# Patient Record
Sex: FEMALE | Race: WHITE | Employment: OTHER | URBAN - METROPOLITAN AREA
[De-identification: names, ages, dates, MRNs, and addresses within clinical notes are randomized per-mention and may not be internally consistent; named-entity substitution may affect disease eponyms.]

---

## 2018-12-13 ENCOUNTER — APPOINTMENT (OUTPATIENT)
Dept: CT IMAGING | Age: 83
End: 2018-12-13
Payer: MEDICARE

## 2018-12-13 ENCOUNTER — HOSPITAL ENCOUNTER (EMERGENCY)
Age: 83
Discharge: HOME | End: 2018-12-13
Attending: EMERGENCY MEDICINE
Payer: MEDICARE

## 2018-12-13 VITALS
DIASTOLIC BLOOD PRESSURE: 78 MMHG | BODY MASS INDEX: 22.16 KG/M2 | HEIGHT: 65 IN | HEART RATE: 77 BPM | SYSTOLIC BLOOD PRESSURE: 144 MMHG | WEIGHT: 133 LBS | TEMPERATURE: 98.1 F | OXYGEN SATURATION: 95 % | RESPIRATION RATE: 16 BRPM

## 2018-12-13 DIAGNOSIS — R10.30 LOWER ABDOMINAL PAIN: Primary | ICD-10-CM

## 2018-12-13 DIAGNOSIS — N20.0 KIDNEY STONE: ICD-10-CM

## 2018-12-13 LAB
ALBUMIN SERPL-MCNC: 4.5 G/DL (ref 3.5–5.2)
ALP BLD-CCNC: 78 U/L (ref 35–104)
ALT SERPL-CCNC: 7 U/L (ref 0–32)
ANION GAP SERPL CALCULATED.3IONS-SCNC: 13 MMOL/L (ref 7–16)
AST SERPL-CCNC: 14 U/L (ref 0–31)
BACTERIA: NORMAL /HPF
BASOPHILS ABSOLUTE: 0.03 E9/L (ref 0–0.2)
BASOPHILS RELATIVE PERCENT: 0.3 % (ref 0–2)
BILIRUB SERPL-MCNC: 1 MG/DL (ref 0–1.2)
BILIRUBIN URINE: NEGATIVE
BLOOD, URINE: ABNORMAL
BUN BLDV-MCNC: 14 MG/DL (ref 8–23)
CALCIUM SERPL-MCNC: 9.6 MG/DL (ref 8.6–10.2)
CHLORIDE BLD-SCNC: 99 MMOL/L (ref 98–107)
CLARITY: CLEAR
CO2: 27 MMOL/L (ref 22–29)
COLOR: YELLOW
CREAT SERPL-MCNC: 0.6 MG/DL (ref 0.5–1)
EOSINOPHILS ABSOLUTE: 0.04 E9/L (ref 0.05–0.5)
EOSINOPHILS RELATIVE PERCENT: 0.4 % (ref 0–6)
GFR AFRICAN AMERICAN: >60
GFR NON-AFRICAN AMERICAN: >60 ML/MIN/1.73
GLUCOSE BLD-MCNC: 153 MG/DL (ref 74–99)
GLUCOSE URINE: NEGATIVE MG/DL
HCT VFR BLD CALC: 35.4 % (ref 34–48)
HEMOGLOBIN: 12 G/DL (ref 11.5–15.5)
IMMATURE GRANULOCYTES #: 0.02 E9/L
IMMATURE GRANULOCYTES %: 0.2 % (ref 0–5)
KETONES, URINE: NEGATIVE MG/DL
LACTIC ACID: 1.4 MMOL/L (ref 0.5–2.2)
LEUKOCYTE ESTERASE, URINE: NEGATIVE
LIPASE: 21 U/L (ref 13–60)
LYMPHOCYTES ABSOLUTE: 1.05 E9/L (ref 1.5–4)
LYMPHOCYTES RELATIVE PERCENT: 10.3 % (ref 20–42)
MCH RBC QN AUTO: 31.7 PG (ref 26–35)
MCHC RBC AUTO-ENTMCNC: 33.9 % (ref 32–34.5)
MCV RBC AUTO: 93.7 FL (ref 80–99.9)
MONOCYTES ABSOLUTE: 0.49 E9/L (ref 0.1–0.95)
MONOCYTES RELATIVE PERCENT: 4.8 % (ref 2–12)
NEUTROPHILS ABSOLUTE: 8.55 E9/L (ref 1.8–7.3)
NEUTROPHILS RELATIVE PERCENT: 84 % (ref 43–80)
NITRITE, URINE: NEGATIVE
PDW BLD-RTO: 12.7 FL (ref 11.5–15)
PH UA: 6 (ref 5–9)
PLATELET # BLD: 219 E9/L (ref 130–450)
PMV BLD AUTO: 9 FL (ref 7–12)
POTASSIUM SERPL-SCNC: 4.2 MMOL/L (ref 3.5–5)
PROTEIN UA: NEGATIVE MG/DL
RBC # BLD: 3.78 E12/L (ref 3.5–5.5)
RBC UA: NORMAL /HPF (ref 0–2)
SODIUM BLD-SCNC: 139 MMOL/L (ref 132–146)
SPECIFIC GRAVITY UA: <=1.005 (ref 1–1.03)
TOTAL PROTEIN: 6.9 G/DL (ref 6.4–8.3)
TROPONIN: <0.01 NG/ML (ref 0–0.03)
UROBILINOGEN, URINE: 0.2 E.U./DL
WBC # BLD: 10.2 E9/L (ref 4.5–11.5)
WBC UA: NORMAL /HPF (ref 0–5)

## 2018-12-13 PROCEDURE — 99284 EMERGENCY DEPT VISIT MOD MDM: CPT

## 2018-12-13 PROCEDURE — 81001 URINALYSIS AUTO W/SCOPE: CPT

## 2018-12-13 PROCEDURE — 80053 COMPREHEN METABOLIC PANEL: CPT

## 2018-12-13 PROCEDURE — 2580000003 HC RX 258: Performed by: EMERGENCY MEDICINE

## 2018-12-13 PROCEDURE — 83605 ASSAY OF LACTIC ACID: CPT

## 2018-12-13 PROCEDURE — 85025 COMPLETE CBC W/AUTO DIFF WBC: CPT

## 2018-12-13 PROCEDURE — 83690 ASSAY OF LIPASE: CPT

## 2018-12-13 PROCEDURE — 74176 CT ABD & PELVIS W/O CONTRAST: CPT

## 2018-12-13 PROCEDURE — 84484 ASSAY OF TROPONIN QUANT: CPT

## 2018-12-13 RX ORDER — HYDROCODONE BITARTRATE AND ACETAMINOPHEN 5; 325 MG/1; MG/1
0.5 TABLET ORAL EVERY 6 HOURS PRN
Qty: 6 TABLET | Refills: 0 | Status: SHIPPED | OUTPATIENT
Start: 2018-12-13 | End: 2018-12-17

## 2018-12-13 RX ORDER — CEPHALEXIN 500 MG/1
500 CAPSULE ORAL 2 TIMES DAILY
Qty: 14 CAPSULE | Refills: 0 | Status: SHIPPED | OUTPATIENT
Start: 2018-12-13 | End: 2018-12-20

## 2018-12-13 RX ORDER — 0.9 % SODIUM CHLORIDE 0.9 %
500 INTRAVENOUS SOLUTION INTRAVENOUS ONCE
Status: COMPLETED | OUTPATIENT
Start: 2018-12-13 | End: 2018-12-13

## 2018-12-13 RX ORDER — ONDANSETRON 4 MG/1
4 TABLET, FILM COATED ORAL EVERY 8 HOURS PRN
Qty: 12 TABLET | Refills: 0 | Status: SHIPPED | OUTPATIENT
Start: 2018-12-13 | End: 2018-12-18

## 2018-12-13 RX ADMIN — SODIUM CHLORIDE 500 ML: 9 INJECTION, SOLUTION INTRAVENOUS at 18:15

## 2018-12-13 ASSESSMENT — PAIN DESCRIPTION - DESCRIPTORS: DESCRIPTORS: STABBING

## 2018-12-13 ASSESSMENT — PAIN DESCRIPTION - FREQUENCY: FREQUENCY: INTERMITTENT

## 2018-12-13 NOTE — ED NOTES
Pt son Dr Karl Thomas would like a phone call if his mother is admitted. Phone number [phone].      Yulissa Flores RN  12/13/18 6540

## 2022-07-16 ENCOUNTER — APPOINTMENT (OUTPATIENT)
Dept: CT IMAGING | Age: 87
DRG: 100 | End: 2022-07-16
Payer: MEDICARE

## 2022-07-16 ENCOUNTER — HOSPITAL ENCOUNTER (INPATIENT)
Age: 87
LOS: 7 days | Discharge: HOME HEALTH | DRG: 100 | End: 2022-07-23
Attending: EMERGENCY MEDICINE | Admitting: INTERNAL MEDICINE
Payer: MEDICARE

## 2022-07-16 DIAGNOSIS — R56.9 SEIZURE-LIKE ACTIVITY (HCC): Primary | ICD-10-CM

## 2022-07-16 LAB
ANION GAP SERPL CALCULATED.3IONS-SCNC: 12 MMOL/L (ref 7–16)
BACTERIA: ABNORMAL /HPF
BASOPHILS ABSOLUTE: 0.02 E9/L (ref 0–0.2)
BASOPHILS RELATIVE PERCENT: 0.3 % (ref 0–2)
BILIRUBIN URINE: NEGATIVE
BLOOD, URINE: ABNORMAL
BUN BLDV-MCNC: 11 MG/DL (ref 6–23)
CALCIUM SERPL-MCNC: 9.1 MG/DL (ref 8.6–10.2)
CHLORIDE BLD-SCNC: 96 MMOL/L (ref 98–107)
CLARITY: CLEAR
CO2: 27 MMOL/L (ref 22–29)
COLOR: YELLOW
CREAT SERPL-MCNC: 0.6 MG/DL (ref 0.5–1)
EOSINOPHILS ABSOLUTE: 0.1 E9/L (ref 0.05–0.5)
EOSINOPHILS RELATIVE PERCENT: 1.4 % (ref 0–6)
GFR AFRICAN AMERICAN: >60
GFR NON-AFRICAN AMERICAN: >60 ML/MIN/1.73
GLUCOSE BLD-MCNC: 369 MG/DL (ref 74–99)
GLUCOSE URINE: >=1000 MG/DL
HCT VFR BLD CALC: 43.2 % (ref 34–48)
HEMOGLOBIN: 14.5 G/DL (ref 11.5–15.5)
IMMATURE GRANULOCYTES #: 0.02 E9/L
IMMATURE GRANULOCYTES %: 0.3 % (ref 0–5)
KETONES, URINE: 15 MG/DL
LACTIC ACID: 1.9 MMOL/L (ref 0.5–2.2)
LEUKOCYTE ESTERASE, URINE: ABNORMAL
LYMPHOCYTES ABSOLUTE: 1.53 E9/L (ref 1.5–4)
LYMPHOCYTES RELATIVE PERCENT: 20.7 % (ref 20–42)
MAGNESIUM: 1.8 MG/DL (ref 1.6–2.6)
MCH RBC QN AUTO: 30.8 PG (ref 26–35)
MCHC RBC AUTO-ENTMCNC: 33.6 % (ref 32–34.5)
MCV RBC AUTO: 91.7 FL (ref 80–99.9)
METER GLUCOSE: 208 MG/DL (ref 74–99)
METER GLUCOSE: 240 MG/DL (ref 74–99)
MONOCYTES ABSOLUTE: 0.42 E9/L (ref 0.1–0.95)
MONOCYTES RELATIVE PERCENT: 5.7 % (ref 2–12)
NEUTROPHILS ABSOLUTE: 5.29 E9/L (ref 1.8–7.3)
NEUTROPHILS RELATIVE PERCENT: 71.6 % (ref 43–80)
NITRITE, URINE: NEGATIVE
PDW BLD-RTO: 12.8 FL (ref 11.5–15)
PH UA: 6 (ref 5–9)
PLATELET # BLD: 210 E9/L (ref 130–450)
PMV BLD AUTO: 9.5 FL (ref 7–12)
POTASSIUM REFLEX MAGNESIUM: 3.3 MMOL/L (ref 3.5–5)
PROTEIN UA: NEGATIVE MG/DL
RBC # BLD: 4.71 E12/L (ref 3.5–5.5)
RBC UA: ABNORMAL /HPF (ref 0–2)
REASON FOR REJECTION: NORMAL
REJECTED TEST: NORMAL
SARS-COV-2, NAAT: DETECTED
SODIUM BLD-SCNC: 135 MMOL/L (ref 132–146)
SPECIFIC GRAVITY UA: 1.01 (ref 1–1.03)
TOTAL CK: 22 U/L (ref 20–180)
TROPONIN, HIGH SENSITIVITY: 20 NG/L (ref 0–9)
UROBILINOGEN, URINE: 0.2 E.U./DL
WBC # BLD: 7.4 E9/L (ref 4.5–11.5)
WBC UA: ABNORMAL /HPF (ref 0–5)

## 2022-07-16 PROCEDURE — 93005 ELECTROCARDIOGRAM TRACING: CPT | Performed by: STUDENT IN AN ORGANIZED HEALTH CARE EDUCATION/TRAINING PROGRAM

## 2022-07-16 PROCEDURE — 70450 CT HEAD/BRAIN W/O DYE: CPT

## 2022-07-16 PROCEDURE — 6360000002 HC RX W HCPCS: Performed by: INTERNAL MEDICINE

## 2022-07-16 PROCEDURE — 83735 ASSAY OF MAGNESIUM: CPT

## 2022-07-16 PROCEDURE — 84484 ASSAY OF TROPONIN QUANT: CPT

## 2022-07-16 PROCEDURE — 83605 ASSAY OF LACTIC ACID: CPT

## 2022-07-16 PROCEDURE — 99285 EMERGENCY DEPT VISIT HI MDM: CPT

## 2022-07-16 PROCEDURE — 6360000002 HC RX W HCPCS: Performed by: STUDENT IN AN ORGANIZED HEALTH CARE EDUCATION/TRAINING PROGRAM

## 2022-07-16 PROCEDURE — 87635 SARS-COV-2 COVID-19 AMP PRB: CPT

## 2022-07-16 PROCEDURE — 82550 ASSAY OF CK (CPK): CPT

## 2022-07-16 PROCEDURE — 1200000000 HC SEMI PRIVATE

## 2022-07-16 PROCEDURE — 85025 COMPLETE CBC W/AUTO DIFF WBC: CPT

## 2022-07-16 PROCEDURE — 81001 URINALYSIS AUTO W/SCOPE: CPT

## 2022-07-16 PROCEDURE — 6370000000 HC RX 637 (ALT 250 FOR IP): Performed by: STUDENT IN AN ORGANIZED HEALTH CARE EDUCATION/TRAINING PROGRAM

## 2022-07-16 PROCEDURE — 80048 BASIC METABOLIC PNL TOTAL CA: CPT

## 2022-07-16 PROCEDURE — 82962 GLUCOSE BLOOD TEST: CPT

## 2022-07-16 RX ORDER — MIDAZOLAM HYDROCHLORIDE 2 MG/2ML
0.5 INJECTION, SOLUTION INTRAMUSCULAR; INTRAVENOUS ONCE
Status: COMPLETED | OUTPATIENT
Start: 2022-07-16 | End: 2022-07-16

## 2022-07-16 RX ORDER — INSULIN LISPRO 100 [IU]/ML
0-6 INJECTION, SOLUTION INTRAVENOUS; SUBCUTANEOUS NIGHTLY
Status: DISCONTINUED | OUTPATIENT
Start: 2022-07-16 | End: 2022-07-23 | Stop reason: HOSPADM

## 2022-07-16 RX ORDER — INSULIN LISPRO 100 [IU]/ML
0-12 INJECTION, SOLUTION INTRAVENOUS; SUBCUTANEOUS
Status: DISCONTINUED | OUTPATIENT
Start: 2022-07-16 | End: 2022-07-23 | Stop reason: HOSPADM

## 2022-07-16 RX ORDER — ONDANSETRON 2 MG/ML
4 INJECTION INTRAMUSCULAR; INTRAVENOUS EVERY 6 HOURS PRN
Status: DISCONTINUED | OUTPATIENT
Start: 2022-07-16 | End: 2022-07-23 | Stop reason: HOSPADM

## 2022-07-16 RX ORDER — LEVETIRACETAM 5 MG/ML
500 INJECTION INTRAVASCULAR EVERY 12 HOURS
Status: DISCONTINUED | OUTPATIENT
Start: 2022-07-16 | End: 2022-07-17

## 2022-07-16 RX ORDER — LORAZEPAM 2 MG/ML
1 INJECTION INTRAMUSCULAR ONCE
Status: DISCONTINUED | OUTPATIENT
Start: 2022-07-16 | End: 2022-07-16

## 2022-07-16 RX ORDER — ACETAMINOPHEN 500 MG
500 TABLET ORAL EVERY 4 HOURS PRN
Status: DISCONTINUED | OUTPATIENT
Start: 2022-07-16 | End: 2022-07-23 | Stop reason: HOSPADM

## 2022-07-16 RX ORDER — DEXTROSE MONOHYDRATE 50 MG/ML
100 INJECTION, SOLUTION INTRAVENOUS PRN
Status: DISCONTINUED | OUTPATIENT
Start: 2022-07-16 | End: 2022-07-23 | Stop reason: HOSPADM

## 2022-07-16 RX ORDER — ENOXAPARIN SODIUM 100 MG/ML
40 INJECTION SUBCUTANEOUS DAILY
Status: DISCONTINUED | OUTPATIENT
Start: 2022-07-16 | End: 2022-07-17

## 2022-07-16 RX ORDER — LOSARTAN POTASSIUM 50 MG/1
100 TABLET ORAL DAILY
Status: DISCONTINUED | OUTPATIENT
Start: 2022-07-16 | End: 2022-07-23 | Stop reason: HOSPADM

## 2022-07-16 RX ORDER — CALCIUM POLYCARBOPHIL 625 MG 625 MG/1
625 TABLET ORAL DAILY
Status: DISCONTINUED | OUTPATIENT
Start: 2022-07-17 | End: 2022-07-21

## 2022-07-16 RX ADMIN — POTASSIUM BICARBONATE 50 MEQ: 782 TABLET, EFFERVESCENT ORAL at 15:24

## 2022-07-16 RX ADMIN — MIDAZOLAM 0.5 MG: 1 INJECTION INTRAMUSCULAR; INTRAVENOUS at 20:14

## 2022-07-16 RX ADMIN — LEVETIRACETAM 500 MG: 5 INJECTION INTRAVENOUS at 20:38

## 2022-07-16 ASSESSMENT — PAIN - FUNCTIONAL ASSESSMENT: PAIN_FUNCTIONAL_ASSESSMENT: NONE - DENIES PAIN

## 2022-07-16 NOTE — ED PROVIDER NOTES
The history is provided by the patient and medical records. The history is limited by the condition of the patient. 80-year-old female presents emergency department complaint of concern of possible seizure. Reportedly on a new medication for her dementia. An hour after taking that she started feeling her arms. Family was concerned about possible seizure and had her brought in by EMS. Patient does have a history of dementia unknown what her normal mental status exam is however she is unable to find a history for me at this time. Otherwise she denies any acute complaints denies any chest pain or shortness of breath denies any fever chills, just redness. The patient presents with concern for seizure that has been going on for 1 day. These symptoms are moderate in severity. Symptoms are made better by ntoghn. Symptoms are made worse by nothing. Associated symptoms include none. Review of Systems   Unable to perform ROS: Dementia      Physical Exam  Vitals and nursing note reviewed. Constitutional:       General: She is not in acute distress. Appearance: Normal appearance. She is normal weight. She is not toxic-appearing. HENT:      Head: Normocephalic and atraumatic. Eyes:      Conjunctiva/sclera: Conjunctivae normal.   Cardiovascular:      Rate and Rhythm: Normal rate and regular rhythm. Pulses: Normal pulses. Heart sounds: Normal heart sounds. No murmur heard. No gallop. Pulmonary:      Effort: Pulmonary effort is normal. No respiratory distress. Breath sounds: Normal breath sounds. No wheezing or rales. Abdominal:      General: Abdomen is flat. Bowel sounds are normal. There is no distension. Palpations: Abdomen is soft. Tenderness: There is no abdominal tenderness. There is no guarding. Musculoskeletal:         General: No swelling or deformity. Skin:     General: Skin is warm and dry. Capillary Refill: Capillary refill takes less than 2 seconds. Coloration: Skin is not jaundiced. Neurological:      General: No focal deficit present. Mental Status: She is alert. Cranial Nerves: No cranial nerve deficit. Sensory: No sensory deficit. Motor: No weakness. Comments: Alert and oriented to name and date of birth only. She does have a history of dementia difficult to assess normal mental status. Psychiatric:         Mood and Affect: Mood normal.         Thought Content: Thought content normal.        Procedures     MDM       59-year-old female present to the emergency department with the complaint of seizure at home. Patient had no active seizure while in the emergency department. Based on history of report of the patient's movements concern is for possible seizure. She has no history of seizures. Currently feeling the going on for the past 2 to 3 months. They do not notice anything that brings the seizures on. Patient's lactic is not elevated. Remaining laboratory work-up is reassuring. CAT scan head unremarkable. She will be admitted to the hospital for further work-up and management and possibly seen by neurology. Family agreeable with plan. ED Course as of 07/16/22 1947   Sat Jul 16, 2022   1340 EKG: This EKG is signed by emergency department physician. Rate: 73  Rhythm: Sinus  Interpretation: No acute ST elevation impression sinus rhythm normal axis stable intervals  Comparison: no previous EKG available      [CB]      ED Course User Index  [CB] Ricardo Mercado MD       ED Course as of 07/16/22 1947   Sat Jul 16, 2022   1340 EKG: This EKG is signed by emergency department physician.     Rate: 73  Rhythm: Sinus  Interpretation: No acute ST elevation impression sinus rhythm normal axis stable intervals  Comparison: no previous EKG available      [CB]      ED Course User Index  [CB] Ricardo Mercado MD       --------------------------------------------- PAST HISTORY ---------------------------------------------  Past Medical History:  has a past medical history of Diabetes mellitus ([facility] [address] DVT (deep venous thrombosis) ([facility] [address] Fall, and Hypertension. Past Surgical History:  has a past surgical history that includes  section; knee surgery; hip surgery; and Colonoscopy. Social History:  reports that she has never smoked. She has never used smokeless tobacco. She reports current alcohol use of about 1.0 standard drink per week. She reports that she does not use drugs. Family History: family history is not on file. The patients home medications have been reviewed.     Allergies: Sulfa antibiotics    -------------------------------------------------- RESULTS -------------------------------------------------    LABS:  Results for orders placed or performed during the hospital encounter of 22   COVID-19, Rapid    Specimen: Nasopharyngeal Swab   Result Value Ref Range    SARS-CoV-2, NAAT DETECTED (A) Not Detected   CBC with Auto Differential   Result Value Ref Range    WBC 7.4 4.5 - 11.5 E9/L    RBC 4.71 3.50 - 5.50 E12/L    Hemoglobin 14.5 11.5 - 15.5 g/dL    Hematocrit 43.2 34.0 - 48.0 %    MCV 91.7 80.0 - 99.9 fL    MCH 30.8 26.0 - 35.0 pg    MCHC 33.6 32.0 - 34.5 %    RDW 12.8 11.5 - 15.0 fL    Platelets 951 396 - 092 E9/L    MPV 9.5 7.0 - 12.0 fL    Neutrophils % 71.6 43.0 - 80.0 %    Immature Granulocytes % 0.3 0.0 - 5.0 %    Lymphocytes % 20.7 20.0 - 42.0 %    Monocytes % 5.7 2.0 - 12.0 %    Eosinophils % 1.4 0.0 - 6.0 %    Basophils % 0.3 0.0 - 2.0 %    Neutrophils Absolute 5.29 1.80 - 7.30 E9/L    Immature Granulocytes # 0.02 E9/L    Lymphocytes Absolute 1.53 1.50 - 4.00 E9/L    Monocytes Absolute 0.42 0.10 - 0.95 E9/L    Eosinophils Absolute 0.10 0.05 - 0.50 E9/L    Basophils Absolute 0.02 0.00 - 0.20 W3/R   Basic Metabolic Panel w/ Reflex to MG   Result Value Ref Range    Sodium 135 132 - 146 mmol/L    Potassium reflex Magnesium 3.3 (L) 3.5 - 5.0 (!) 140/82 -- 64 16 96 % --   07/16/22 1229 (!) 155/84 98 °F (36.7 °C) 82 18 -- 133 lb (60.3 kg)       Oxygen Saturation Interpretation: Normal    ------------------------------------------ PROGRESS NOTES ------------------------------------------  Re-evaluation(s):  Time: 0492  Patients symptoms show no change  Repeat physical examination is not changed    Counseling:  I have spoken with the patient and discussed todays results, in addition to providing specific details for the plan of care and counseling regarding the diagnosis and prognosis. Their questions are answered at this time and they are agreeable with the plan of admission.    --------------------------------- ADDITIONAL PROVIDER NOTES ---------------------------------  Consultations:  . Spoke with Dr. Nunu Omer. Discussed case. They will admit the patient. This patient's ED course included: a personal history and physicial examination, re-evaluation prior to disposition, multiple bedside re-evaluations, cardiac monitoring, and continuous pulse oximetry    This patient has remained hemodynamically stable during their ED course. Diagnosis:  1. Seizure-like activity ([facility] [address]        Disposition:  Patient's disposition: Admit to telemetry  Patient's condition is stable.          Darrel Lynn MD  Resident  07/16/22 0477

## 2022-07-16 NOTE — LETTER
[address] [place] [address] Dr Department Medicaid  CERTIFICATION OF NECESSITY  FOR NON-EMERGENCY TRANSPORTATION   BY GROUND AMBULANCE      Individual Information   1. Name: Shelly Palacio 2. [address] [place] [address] Dr Medicaid Billing Number:    3. Address: [place] [address]      Transportation Provider Information   4. Provider Name: Noah Guevara   5. [address] [place] [address] Dr Medicaid Provider Number:  National Provider Identifier (NPI):      Certification  7. Criteria:  During transport, this individual requires:  [x] Medical treatment or continuous     supervision by an EMT. [] The administration or regulation of oxygen by another person. [] Supervised protective restraint. 8. Period Beginning Date:     2022     9. Length  [x] Not more than 10 day(s)  [] One Year     Additional Information Relevant to Certification   10. Comments or Explanations, If Necessary or Appropriate     advanced dementia; Seizure-like activity; myoclonic jerks     Certifying Practitioner Information   11. Name of Practitioner: Bella Valle MD   12. [address] [place] [address] Dr Medicaid Provider Number, If Applicable:  [address] Provider Identifier (NPI):      Signature Information   14. Date of Signature: 2022   15. Name of Person Signing:   Chanel Marcum RN     16. Signature and Professional Designation: Electronically signed by Chanel Marcum RN on 2022 at 2:58 PM       OD 85[address]  Rev. 2015    [address] Encounter Date/Time: 2022 [address] [facility] Account: [de-identified]    MRN: [id number]    Patient: Shelly Palacio    Contact Serial #: [id number]      ENCOUNTER          Patient Class: I Private Enc?   No Unit  BD: SEYZ [id number]   Hospital Service: MED   Encounter DX: Seizure-like activity (H*   ADM Provider: Bella Valle MD   Procedure:     ATT Provider: Bella Valle MD   REF Provider:        Admission DX: Seizure-like activity [facility]) and DX codes: R56.9      PATIENT                 Name: Shelly Palacio : 5/10/1931 (80 yrs)   Address: [facility] [address] Sex: Female   City: [place] [address]         Marital Status:    Employer: RETIRED         Congregational: [employer]   Primary Care Provider: Sandy Ellison DO         Primary Phone: [phone]   EMERGENCY CONTACT   Contact Name Legal Guardian? Relationship to Patient Home Phone Work Phone   1. Kunal Lilly  2. Lizz Oh      Spouse  Child [phone]                 GUARANTOR            Guarantor: Brendanainsley Bingham     : 5/10/1931   Address: [place] [address] Sex: Female   [facility] [address]     Relation to Patient: Self       Home Phone: [phone]   Guarantor ID: [id number]       Work Phone:     Guarantor Employer: RETIRED         Status: RETIRED      COVERAGE        PRIMARY INSURANCE   Payor: [employer] MEDICARE Plan: [place] MEDICA*   Payor Address: [facility] [id number]       Group Number: [id number] Insurance Type: [address] Name: Sasha Harris : 05/10/1931   Subscriber ID: [id number] Pat. Rel. to Sub: Self   SECONDARY INSURANCE   Payor:   Plan:     Payor Address: ,          Group Number:   Insurance Type:     Subscriber Name:   Subscriber :     Subscriber ID:   Pat.  Rel. to Sub:             CSN: [id number]

## 2022-07-17 LAB
ALBUMIN SERPL-MCNC: 3.5 G/DL (ref 3.5–5.2)
ALP BLD-CCNC: 74 U/L (ref 35–104)
ALT SERPL-CCNC: 10 U/L (ref 0–32)
ANION GAP SERPL CALCULATED.3IONS-SCNC: 14 MMOL/L (ref 7–16)
AST SERPL-CCNC: 18 U/L (ref 0–31)
BASOPHILS ABSOLUTE: 0.03 E9/L (ref 0–0.2)
BASOPHILS RELATIVE PERCENT: 0.5 % (ref 0–2)
BILIRUB SERPL-MCNC: 1.5 MG/DL (ref 0–1.2)
BUN BLDV-MCNC: 6 MG/DL (ref 6–23)
CALCIUM SERPL-MCNC: 8 MG/DL (ref 8.6–10.2)
CHLORIDE BLD-SCNC: 107 MMOL/L (ref 98–107)
CO2: 22 MMOL/L (ref 22–29)
CREAT SERPL-MCNC: 0.4 MG/DL (ref 0.5–1)
EOSINOPHILS ABSOLUTE: 0.19 E9/L (ref 0.05–0.5)
EOSINOPHILS RELATIVE PERCENT: 3.4 % (ref 0–6)
GFR AFRICAN AMERICAN: >60
GFR NON-AFRICAN AMERICAN: >60 ML/MIN/1.73
GLUCOSE BLD-MCNC: 220 MG/DL (ref 74–99)
HCT VFR BLD CALC: 43.5 % (ref 34–48)
HEMOGLOBIN: 14.7 G/DL (ref 11.5–15.5)
IMMATURE GRANULOCYTES #: 0.01 E9/L
IMMATURE GRANULOCYTES %: 0.2 % (ref 0–5)
LYMPHOCYTES ABSOLUTE: 1.64 E9/L (ref 1.5–4)
LYMPHOCYTES RELATIVE PERCENT: 28.9 % (ref 20–42)
MCH RBC QN AUTO: 30.9 PG (ref 26–35)
MCHC RBC AUTO-ENTMCNC: 33.8 % (ref 32–34.5)
MCV RBC AUTO: 91.6 FL (ref 80–99.9)
METER GLUCOSE: 167 MG/DL (ref 74–99)
METER GLUCOSE: 184 MG/DL (ref 74–99)
METER GLUCOSE: 248 MG/DL (ref 74–99)
METER GLUCOSE: 261 MG/DL (ref 74–99)
MONOCYTES ABSOLUTE: 0.44 E9/L (ref 0.1–0.95)
MONOCYTES RELATIVE PERCENT: 7.8 % (ref 2–12)
NEUTROPHILS ABSOLUTE: 3.36 E9/L (ref 1.8–7.3)
NEUTROPHILS RELATIVE PERCENT: 59.2 % (ref 43–80)
PDW BLD-RTO: 12.7 FL (ref 11.5–15)
PLATELET # BLD: 192 E9/L (ref 130–450)
PMV BLD AUTO: 9.4 FL (ref 7–12)
POTASSIUM SERPL-SCNC: 3.7 MMOL/L (ref 3.5–5)
RBC # BLD: 4.75 E12/L (ref 3.5–5.5)
SODIUM BLD-SCNC: 143 MMOL/L (ref 132–146)
TOTAL CK: 30 U/L (ref 20–180)
TOTAL PROTEIN: 6 G/DL (ref 6.4–8.3)
WBC # BLD: 5.7 E9/L (ref 4.5–11.5)

## 2022-07-17 PROCEDURE — 82962 GLUCOSE BLOOD TEST: CPT

## 2022-07-17 PROCEDURE — 99222 1ST HOSP IP/OBS MODERATE 55: CPT | Performed by: PSYCHIATRY & NEUROLOGY

## 2022-07-17 PROCEDURE — 80053 COMPREHEN METABOLIC PANEL: CPT

## 2022-07-17 PROCEDURE — 93005 ELECTROCARDIOGRAM TRACING: CPT | Performed by: INTERNAL MEDICINE

## 2022-07-17 PROCEDURE — 2580000003 HC RX 258: Performed by: INTERNAL MEDICINE

## 2022-07-17 PROCEDURE — 36415 COLL VENOUS BLD VENIPUNCTURE: CPT

## 2022-07-17 PROCEDURE — 6360000002 HC RX W HCPCS: Performed by: INTERNAL MEDICINE

## 2022-07-17 PROCEDURE — 6370000000 HC RX 637 (ALT 250 FOR IP): Performed by: INTERNAL MEDICINE

## 2022-07-17 PROCEDURE — 2700000000 HC OXYGEN THERAPY PER DAY

## 2022-07-17 PROCEDURE — 1200000000 HC SEMI PRIVATE

## 2022-07-17 PROCEDURE — 85025 COMPLETE CBC W/AUTO DIFF WBC: CPT

## 2022-07-17 PROCEDURE — 2500000003 HC RX 250 WO HCPCS: Performed by: INTERNAL MEDICINE

## 2022-07-17 PROCEDURE — 82550 ASSAY OF CK (CPK): CPT

## 2022-07-17 PROCEDURE — 6360000002 HC RX W HCPCS: Performed by: PSYCHIATRY & NEUROLOGY

## 2022-07-17 RX ORDER — METOPROLOL TARTRATE 5 MG/5ML
5 INJECTION INTRAVENOUS EVERY 6 HOURS
Status: DISCONTINUED | OUTPATIENT
Start: 2022-07-17 | End: 2022-07-18

## 2022-07-17 RX ORDER — NYSTATIN 100000 U/G
CREAM TOPICAL 2 TIMES DAILY
Status: DISCONTINUED | OUTPATIENT
Start: 2022-07-17 | End: 2022-07-21

## 2022-07-17 RX ORDER — METHYLPHENIDATE HYDROCHLORIDE 5 MG/1
5 TABLET ORAL DAILY
Status: ON HOLD | COMMUNITY
End: 2022-07-23 | Stop reason: HOSPADM

## 2022-07-17 RX ORDER — NITROFURANTOIN 25; 75 MG/1; MG/1
100 CAPSULE ORAL 2 TIMES DAILY
Status: ON HOLD | COMMUNITY
End: 2022-07-23 | Stop reason: HOSPADM

## 2022-07-17 RX ORDER — AMIODARONE HYDROCHLORIDE 200 MG/1
200 TABLET ORAL DAILY
Status: DISCONTINUED | OUTPATIENT
Start: 2022-07-17 | End: 2022-07-22

## 2022-07-17 RX ORDER — SODIUM CHLORIDE 9 MG/ML
INJECTION, SOLUTION INTRAVENOUS CONTINUOUS
Status: DISCONTINUED | OUTPATIENT
Start: 2022-07-17 | End: 2022-07-23 | Stop reason: HOSPADM

## 2022-07-17 RX ORDER — LEVETIRACETAM 15 MG/ML
750 INJECTION INTRAVASCULAR EVERY 12 HOURS
Status: DISCONTINUED | OUTPATIENT
Start: 2022-07-17 | End: 2022-07-19

## 2022-07-17 RX ADMIN — METOPROLOL TARTRATE 5 MG: 1 INJECTION, SOLUTION INTRAVENOUS at 17:06

## 2022-07-17 RX ADMIN — INSULIN LISPRO 2 UNITS: 100 INJECTION, SOLUTION INTRAVENOUS; SUBCUTANEOUS at 22:44

## 2022-07-17 RX ADMIN — AMIODARONE HYDROCHLORIDE 200 MG: 200 TABLET ORAL at 20:23

## 2022-07-17 RX ADMIN — LOSARTAN POTASSIUM 100 MG: 25 TABLET, FILM COATED ORAL at 08:49

## 2022-07-17 RX ADMIN — INSULIN LISPRO 6 UNITS: 100 INJECTION, SOLUTION INTRAVENOUS; SUBCUTANEOUS at 09:13

## 2022-07-17 RX ADMIN — ENOXAPARIN SODIUM 40 MG: 100 INJECTION SUBCUTANEOUS at 08:49

## 2022-07-17 RX ADMIN — INSULIN LISPRO 2 UNITS: 100 INJECTION, SOLUTION INTRAVENOUS; SUBCUTANEOUS at 13:00

## 2022-07-17 RX ADMIN — AMIODARONE HYDROCHLORIDE 150 MG: 50 INJECTION, SOLUTION INTRAVENOUS at 20:20

## 2022-07-17 RX ADMIN — LEVETIRACETAM 750 MG: 15 INJECTION INTRAVENOUS at 19:05

## 2022-07-17 RX ADMIN — NYSTATIN: 100000 CREAM TOPICAL at 22:36

## 2022-07-17 RX ADMIN — SODIUM CHLORIDE: 9 INJECTION, SOLUTION INTRAVENOUS at 14:23

## 2022-07-17 RX ADMIN — INSULIN LISPRO 2 UNITS: 100 INJECTION, SOLUTION INTRAVENOUS; SUBCUTANEOUS at 18:15

## 2022-07-17 RX ADMIN — METFORMIN HYDROCHLORIDE 500 MG: 500 TABLET ORAL at 08:49

## 2022-07-17 ASSESSMENT — LIFESTYLE VARIABLES
HOW OFTEN DO YOU HAVE A DRINK CONTAINING ALCOHOL: NEVER
HOW MANY STANDARD DRINKS CONTAINING ALCOHOL DO YOU HAVE ON A TYPICAL DAY: PATIENT DOES NOT DRINK

## 2022-07-17 NOTE — ED NOTES
Report called to [place], RN 8WE. Pt placed in transport eduardo.      Tyler De Jesus RN  07/17/22 8993

## 2022-07-17 NOTE — PROGRESS NOTES
Notified Dr Sangita Escobar of patient's BP of 87/57 p-91, and spo2 of 97%RA and that family came to the nurses station stating that patient had another \"seizure episode,\" however before nursing got to the room, she was no longer showing any signs.

## 2022-07-17 NOTE — ED NOTES
Pt's brief changed for urinary incont. Pt repositioned in bed, purewick applied. Will cont to monitor.      Emanuel Beckwith RN  07/17/22 9742

## 2022-07-17 NOTE — CONSULTS
CARDIOLOGY CONSULTATION    Patient Name:  Ptier Ricardo    :  5/10/1931    Reason for Consultation:   New onset atrial fibrillation with rapid ventricular response    History of Present Illness:   Piter Ricardo presents to [facility],following a  history of sudden onset of absence - like spell having just ingested her new medications for dementia. Apparently her head went back and eyes rolled back in the back of her head. She does not recall any of this and her history is offered by her family members specifically her daughter. who is sitting at her bedside presently. According to the daughter she has no previous history of seizure-like activity but has rapidly deteriorated from a mentation standpoint. Mrs. Maxim Su denies any shortness of breath no chest discomfort or palpitations once again her answers may not be accurate according to her daughter. Following her admission she was placed on a monitor and initially was in sinus rhythm but subsequently converted to atrial fibrillation with a rapid ventricular response but did not demonstrate any similar symptoms of which brought her to the hospital in the first place. She is now admitted for further observation and diagnostic testing to address her symptoms for which she was admitted as well as the initiation of atrial fibrillation for which he has no previous documented history from a symptomatic standpoint or objective. Electrocardiographic findings. .    Past Medical History:   has a past medical history of Diabetes mellitus ([address] DVT (deep venous thrombosis) ([facility] [address] Fall, and Hypertension. Surgical History:   has a past surgical history that includes  section; knee surgery; hip surgery; and Colonoscopy. Social History:   reports that she has never smoked. She has never used smokeless tobacco. She reports current alcohol use of about 1.0 standard drink per week. She reports that she does not use drugs.      Family History:  family history is remarkable for mother deseased CVA and father unknown etiology. Medications:  Prior to Admission medications    Medication Sig Start Date End Date Taking? Authorizing Provider   nitrofurantoin, macrocrystal-monohydrate, (MACROBID) 100 MG capsule Take 100 mg by mouth in the morning and 100 mg before bedtime. Yes Historical Provider, MD   methylphenidate (RITALIN) 5 MG tablet Take 5 mg by mouth in the morning. Yes Historical Provider, MD   polycarbophil (FIBERCON) 625 MG tablet Take 1 tablet by mouth daily  Patient not taking: Reported on 7/17/2022 11/16/17   Kathi Hoyt MD   metFORMIN (GLUCOPHAGE) 500 MG tablet Take 500 mg by mouth daily (with breakfast)  Patient not taking: Reported on 7/17/2022    Historical Provider, MD   olmesartan (BENICAR) 20 MG tablet Take 40 mg by mouth daily. Patient not taking: Reported on 7/17/2022    Historical Provider, MD       Allergies:  Sulfa antibiotics     Review of Systems:   Constitutional: there has been no unanticipated weight loss. There's been no significant change in energy or activity level, nor sleep pattern . No fever chills or rigors. Eyes: No visual changes or diplopia. No scleral icterus. ENT: No Headaches, hearing loss or vertigo. No mouth sores or sore throat. No change in taste or smell. Cardiovascular: No chest discomfort, dyspnea on exertion, palpitations, loss of consciousness, no phlebitis, no claudication. Respiratory: No cough or wheezing, no sputum production. No hemoptysis, pleuritic pain. Gastrointestinal: No abdominal pain, appetite loss, blood in stools. No change in bowel habits. No hematemesis  Genitourinary: No dysuria, trouble voiding or hematuria. No nocturia or increased frequency. Musculoskeletal:  No gait disturbance, weakness or joint complaints. Integumentary: No rash or pruritis. Neurological: No headache, diplopia, change in muscle strength, numbness or tingling.  No change in gait, balance, coordination, mood, affect, memory, mentation, behavior. Transient change in neurologic cognitive status. Psychiatric: No anxiety or depression.+ Dementia  Endocrine: No temperature intolerance. No excessive thirst, fluid intake, or urination. No tremor. Hematologic/Lymphatic: No abnormal bruising or bleeding, blood clots or swollen lymph nodes. Allergic/Immunologic: No nasal congestion or hives. Physical Examination:    Vital Signs: BP (!) 171/75   Pulse 68   Temp 97.9 °F (36.6 °C) (Temporal)   Resp 18   Ht 5' 5\" (1.651 m)   Wt 133 lb (60.3 kg)   SpO2 93%   BMI 22.13 kg/m²   General appearance: Well preserved, mesomorphic body habitus, alert, no distress. Skin: Skin color, texture, turgor normal. No rashes or lesions. No induration or tightening palpated. Head: Normocephalic. No masses, lesions, tenderness or abnormalities  Eyes: Conjunctivae/corneas clear. PERRL, EOMs intact. Sclera non icteric. Ears: External ears normal. Canals clear. TM's clear bilaterally. Hearing normal to finger rub. Nose/Sinuses: Nares normal. Septum midline. Mucosa normal. No drainage or sinus tenderness. Oropharynx: Lips, mucosa, and tongue normal. Oropharynx clear with no exudate seen. Neck: Neck supple and symmetric. No adenopathy. Thyroid symmetric, normal size, without nodules. Trachea is midline. Carotids brisk in upstroke without bruits, no abnormal JVP noted at 45°. Chest: Even excursion  Lungs: Lungs clear to auscultation bilaterally. No retractions or use of accessory muscles. No tactile vocal fremitus. No rhonchi, crackles or rales. Heart:  S1 > S2. Irregular, irregular rhythm. No gallop or murmur. No rub, palpable thrill or heave noted. PMI 5th intercostal space midclavicular line. Abdomen: Abdomen soft, mildly protuberant, non-tender. BS normal. No masses, organomegaly. No hernia noted. Extremities: Extremities normal. No deformities, edema, or skin discoloration.   No cyanosis or clubbing noted to the nails. Peripheral pulses present 2+ upper extremities and present 1+  lower extremities. Musculoskeletal: Spine ROM normal. Muscular strength intact. Neuro: Cranial nerves intact. Motor: Strength 5/5 in all extremities. Reflexes 2+ in all extremities. No focal weakness. Sensory: grossly normal to touch. Coordination intact. Pertinent Labs:  CBC:   Recent Labs     07/16/22  1318 07/17/22  0536   WBC 7.4 5.7   HGB 14.5 14.7    192     BMP:  Recent Labs     07/16/22  1318 07/17/22  0536    143   K 3.3* 3.7   CL 96* 107   CO2 27 22   BUN 11 6   CREATININE 0.6 0.4*   GLUCOSE 369* 220*   LABGLOM >60 >60     ABGs:   Lab Results   Component Value Date/Time    PH 7.39 12/05/2011 06:10 PM     INR: No results for input(s): INR in the last 72 hours. PRO-BNP:   Lab Results   Component Value Date    PROBNP 118 04/11/2017    PROBNP 184 04/22/2015      Cardiac Injury Profile:   Recent Labs     07/16/22  1318   TROPHS 20*      Lipid Profile: No results found for: TRIG, HDL, LDLCALC, CHOL   Thyroid:   Lab Results   Component Value Date    TSH 7.990 (H) 04/23/2015      Hemoglobin A1C: No components found for: HGBA1C   ECG:  See report    Radiology:  CT Head WO Contrast   Final Result   1. No acute intracranial hemorrhage or edema. 2.  Ethmoid and sphenoid sinusitis. MRI BRAIN W WO CONTRAST    (Results Pending)     Assessment:    Patient Active Problem List   Diagnosis Code    Seizure-like activity ([facility] [address] R56.9       Plan:  Based upon the patient's age and presenting symptoms is entirely possible that she could be sustaining micro emboli secondary to recurrent atrial fibrillation which he manifest at this time. Nonetheless, she will need to be on permanent anticoagulation and would eventually like to utilize amiodarone and low-dose following her conversion to sinus rhythm and prophylaxis against recurrent atrial fibrillation.   Likewise a two-dimensional echocardiogram will be obtained in her cardiac medical regimen Will be initiated and adjusted as clinically warranted. Upon further questioning with her daughter it appears that multiple members of their family have atrial fibrillation and this simply may be an inherited process or it is possible that this might be a complication of [id number] virus which she apparently had a few weeks ago. we will follow as clinically warranted. I have spent more than 55 minutes face to face with Armond Min reviewing notes and laboratory data with greater than 50% of this time instructing and counseling the patient and her daughter and son per telephone regarding my findings and recommendations and I have answered all questions as posed to me by Ms. Marcelino Sims. Thank you, Kecia Zaldivar MD and Jessica Schwab DO for allowing me to consult in the care of this patient. Debra Pride DO , FACP, [facility], [facility]    NOTE:  This report was transcribed using voice recognition software. Every effort was made to ensure accuracy; however, inadvertent computerized transcription errors may be present.

## 2022-07-17 NOTE — H&P
[facility]  [facility]   History and Physical      CHIEF COMPLAINT: Abnormal body movements      HISTORY OF PRESENT ILLNESS:    Patient was seen on the floor earlier this morning at the main campus of [facility]  Son is at bedside  Data reviewed in detail  Spoke with the ER physician at the time of admission  I called her other son who is a neurosurgeon and got a better history  80-year-old  woman with advanced dementia  Lives at home with   Apparently was noted to have these abnormal extremity movements which the son described as myoclonic jerks but not seizures  She never had any oral injury or postictal status  Her CK is completely normal  Apparently these episodes are happening for the last 2 weeks since she took 1 dose of methylphenidate For hypersomnolence  Later in the day today she developed atrial fibrillation with rapid ventricular response  Cardiology is being consulted  Currently on prophylactic Keppra  MRI EEG pending    Past Medical History:    Past Medical History:   Diagnosis Date    Diabetes mellitus ([facility] [address]     DVT (deep venous thrombosis) ([facility] [address] 2011    left leg    Fall     recent, falls x3    Hypertension        Past Surgical History:    Past Surgical History:   Procedure Laterality Date     SECTION      x4    COLONOSCOPY      HIP SURGERY      right    KNEE SURGERY      left       Medications Prior to Admission:    Medications Prior to Admission: nitrofurantoin, macrocrystal-monohydrate, (MACROBID) 100 MG capsule, Take 100 mg by mouth in the morning and 100 mg before bedtime. methylphenidate (RITALIN) 5 MG tablet, Take 5 mg by mouth in the morning.   polycarbophil (FIBERCON) 625 MG tablet, Take 1 tablet by mouth daily (Patient not taking: Reported on 2022)  metFORMIN (GLUCOPHAGE) 500 MG tablet, Take 500 mg by mouth daily (with breakfast) (Patient not taking: Reported on 2022)  olmesartan (BENICAR) 20 MG tablet, Take 40 mg by mouth daily.   (Patient not taking: Reported on 7/17/2022)    Allergies:    Sulfa antibiotics    Social History:    reports that she has never smoked. She has never used smokeless tobacco. She reports current alcohol use of about 1.0 standard drink per week. She reports that she does not use drugs. Family History:   family history is not on file. REVIEW OF SYSTEMS:  As above in the HPI, otherwise negative    PHYSICAL EXAM:    Vitals:  BP (!) 171/75   Pulse 68   Temp 97.9 °F (36.6 °C) (Temporal)   Resp 18   Ht 5' 5\" (1.651 m)   Wt 133 lb (60.3 kg)   SpO2 93%   BMI 22.13 kg/m²     General:  Awake, alert, she does not recall any events leading to hospitalization  Follows commands  HEENT:  Normocephalic, atraumatic. Pupils equal, round, reactive to light. No scleral icterus. No conjunctival injection. No nasal discharge. Neck:  Supple  Heart:  RRR, no murmurs, gallops, rubs  Lungs:  CTA bilaterally, bilat symmetrical expansion, no wheeze, rales, or rhonchi  Abdomen:   Bowel sounds present, soft, nontender, no masses, no organomegaly, no peritoneal signs  Extremities:  No clubbing, cyanosis, or edema  Skin:  Warm and dry, no open lesions or rash  Neuro:  Cranial nerves 2-12 intact, no focal deficits  Breast: deferred  Rectal: deferred  Genitalia:  deferred    LABS:  Data reviewed      ASSESSMENT:      Principal Problem:    Seizure-like activity ([address]  Likely myoclonic jerks  New onset of atrial fibrillation with rapid ventricular response  Advanced dementia      PLAN:  Spoke with her son who is a neurosurgeon  Discussed the diagnostic plans at Angelika Ramírez MD  6:06 PM  7/17/2022

## 2022-07-18 ENCOUNTER — APPOINTMENT (OUTPATIENT)
Dept: MRI IMAGING | Age: 87
DRG: 100 | End: 2022-07-18
Payer: MEDICARE

## 2022-07-18 LAB
ALBUMIN SERPL-MCNC: 3.2 G/DL (ref 3.5–5.2)
ALP BLD-CCNC: 70 U/L (ref 35–104)
ALT SERPL-CCNC: 9 U/L (ref 0–32)
ANION GAP SERPL CALCULATED.3IONS-SCNC: 13 MMOL/L (ref 7–16)
AST SERPL-CCNC: 13 U/L (ref 0–31)
BASOPHILS ABSOLUTE: 0.03 E9/L (ref 0–0.2)
BASOPHILS RELATIVE PERCENT: 0.4 % (ref 0–2)
BILIRUB SERPL-MCNC: 1.2 MG/DL (ref 0–1.2)
BUN BLDV-MCNC: 13 MG/DL (ref 6–23)
CALCIUM SERPL-MCNC: 8.7 MG/DL (ref 8.6–10.2)
CHLORIDE BLD-SCNC: 108 MMOL/L (ref 98–107)
CO2: 23 MMOL/L (ref 22–29)
CREAT SERPL-MCNC: 0.7 MG/DL (ref 0.5–1)
EKG ATRIAL RATE: 113 BPM
EKG ATRIAL RATE: 73 BPM
EKG P AXIS: 42 DEGREES
EKG P-R INTERVAL: 182 MS
EKG Q-T INTERVAL: 330 MS
EKG Q-T INTERVAL: 426 MS
EKG QRS DURATION: 72 MS
EKG QRS DURATION: 80 MS
EKG QTC CALCULATION (BAZETT): 446 MS
EKG QTC CALCULATION (BAZETT): 469 MS
EKG R AXIS: 36 DEGREES
EKG R AXIS: 38 DEGREES
EKG T AXIS: 78 DEGREES
EKG T AXIS: 79 DEGREES
EKG VENTRICULAR RATE: 110 BPM
EKG VENTRICULAR RATE: 73 BPM
EOSINOPHILS ABSOLUTE: 0.14 E9/L (ref 0.05–0.5)
EOSINOPHILS RELATIVE PERCENT: 2 % (ref 0–6)
GFR AFRICAN AMERICAN: >60
GFR NON-AFRICAN AMERICAN: >60 ML/MIN/1.73
GLUCOSE BLD-MCNC: 165 MG/DL (ref 74–99)
HCT VFR BLD CALC: 40.6 % (ref 34–48)
HEMOGLOBIN: 13.7 G/DL (ref 11.5–15.5)
IMMATURE GRANULOCYTES #: 0.02 E9/L
IMMATURE GRANULOCYTES %: 0.3 % (ref 0–5)
LV EF: 70 %
LVEF MODALITY: NORMAL
LYMPHOCYTES ABSOLUTE: 2.13 E9/L (ref 1.5–4)
LYMPHOCYTES RELATIVE PERCENT: 31 % (ref 20–42)
MCH RBC QN AUTO: 30.9 PG (ref 26–35)
MCHC RBC AUTO-ENTMCNC: 33.7 % (ref 32–34.5)
MCV RBC AUTO: 91.4 FL (ref 80–99.9)
METER GLUCOSE: 142 MG/DL (ref 74–99)
METER GLUCOSE: 178 MG/DL (ref 74–99)
METER GLUCOSE: 205 MG/DL (ref 74–99)
MONOCYTES ABSOLUTE: 0.49 E9/L (ref 0.1–0.95)
MONOCYTES RELATIVE PERCENT: 7.1 % (ref 2–12)
NEUTROPHILS ABSOLUTE: 4.07 E9/L (ref 1.8–7.3)
NEUTROPHILS RELATIVE PERCENT: 59.2 % (ref 43–80)
PDW BLD-RTO: 13.2 FL (ref 11.5–15)
PLATELET # BLD: 206 E9/L (ref 130–450)
PMV BLD AUTO: 9.6 FL (ref 7–12)
POTASSIUM SERPL-SCNC: 3.7 MMOL/L (ref 3.5–5)
RBC # BLD: 4.44 E12/L (ref 3.5–5.5)
SODIUM BLD-SCNC: 144 MMOL/L (ref 132–146)
TOTAL PROTEIN: 5.9 G/DL (ref 6.4–8.3)
WBC # BLD: 6.9 E9/L (ref 4.5–11.5)

## 2022-07-18 PROCEDURE — 6360000004 HC RX CONTRAST MEDICATION: Performed by: RADIOLOGY

## 2022-07-18 PROCEDURE — 95816 EEG AWAKE AND DROWSY: CPT | Performed by: PSYCHIATRY & NEUROLOGY

## 2022-07-18 PROCEDURE — 85025 COMPLETE CBC W/AUTO DIFF WBC: CPT

## 2022-07-18 PROCEDURE — 82962 GLUCOSE BLOOD TEST: CPT

## 2022-07-18 PROCEDURE — 2500000003 HC RX 250 WO HCPCS: Performed by: INTERNAL MEDICINE

## 2022-07-18 PROCEDURE — 70553 MRI BRAIN STEM W/O & W/DYE: CPT

## 2022-07-18 PROCEDURE — 6360000002 HC RX W HCPCS: Performed by: PSYCHIATRY & NEUROLOGY

## 2022-07-18 PROCEDURE — 80053 COMPREHEN METABOLIC PANEL: CPT

## 2022-07-18 PROCEDURE — 2580000003 HC RX 258: Performed by: INTERNAL MEDICINE

## 2022-07-18 PROCEDURE — 6370000000 HC RX 637 (ALT 250 FOR IP): Performed by: INTERNAL MEDICINE

## 2022-07-18 PROCEDURE — 36415 COLL VENOUS BLD VENIPUNCTURE: CPT

## 2022-07-18 PROCEDURE — 1200000000 HC SEMI PRIVATE

## 2022-07-18 PROCEDURE — 95816 EEG AWAKE AND DROWSY: CPT

## 2022-07-18 PROCEDURE — A9577 INJ MULTIHANCE: HCPCS | Performed by: RADIOLOGY

## 2022-07-18 PROCEDURE — 93306 TTE W/DOPPLER COMPLETE: CPT

## 2022-07-18 RX ORDER — 0.9 % SODIUM CHLORIDE 0.9 %
500 INTRAVENOUS SOLUTION INTRAVENOUS ONCE
Status: COMPLETED | OUTPATIENT
Start: 2022-07-18 | End: 2022-07-18

## 2022-07-18 RX ADMIN — LEVETIRACETAM 750 MG: 15 INJECTION INTRAVENOUS at 21:02

## 2022-07-18 RX ADMIN — APIXABAN 2.5 MG: 2.5 TABLET, FILM COATED ORAL at 09:17

## 2022-07-18 RX ADMIN — METOPROLOL TARTRATE 5 MG: 1 INJECTION, SOLUTION INTRAVENOUS at 06:02

## 2022-07-18 RX ADMIN — CALCIUM POLYCARBOPHIL 625 MG: 625 TABLET, FILM COATED ORAL at 09:17

## 2022-07-18 RX ADMIN — LOSARTAN POTASSIUM 100 MG: 25 TABLET, FILM COATED ORAL at 09:17

## 2022-07-18 RX ADMIN — INSULIN LISPRO 2 UNITS: 100 INJECTION, SOLUTION INTRAVENOUS; SUBCUTANEOUS at 09:30

## 2022-07-18 RX ADMIN — NYSTATIN: 100000 CREAM TOPICAL at 09:18

## 2022-07-18 RX ADMIN — METFORMIN HYDROCHLORIDE 500 MG: 500 TABLET ORAL at 09:17

## 2022-07-18 RX ADMIN — INSULIN LISPRO 4 UNITS: 100 INJECTION, SOLUTION INTRAVENOUS; SUBCUTANEOUS at 12:57

## 2022-07-18 RX ADMIN — SODIUM CHLORIDE 500 ML: 9 INJECTION, SOLUTION INTRAVENOUS at 15:54

## 2022-07-18 RX ADMIN — AMIODARONE HYDROCHLORIDE 200 MG: 200 TABLET ORAL at 09:17

## 2022-07-18 RX ADMIN — LEVETIRACETAM 750 MG: 15 INJECTION INTRAVENOUS at 06:02

## 2022-07-18 RX ADMIN — GADOBENATE DIMEGLUMINE 13 ML: 529 INJECTION, SOLUTION INTRAVENOUS at 19:43

## 2022-07-18 RX ADMIN — APIXABAN 2.5 MG: 2.5 TABLET, FILM COATED ORAL at 21:01

## 2022-07-18 RX ADMIN — NYSTATIN: 100000 CREAM TOPICAL at 21:01

## 2022-07-18 NOTE — PROGRESS NOTES
Grant Gupta MD FACP  Internal medicine  Progress Notes      CHIEF COMPLAINT: Abnormal body movements      HISTORY OF PRESENT ILLNESS:    22  Patient was seen on the floor earlier this morning at the main campus of [facility]  Son is at bedside  Data reviewed in detail  Spoke with the ER physician at the time of admission  I called her other son who is a neurosurgeon and got a better history  80-year-old  woman with advanced dementia  Lives at home with   Apparently was noted to have these abnormal extremity movements which the son described as myoclonic jerks but not seizures  She never had any oral injury or postictal status  Her CK is completely normal  Apparently these episodes are happening for the last 2 weeks since she took 1 dose of methylphenidate For hypersomnolence  Later in the day today she developed atrial fibrillation with rapid ventricular response  Cardiology is being consulted  Currently on prophylactic Keppra  MRI EEG pending  22  Patient was seen on the floor earlier this morning  Essentially unchanged  Remains in A. fib    Past Medical History:    Past Medical History:   Diagnosis Date    Diabetes mellitus ([facility] [address]     DVT (deep venous thrombosis) ([facility] [address] 2011    left leg    Fall     recent, falls x3    Hypertension        Past Surgical History:    Past Surgical History:   Procedure Laterality Date     SECTION      x4    COLONOSCOPY      HIP SURGERY      right    KNEE SURGERY      left       Medications Prior to Admission:    Medications Prior to Admission: nitrofurantoin, macrocrystal-monohydrate, (MACROBID) 100 MG capsule, Take 100 mg by mouth in the morning and 100 mg before bedtime. methylphenidate (RITALIN) 5 MG tablet, Take 5 mg by mouth in the morning.   polycarbophil (FIBERCON) 625 MG tablet, Take 1 tablet by mouth daily (Patient not taking: Reported on 2022)  metFORMIN (GLUCOPHAGE) 500 MG tablet, Take 500 mg by mouth daily (with breakfast) (Patient not taking: Reported on 7/17/2022)  olmesartan (BENICAR) 20 MG tablet, Take 40 mg by mouth daily. (Patient not taking: Reported on 7/17/2022)    Allergies:    Sulfa antibiotics    Social History:    reports that she has never smoked. She has never used smokeless tobacco. She reports current alcohol use of about 1.0 standard drink per week. She reports that she does not use drugs. Family History:   family history is not on file. REVIEW OF SYSTEMS:  As above in the HPI, otherwise negative    PHYSICAL EXAM:    Vitals:  /60   Pulse 94   Temp 97.9 °F (36.6 °C) (Oral)   Resp 18   Ht 5' 5\" (1.651 m)   Wt 133 lb (60.3 kg)   SpO2 97%   BMI 22.13 kg/m²     General:  Awake, alert, she does not recall any events leading to hospitalization  Follows commands  HEENT:  Normocephalic, atraumatic. Pupils equal, round, reactive to light. No scleral icterus. No conjunctival injection. No nasal discharge. Neck:  Supple  Heart: Irregular not rapid  Lungs:  CTA bilaterally, bilat symmetrical expansion, no wheeze, rales, or rhonchi  Abdomen:   Bowel sounds present, soft, nontender, no masses, no organomegaly, no peritoneal signs  Extremities:  No clubbing, cyanosis, or edema  Skin:  Warm and dry, no open lesions or rash  Neuro:  Cranial nerves 2-12 intact, no focal deficits  Breast: deferred  Rectal: deferred  Genitalia:  deferred    LABS:  Data reviewed      ASSESSMENT:      Principal Problem:    Seizure-like activity ([address]  Likely myoclonic jerks  New onset of atrial fibrillation with rapid ventricular response  Advanced dementia      PLAN:  Spoke with her son who is a neurosurgeon 7/17  Discussed the diagnostic plans at length  Cardiology consult  Work-up in progress      Rosie Hinds MD  2:29 PM  7/18/2022

## 2022-07-18 NOTE — CARE COORDINATION
7/18/22 Transition of Care: Patient is here in isolation as [facility] due to covid positive. She resides at home with her . She does have advanced dementia and has been on some \"new\" medicines. She resides with her  and does have private duty homecare currently. She follows with dr Sincere Sellers and her pharmacy is [facility] in [facility]. Attempts to reach her  failed. Call placed to her daughter Leonardo Hathaway who states she is the  but resides in [place]. Also the patients son, Benjamin Preciado, in [place] is a Neurosurgeon. He is coming into town this week. They will make a decision on skilled vs home based on EEG and MRI results. MRI remains pending. PT/OT evals pending. Patient remains on iv keppra bid. Daughter states she will await final determination from neurology to make a decision. She did speak with her father regarding the need for 24hour care and possible skilled rehab. Will follow.  Electronically signed by Samaria Wagner RN CM on 7/18/2022 at 3:43 PM

## 2022-07-18 NOTE — PROCEDURES
EEG Report  Rosi Salvador is a 80 y.o. female      Appointment Date 07/18/2022 Appointment Time  [id number] [facility]. EEG Number [id number]   Type of Study EEG Floor 8402-B     Technical Specifications  Technician Copen of consciousness Closed eyes, not alert and moaning   Sleep deprived? ? Hyperventilation tested? no   Photic stim tested? no   EEG recording Standard 10-20 electrode placement    Duration of recording 25+   EEG complete? yes       Clinical History   Rosi Salvador is a 80 y.o. right handed female presenting for evaluation of seizure. The patient reportedly had an event where she stiffened up followed by some jerking. She had another episode this morning. With the first episode the patient was also noted to have SBP in the 80s. She was also found to have afib with RVR this admission.      Medications    Current Facility-Administered Medications:     nystatin (MYCOSTATIN) cream, , Topical, BID, Traci Taylor MD, Given at 07/18/22 0918    0.9 % sodium chloride infusion, , IntraVENous, Continuous, Traci Taylor MD, Last Rate: 75 mL/hr at 07/17/22 1423, New Bag at 07/17/22 1423    levETIRAcetam (KEPPRA) 750 mg/50 mL IVPB, 750 mg, IntraVENous, Q12H, Michael Malik DO, Stopped at 07/18/22 7087    amiodarone (CORDARONE) tablet 200 mg, 200 mg, Oral, Daily, Delwyn Fling, DO, 200 mg at 07/18/22 3047    apixaban (ELIQUIS) tablet 2.5 mg, 2.5 mg, Oral, BID, Delwyn Fling, DO, 2.5 mg at 07/18/22 1875    perflutren lipid microspheres (DEFINITY) injection 1.65 mg, 1.5 mL, IntraVENous, ONCE PRN, Delwyn Fling, DO    metFORMIN (GLUCOPHAGE) tablet 500 mg, 500 mg, Oral, Daily with breakfast, Traci Taylor MD, 500 mg at 07/18/22 0917    losartan (COZAAR) tablet 100 mg, 100 mg, Oral, Daily, Traci Taylor MD, 100 mg at 07/18/22 0917    polycarbophil (FIBERCON) tablet 625 mg, 625 mg, Oral, Daily, Traci Taylor MD, 625 mg at 07/18/22 0917    acetaminophen (TYLENOL) tablet 500 mg, 500 mg, Oral, Q4H PRN, Rony Mckinney MD    ondansetron [facility]) injection 4 mg, 4 mg, IntraVENous, Q6H PRN, Rony Mckinney MD    insulin lispro (HUMALOG) injection vial 0-12 Units, 0-12 Units, SubCUTAneous, TID WC, Rony Mckinney MD, 2 Units at 07/18/22 0930    insulin lispro (HUMALOG) injection vial 0-6 Units, 0-6 Units, SubCUTAneous, Nightly, Rony Mckinney MD, 2 Units at 07/17/22 2244    glucose chewable tablet 16 g, 4 tablet, Oral, PRN, Rony Mckinney MD    dextrose bolus 10% 125 mL, 125 mL, IntraVENous, PRN **OR** dextrose bolus 10% 250 mL, 250 mL, IntraVENous, PRN, Rony Mckinney MD    glucagon (rDNA) injection 1 mg, 1 mg, IntraMUSCular, PRN, Rony Mckinney MD    dextrose 5 % solution, 100 mL/hr, IntraVENous, PRN, Rony Mckinney MD        Physician Interpretation    General EEG Report  EEG study was performed using the 10-20 electrode placement system in patient who was lethargic and responsive at time of study. No abnormal behavior or movements noted during the study. Activation procedures included photic stimulation and hyperventilation. Type of EEG:   Routine Inpatient EEG with video    Description   Background: Normal amplitude background with was symmetric with a great deal of superimposed predominantly frontal beta activity from muscle artifact. Poorly discernible alpha activity. Patient was somnolent throughout study. Prominent slowing in form of theta activity across leads with loss of posterior alpha activity indicating likely sleep. Normal sleep architecture not present with note of intermittent brief pauses suggesting likely sedation effect. No spike and wave activity significant sharp discharges appreciated. Photic stimulation: Not performed  Hyperventilation: Not performed    General Impression  Abnormal EEG with noted mild background slowing consistent with likely sedation effect versus mild encephalopathy. No abnormal discharges or epileptiform activity appreciated.   No correlation is indicated.     MD Doretha Rodriguez

## 2022-07-18 NOTE — PLAN OF CARE
Problem: Safety - Adult  Goal: Free from fall injury  7/18/2022 1108 by Sarhai Costa RN  Outcome: Progressing Towards Goal     Problem: ABCDS Injury Assessment  Goal: Absence of physical injury  7/18/2022 1108 by Sarahi Costa RN  Outcome: Progressing Towards Goal     Problem: Skin/Tissue Integrity  Goal: Absence of new skin breakdown  Description: 1. Monitor for areas of redness and/or skin breakdown  2. Assess vascular access sites hourly  3. Every 4-6 hours minimum:  Change oxygen saturation probe site  4. Every 4-6 hours:  If on nasal continuous positive airway pressure, respiratory therapy assess nares and determine need for appliance change or resting period.   7/18/2022 1108 by Sarahi Costa RN  Outcome: Progressing Towards Goal

## 2022-07-18 NOTE — PLAN OF CARE
Notes  reviewed. No changes overnight. Awaiting MRI brain and EEG to be completed. Will follow on completion.  Please call with any neuro concerns in the interim

## 2022-07-18 NOTE — CONSULTS
[facility]  Neurology Consult    Date:  2022  Patient Name:  Shelly Palacio  YOB: 1931  MRN: [id number]     PCP:  Gladys Lau DO   Referring:  No ref. provider found      Chief Complaint: possible seizure    History obtained from: patient, family    Damari Jones is a 80 y.o. female admitted for evaluation of spells of uncertain etiology. Differential does include seizures as well as convulsive syncope in setting of new onset afib with RVR. Plan  MRI brain w/o contrast  EEG  Consider prolonged EEG for spell capture if they remain frequent  Seizure safety precautions  Can continue Keppra 750 BID for now - discontinue if further episodes identified as non-epileptic        History of Present Illness:  Shelly Palacio is a 80 y.o. right handed female presenting for evaluation of seizure. The patient reportedly had an event where she stiffened up followed by some jerking. She had another episode this morning. With the first episode the patient was also noted to have SBP in the 80s. She was also found to have afib with RVR this admission. Review of Systems:  Unable to obtain review of symptoms due to poor historian    Medical History:   Past Medical History:   Diagnosis Date    Diabetes mellitus ([facility] [address]     DVT (deep venous thrombosis) ([facility] [address] 2011    left leg    Fall     recent, falls x3    Hypertension         Surgical History:   Past Surgical History:   Procedure Laterality Date     SECTION      x4    COLONOSCOPY      HIP SURGERY      right    KNEE SURGERY      left        Family History:   No history of seizures reported    Social History:  Social History     Tobacco Use    Smoking status: Never    Smokeless tobacco: Never   Substance Use Topics    Alcohol use:  Yes     Alcohol/week: 1.0 standard drink     Types: 1 Glasses of wine per week     Comment: rare    Drug use: No        Current Medications:      Current Facility-Administered Medications   Medication Dose Route Frequency Provider Last Rate Last Admin    nystatin (MYCOSTATIN) cream   Topical BID Rosie Hinds MD        0.9 % sodium chloride infusion   IntraVENous Continuous Rosie Hinds MD 75 mL/hr at 07/17/22 1423 New Bag at 07/17/22 1423    levETIRAcetam (KEPPRA) 750 mg/50 mL IVPB  750 mg IntraVENous Q12H Evaline Gather, DO   Stopped at 07/17/22 1920    metoprolol (LOPRESSOR) injection 5 mg  5 mg IntraVENous Q6H Rosie Hinds MD   5 mg at 07/17/22 1706    amiodarone (CORDARONE) tablet 200 mg  200 mg Oral Daily Jyoti Gonzalez DO   200 mg at 07/17/22 2023    [START ON 7/18/2022] apixaban (ELIQUIS) tablet 2.5 mg  2.5 mg Oral BID Jyoti Gonzalez DO        perflutren lipid microspheres (DEFINITY) injection 1.65 mg  1.5 mL IntraVENous ONCE PRN Jyoti Gonzalez DO        metFORMIN (GLUCOPHAGE) tablet 500 mg  500 mg Oral Daily with breakfast Rosie Hinds MD   500 mg at 07/17/22 0849    losartan (COZAAR) tablet 100 mg  100 mg Oral Daily Rosie Hinds MD   100 mg at 07/17/22 0849    polycarbophil (FIBERCON) tablet 625 mg  625 mg Oral Daily Rosie Hinds MD        acetaminophen (TYLENOL) tablet 500 mg  500 mg Oral Q4H PRN Rosie Hinds MD        ondansetron [facility]) injection 4 mg  4 mg IntraVENous Q6H PRN Rosie Hinds MD        insulin lispro (HUMALOG) injection vial 0-12 Units  0-12 Units SubCUTAneous TID WC Rosie Hinds MD   2 Units at 07/17/22 1815    insulin lispro (HUMALOG) injection vial 0-6 Units  0-6 Units SubCUTAneous Nightly Rosie Hinds MD        glucose chewable tablet 16 g  4 tablet Oral PRN Rosie Hinds MD        dextrose bolus 10% 125 mL  125 mL IntraVENous PRN Rosie Hinds MD        Or    dextrose bolus 10% 250 mL  250 mL IntraVENous PRN Rosie Hinds MD        glucagon (rDNA) injection 1 mg  1 mg IntraMUSCular PRN Rosie Hinds MD        dextrose 5 % solution  100 mL/hr IntraVENous PRN Rosie Hinds MD            Allergies: 135 143   K 3.3* 3.7   CL 96* 107   CO2 27 22   BUN 11 6   CREATININE 0.6 0.4*   GLUCOSE 369* 220*   CALCIUM 9.1 8.0*   PROT  --  6.0*   LABALBU  --  3.5   BILITOT  --  1.5*   ALKPHOS  --  74   AST  --  18   ALT  --  10   WBC 7.4 5.7   RBC 4.71 4.75   HGB 14.5 14.7   HCT 43.2 43.5   MCV 91.7 91.6   MCH 30.8 30.9   MCHC 33.6 33.8   RDW 12.8 12.7    192   MPV 9.5 9.4   LACTA 1.9  --          Imaging  CT Head WO Contrast   Final Result   1. No acute intracranial hemorrhage or edema. 2.  Ethmoid and sphenoid sinusitis.          MRI BRAIN W WO CONTRAST    (Results Pending)           Electronically signed by Uma Aranda DO on 7/17/2022 at 9:33 PM

## 2022-07-19 LAB
ALBUMIN SERPL-MCNC: 3.4 G/DL (ref 3.5–5.2)
ALP BLD-CCNC: 65 U/L (ref 35–104)
ALT SERPL-CCNC: 8 U/L (ref 0–32)
ANION GAP SERPL CALCULATED.3IONS-SCNC: 12 MMOL/L (ref 7–16)
AST SERPL-CCNC: 16 U/L (ref 0–31)
BASOPHILS ABSOLUTE: 0.04 E9/L (ref 0–0.2)
BASOPHILS RELATIVE PERCENT: 0.7 % (ref 0–2)
BILIRUB SERPL-MCNC: 1.3 MG/DL (ref 0–1.2)
BUN BLDV-MCNC: 14 MG/DL (ref 6–23)
CALCIUM SERPL-MCNC: 8.8 MG/DL (ref 8.6–10.2)
CHLORIDE BLD-SCNC: 107 MMOL/L (ref 98–107)
CO2: 24 MMOL/L (ref 22–29)
CREAT SERPL-MCNC: 1 MG/DL (ref 0.5–1)
EOSINOPHILS ABSOLUTE: 0.15 E9/L (ref 0.05–0.5)
EOSINOPHILS RELATIVE PERCENT: 2.6 % (ref 0–6)
GFR AFRICAN AMERICAN: >60
GFR NON-AFRICAN AMERICAN: 52 ML/MIN/1.73
GLUCOSE BLD-MCNC: 162 MG/DL (ref 74–99)
HCT VFR BLD CALC: 43.1 % (ref 34–48)
HEMOGLOBIN: 14.1 G/DL (ref 11.5–15.5)
IMMATURE GRANULOCYTES #: 0.02 E9/L
IMMATURE GRANULOCYTES %: 0.3 % (ref 0–5)
LYMPHOCYTES ABSOLUTE: 1.79 E9/L (ref 1.5–4)
LYMPHOCYTES RELATIVE PERCENT: 30.9 % (ref 20–42)
MCH RBC QN AUTO: 30.8 PG (ref 26–35)
MCHC RBC AUTO-ENTMCNC: 32.7 % (ref 32–34.5)
MCV RBC AUTO: 94.1 FL (ref 80–99.9)
METER GLUCOSE: 102 MG/DL (ref 74–99)
METER GLUCOSE: 158 MG/DL (ref 74–99)
METER GLUCOSE: 213 MG/DL (ref 74–99)
METER GLUCOSE: 287 MG/DL (ref 74–99)
MONOCYTES ABSOLUTE: 0.49 E9/L (ref 0.1–0.95)
MONOCYTES RELATIVE PERCENT: 8.4 % (ref 2–12)
NEUTROPHILS ABSOLUTE: 3.31 E9/L (ref 1.8–7.3)
NEUTROPHILS RELATIVE PERCENT: 57.1 % (ref 43–80)
PDW BLD-RTO: 13.4 FL (ref 11.5–15)
PLATELET # BLD: 215 E9/L (ref 130–450)
PMV BLD AUTO: 9.7 FL (ref 7–12)
POTASSIUM SERPL-SCNC: 4.3 MMOL/L (ref 3.5–5)
RBC # BLD: 4.58 E12/L (ref 3.5–5.5)
SODIUM BLD-SCNC: 143 MMOL/L (ref 132–146)
TOTAL PROTEIN: 5.8 G/DL (ref 6.4–8.3)
WBC # BLD: 5.8 E9/L (ref 4.5–11.5)

## 2022-07-19 PROCEDURE — 97165 OT EVAL LOW COMPLEX 30 MIN: CPT

## 2022-07-19 PROCEDURE — 6360000002 HC RX W HCPCS: Performed by: PSYCHIATRY & NEUROLOGY

## 2022-07-19 PROCEDURE — 85025 COMPLETE CBC W/AUTO DIFF WBC: CPT

## 2022-07-19 PROCEDURE — 6360000002 HC RX W HCPCS: Performed by: PHYSICIAN ASSISTANT

## 2022-07-19 PROCEDURE — 97161 PT EVAL LOW COMPLEX 20 MIN: CPT

## 2022-07-19 PROCEDURE — 80053 COMPREHEN METABOLIC PANEL: CPT

## 2022-07-19 PROCEDURE — 36415 COLL VENOUS BLD VENIPUNCTURE: CPT

## 2022-07-19 PROCEDURE — 6370000000 HC RX 637 (ALT 250 FOR IP): Performed by: INTERNAL MEDICINE

## 2022-07-19 PROCEDURE — 1200000000 HC SEMI PRIVATE

## 2022-07-19 PROCEDURE — 82962 GLUCOSE BLOOD TEST: CPT

## 2022-07-19 RX ORDER — LEVETIRACETAM 5 MG/ML
500 INJECTION INTRAVASCULAR EVERY 12 HOURS
Status: DISCONTINUED | OUTPATIENT
Start: 2022-07-19 | End: 2022-07-23 | Stop reason: HOSPADM

## 2022-07-19 RX ADMIN — APIXABAN 2.5 MG: 2.5 TABLET, FILM COATED ORAL at 22:29

## 2022-07-19 RX ADMIN — AMIODARONE HYDROCHLORIDE 200 MG: 200 TABLET ORAL at 08:52

## 2022-07-19 RX ADMIN — LOSARTAN POTASSIUM 100 MG: 25 TABLET, FILM COATED ORAL at 08:52

## 2022-07-19 RX ADMIN — LEVETIRACETAM 750 MG: 15 INJECTION INTRAVENOUS at 09:07

## 2022-07-19 RX ADMIN — INSULIN LISPRO 4 UNITS: 100 INJECTION, SOLUTION INTRAVENOUS; SUBCUTANEOUS at 10:46

## 2022-07-19 RX ADMIN — METFORMIN HYDROCHLORIDE 500 MG: 500 TABLET ORAL at 08:52

## 2022-07-19 RX ADMIN — INSULIN LISPRO 6 UNITS: 100 INJECTION, SOLUTION INTRAVENOUS; SUBCUTANEOUS at 13:32

## 2022-07-19 RX ADMIN — APIXABAN 2.5 MG: 2.5 TABLET, FILM COATED ORAL at 08:52

## 2022-07-19 RX ADMIN — INSULIN LISPRO 1 UNITS: 100 INJECTION, SOLUTION INTRAVENOUS; SUBCUTANEOUS at 22:41

## 2022-07-19 RX ADMIN — LEVETIRACETAM 500 MG: 5 INJECTION INTRAVENOUS at 22:39

## 2022-07-19 RX ADMIN — CALCIUM POLYCARBOPHIL 625 MG: 625 TABLET, FILM COATED ORAL at 08:51

## 2022-07-19 NOTE — PROGRESS NOTES
Called and spoke with RN- patient is doing well and has had no further spells. MRI brain showed disproportionate volume loss of the mesial temporal lobes. No acute findings. EEG shows a mild diffuse slowing. No epileptiform activity. Given her history of dementia, atrophy noted in the mesial temporal lobes and current infection- seizures are certainly a possibility as having dementia does increase the risk of seizure development. It is reasonable to continue Keppra at this time and follow up in the office in a couple weeks to see if any spells have recurred. Will reduce dose of Keppra to 500 mg BID (due to lethargy). Discussed with patient's daughter Gregg Child on the phone at this time and all questions answered. She is okay for d/c from neurology POV once medically cleared. Follow up with me in the office in a couple weeks.      Neuro will sign off, please call with questions or new issues     Juan Schuler PA-C

## 2022-07-19 NOTE — PROGRESS NOTES
PROGRESS NOTE       PATIENT PROBLEM LIST:  Patient Active Problem List   Diagnosis Code    Seizure-like activity ([facility] R56.9       SUBJECTIVE:  Bailey Mak again has no voiced complaints presently but is not well oriented to time place and person. REVIEW OF SYSTEMS:  General ROS: negative for - fatigue, malaise,  weight gain or weight loss  Psychological ROS: negative for - anxiety , depression; positive for - decreased cognitive abilities  Ophthalmic ROS: negative for - decreased vision or visual distortion. ENT ROS: negative  Allergy and Immunology ROS: negative  Hematological and Lymphatic ROS: negative  Endocrine: no heat or cold intolerance and no polyphagia, polydipsia, or polyuria  Respiratory ROS: negative for - hemoptysis, pleuritic pain, and wheezing  Cardiovascular ROS: positive for - irregular heartbeat and rapid heart rate and heart murmur. Gastrointestinal ROS: no abdominal pain, change in bowel habits, or black or bloody stools  Genito-Urinary ROS: no nocturia, dysuria, trouble voiding, frequency or hematuria  Musculoskeletal ROS: negative for- myalgias, arthralgias, or claudication  Neurological ROS: no TIA or stroke symptoms otherwise no significant change in symptoms or problems since yesterday as documented in previous progress notes.     SCHEDULED MEDICATIONS:   nystatin   Topical BID    levETIRAcetam  750 mg IntraVENous Q12H    amiodarone  200 mg Oral Daily    apixaban  2.5 mg Oral BID    metFORMIN  500 mg Oral Daily with breakfast    losartan  100 mg Oral Daily    polycarbophil  625 mg Oral Daily    insulin lispro  0-12 Units SubCUTAneous TID WC    insulin lispro  0-6 Units SubCUTAneous Nightly       VITAL SIGNS:                                                                                                                          BP (!) 135/58   Pulse 78   Temp 96.8 °F (36 °C) (Temporal)   Resp 18   Ht 5' 5\" (1.651 m)   Wt 133 lb (60.3 kg)   SpO2 94%   BMI 22.13 kg/m² Patient Vitals for the past 96 hrs (Last 3 readings):   Weight   07/16/22 2043 133 lb (60.3 kg)   07/16/22 1229 133 lb (60.3 kg)     OBJECTIVE:    HEENT: PERRL, EOM  Intact; sclera non-icteric, conjunctiva pink. Carotids are brisk in upstroke with normal contour. No carotid bruits. Normal jugular venous pulsation at 45°. No palpable cervical nor supraclavicular nodes. Thyroid not palpable. Trachea midline. Mouth appears very dry. Chest: Even excursion  Lungs: Grossly clear to auscultation bilaterally, no expiratory wheezes or rhonchi, no decreased tactile fremitus without inspiratory rales. Heart: Regular  rhythm; S1 > S2, no gallop ; Grade 2/6 systolic ejection murmur second right and left intercostal space as well as the apex. . No clicks, rub, palpable thrills   or heaves. PMI nondisplaced, 5th intercostal space MCL. Abdomen: Soft, nontender, nondistended,  mildly protuberant, no masses or organomegaly. Bowel sounds active. Extremities: Without clubbing, cyanosis or edema. Pulses present 3+ upper extermities bilaterally; present 1+ DP and present 1+ PT bilaterally. Data:   Scheduled Meds: Reviewed  Continuous Infusions:    sodium chloride 75 mL/hr at 07/17/22 1423    dextrose         Intake/Output Summary (Last 24 hours) at 7/18/2022 2245  Last data filed at 7/18/2022 1554  Gross per 24 hour   Intake 1235 ml   Output --   Net 1235 ml     CBC:   Recent Labs     07/16/22  1318 07/17/22  0536 07/18/22  0421 07/19/22  0431   WBC 7.4 5.7 6.9 5.8   HGB 14.5 14.7 13.7 14.1   HCT 43.2 43.5 40.6 43.1    192 206 215     BMP:  Recent Labs     07/16/22  1318 07/17/22  0536 07/18/22  0421 07/19/22  0431    143 144 143   K 3.3* 3.7 3.7 4.3   CL 96* 107 108* 107   CO2 27 22 23 24   BUN 11 6 13 14   CREATININE 0.6 0.4* 0.7 1.0   LABGLOM >60 >60 >60 52     ABGs:   Lab Results   Component Value Date/Time    PH 7.39 12/05/2011 06:10 PM     INR: No results for input(s): INR in the last 72 hours.   PRO-BNP: Lab Results   Component Value Date    PROBNP 118 04/11/2017    PROBNP 184 04/22/2015      TSH:   Lab Results   Component Value Date    TSH 7.990 (H) 04/23/2015      Cardiac Injury Profile:   Recent Labs     07/16/22  1318   TROPHS 20*      Lipid Profile: No results found for: TRIG, HDL, LDLCALC, CHOL   Hemoglobin A1C: No components found for: HGBA1C      RAD:   MRI BRAIN W WO CONTRAST   Final Result   1. No acute intracranial abnormality. 2.  Disproportionately prominent volume loss in the mesial temporal lobes, as   may be seen with Alzheimer's disease and other dementias. Clinical   correlation is needed. 3. No mass, mass effect, edema or hemorrhage. 4. Small air-fluid levels in the sphenoid sinuses. Clinical correlation for   possible acute sinusitis is recommended. RECOMMENDATIONS:   Unavailable         CT Head WO Contrast   Final Result   1. No acute intracranial hemorrhage or edema. 2.  Ethmoid and sphenoid sinusitis. EKG: See Report  Echo: See Report      IMPRESSIONS:  Patient Active Problem List   Diagnosis Code    Seizure-like activity [facility]) R56.9       RECOMMENDATIONS:  Mrs. Carole Blackwood still remains in atrial fibrillation but with a generally controlled response. We will probably convert within the next 72 hours. Hopefully the combination of a highly effective antiarrhythmic and anticoagulant will reduce any further morbidity. I have spent more than 26 minutes face to face with Jose Otoole and reviewing notes and laboratory data, with greater than 50% of this time instructing and counseling the patient  face to face regarding my findings and recommendations and I have answered all questions as posed to me by Ms. Carole Blackwood. Janus Hodgkins, DO FACP,FACC,FSCAI      NOTE:  This report was transcribed using voice recognition software.   Every effort was made to ensure accuracy; however, inadvertent computerized transcription errors may be present

## 2022-07-19 NOTE — PROGRESS NOTES
[address]        Date:2022                                                  Patient Name: Ulices Roy    MRN: [id number]    : 5/10/1931    Room: [address] [place] [address]          Evaluating OT: Shilpa Jefferson OTR/L; [id number]       Referring Provider: Renate Galarza MD    Specific Provider Orders/Date: OT Eval and Treat 22       Diagnosis: Seizure-like activity     Surgery: None this admission     Pertinent Medical History:  has a past medical history of Diabetes mellitus ([facility] [address] DVT (deep venous thrombosis) ([facility] [address] Fall, and Hypertension.      Recommended Adaptive Equipment: TBD     Precautions:  Fall Risk, TSM, +alarms, cognition, droplet+ precautions COVID-19     Assessment of current deficits    [x] Functional mobility  [x]ADLs  [x] Strength               [x]Cognition    [x] Functional transfers   [x] IADLs         [x] Safety Awareness   [x]Endurance    [] Fine Coordination              [x] Balance      [] Vision/perception   []Sensation     []Gross Motor Coordination  [] ROM  [] Delirium                   [] Motor Control     OT PLAN OF CARE   OT POC based on physician orders, patient diagnosis and results of clinical assessment    Frequency/Duration 1-3 days/wk for 2 weeks PRN   Specific OT Treatment Interventions to include:   * Instruction/training on adapted ADL techniques and AE recommendations to increase functional independence within precautions       * Training on energy conservation strategies, correct breathing pattern and techniques to improve independence/tolerance for self-care routine  * Functional transfer/mobility training/DME recommendations for increased independence, safety, and fall prevention  * Patient/Family education to increase follow through with safety techniques and functional independence  * Recommendation of environmental modifications for increased safety with functional transfers/mobility and ADLs  * Cognitive retraining/development of therapeutic activities to improve problem solving, judgement, memory, and attention for increased safety/participation in ADL/IADL tasks  * Therapeutic exercise to improve motor endurance, ROM, and functional strength for ADLs/functional transfers  * Therapeutic activities to facilitate/challenge dynamic balance, stand tolerance for increased safety and independence with ADLs  * Positioning to improve skin integrity, interaction with environment and functional independence    Pt poor historian. Home Living: Per chart review pt lives with spouse & has advanced dementia. Bathroom setup: ?   Equipment owned: ?  Prior Level of Function: ? with ADLs , ? with IADLs; engaged in functional mobility with use of  ? Driving: ?  Occupation: ?    Pain Level: Pt with no c/o pain or signs/symptoms throughout session  Cognition: A&O: 1/4; Follows 1 step directions ~25% of the time with max verbal/tactile cues. Pt requires hand over hand assist for task initiation. Memory:  Poor   Sequencing:  Poor   Problem solving:  Poor   Judgement/safety:  Poor     Functional Assessment:  AM-PAC Daily Activity Raw Score: 12/24   Initial Eval Status  Date: 7/19/22 Treatment Status  Date: STGs = LTGs  Time frame: 10-14 days   Feeding Maximal Assist   SBA   Grooming Maximal Assist   Difficulty following commands & sequencing tasks. SBA   UB Dressing Maximal Assist  Assist to tie gown seated EOB  SBA   LB Dressing Maximal Assist   Assist to manage socks seated EOB.   SBA   Bathing Maximal Assist  SBA   Toileting Maximal Assist   SBA   Bed Mobility  Supine to sit: Maximal Assist   Sit to supine: Maximal Assist   Supine to sit: Minimal Assist   Sit to supine: Stand by Assist    Functional Transfers Sit to stand:Maximal Assist   Stand to sit:Maximal Assist  Stand pivot: Maximal Assist  Commode: NT  Sit to stand:SBA   Stand to sit:SBA  Stand pivot: SBA  Commode: SBA    Functional Mobility Maximal Assist  Use of ww shorter than household distance  Stand by Assist with use of Appropriate AD   Balance Sitting:     Static - Minimal Assist     Dynamic - Minimal Assist  Standing: Maximal Assist  Sitting:     Static:  Modified Ellis     Dynamic: Modified Ellis  Standing: SBA   Activity Tolerance Fair-  Good   Visual/  Perceptual Glasses: None reported  Appears WFL        Safety Poor  Good  during ADL completion     Hand Dominance ? AROM (PROM) Strength Additional Info:  Goal:   RUE  Pt difficulty following commands. Functionally observed WFL. Pt difficulty following commands, atleast 3/5. good  and wfl FMC/dexterity noted during ADL tasks   Improve overall RUE strength WFL for participation in functional tasks       LUE Pt difficulty following commands. Functionally observed WFL. Pt difficulty following commands, atleast 3/5. Good  and wfl FMC/dexterity noted during ADL tasks   Improve overall LUE strength WFL for participation in functional tasks       Hearing: [facility]  Sensation:  No c/o numbness or tingling BUE  Tone: WFL BUE  Edema: Unremarkable    Comment: Cleared by RN to see pt. Upon arrival patient supine in bed and agreeable to OT session. At end of session, patient supine in bed with call light and phone within reach, +bed alarm, TSM, all lines and tubes intact. Overall patient demonstrated decreased independence and safety during completion of ADL/functional transfer/mobility tasks. Pt noted to be tachycardic upon arrival - RN in room & aware. Pt required seated rest breaks throughout session d/t HR. Pt required max verbal/tactile cues to follow simple, 1-step commands ~25% of the time. Therapist facilitated ADL tasks, functional transfers, functional mobility, bed mobility to address safety awareness, implementation of fall prevention strategies, & functional engagement throughout daily activities.  Pt would benefit from continued skilled OT to increase safety and independence with completion of ADL/IADL tasks for functional independence and quality of life. Rehab Potential: Good for established goals     LTG: maximize independence with ADLs to return to PLOF    Patient and/or family were instructed on functional diagnosis, prognosis/goals and OT plan of care. Demonstrated fair understanding. Eval Complexity: Low  History: Expanded chart review of medical records and additional review of physical, cognitive, or psychosocial history related to current functional performance  Exam: 3+ performance deficits  Assistance/Modification: Min/mod assistance or modifications required to perform tasks. May have comorbidities that affect occupational performance. Evaluation time includes thorough review of current medical information, gathering information on past medical & social history & PLOF, completion of standardized testing, informal observation of tasks, consultation with other medical professions/disciplines, assessment of data & development of POC/goals. Time In: 9:15a  Time Out: 9:30a  Total Treatment Time: 0 minutes    Min Units   OT Eval Low 30692  x     OT Eval Medium 49365      OT Eval High 11782      OT Re-Eval [id number]       Therapeutic Ex 95128       Therapeutic Activities 95177       ADL/Self Care 82507       Orthotic Management 82924       Manual 92685     Neuro Re-Ed 71978       Non-Billable Time          Evaluation Time additionally includes thorough review of current medical information, gathering information on past medical history/social history and prior level of function, interpretation of standardized testing/informal observation of tasks, assessment of data and development of plan of care and goals.             Payton Montoya OTR/L; [id number]

## 2022-07-19 NOTE — PLAN OF CARE
Problem: Safety - Adult  Goal: Free from fall injury  Outcome: Progressing Towards Goal     Problem: ABCDS Injury Assessment  Goal: Absence of physical injury  Outcome: Progressing Towards Goal     Problem: Skin/Tissue Integrity  Goal: Absence of new skin breakdown  Description: 1. Monitor for areas of redness and/or skin breakdown  2. Assess vascular access sites hourly  3. Every 4-6 hours minimum:  Change oxygen saturation probe site  4. Every 4-6 hours:  If on nasal continuous positive airway pressure, respiratory therapy assess nares and determine need for appliance change or resting period.   Outcome: Progressing Towards Goal

## 2022-07-19 NOTE — PROGRESS NOTES
Physical Therapy  Physical Therapy Initial Assessment       Name: Maryuri Rawls  : 5/10/1931  MRN: [id number]      Date of Service: 2022    Evaluating PT:  Sandy Armas PT, DPT  [id number]    Room #:  4878/2504-K  Diagnosis:  Seizure-like activity ([facility] [address] [R56.9]  PMHx/PSHx:   has a past medical history of Diabetes mellitus ([facility] [address] DVT (deep venous thrombosis) ([facility] Fall, and Hypertension. Procedure/Surgery:    Precautions:  Falls, Droplet Plus, Dementia, Cognition, Alarm, TSM  Equipment Needs:  TBD    SUBJECTIVE:    Pt is a poor historian. Per chart pt lives with spouse and has advanced dementia. Pt has private duty homecare currently. Pts PLOF unknown. Equipment Owned:     OBJECTIVE:   Initial Evaluation  Date: 22 Treatment Short Term/ Long Term   Goals   AM-PAC 6 Clicks      Was pt agreeable to Eval/treatment? Yes     Does pt have pain? No c/o pain     Bed Mobility  Rolling: MaxA  Supine to sit: MaxA  Sit to supine: MaxA  Scooting: MaxA  Rolling: supervision  Supine to sit: supervision  Sit to supine: supervision  Scooting: supervision   Transfers Sit to stand: MaxA  Stand to sit: MaxA  Stand pivot: NT  Sit to stand: supervision  Stand to sit: supervision  Stand pivot: supervision   Ambulation    3 feet laterally MaxA WW  >150 feet with supervision AAD   Stair negotiation: ascended and descended  NT  >4 steps with single rail supervision   ROM BUE:  Defer to OT  BLE:  WFL     Strength BUE:  Defer to OT  BLE:  3/5 observed with functional mobility  Improve 1 MMT   Balance Sitting EOB:  Homero  Dynamic Standing:  MaxA [employer]  Sitting EOB:  Independent    Dynamic Standing:  supervision     Pt is A & O x (self). Disoriented to place, time, and situation. Pt requiring max cues and hand over hand facilitation for safety due to cognition.    Sensation:  Unable to assess accurately due to cognition  Edema:  WNl    Vitals:    -171  Spo2 96% RA  PRE  -171  Spo2 96% RA   ACTIVITY  -171  Spo2 96%  POST  Pt restless requiring cues for rest due to tachycardia    Therapeutic Exercises:  Functional mobility    Patient education  Pt educated on role of PT    Patient response to education:   Pt verbalized understanding Pt demonstrated skill Pt requires further education in this area     x     ASSESSMENT:    Conditions Requiring Skilled Therapeutic Intervention:    [x]Decreased strength     []Decreased ROM  [x]Decreased functional mobility  [x]Decreased balance   [x]Decreased endurance   []Decreased posture  []Decreased sensation  []Decreased coordination   []Decreased vision  [x]Decreased safety awareness   []Increased pain       Comments:  Pt agreeable to PT evaluation, confused oriented to self only. Pt restless in bed, requiring max cues and hand over hand facilitation for safety. Cues for intermittent rest breaks as pt activity limited by tachycardia. Pt unsteady in stance. Pt ambulation distance/ activity limited by tachycardia this date. Patient would benefit from continued skilled PT to maximize functional mobility independence. Alarm activated, TSM in room. Pt resting comfortably watching TV post session    Treatment:  Patient practiced and was instructed in the following treatment:    Bed mobility - verbal cues to facilitate independence  Functional transfers-Verbal cues for proper positioning and sequencing to perform transfers safely with maximum independence. Gait training-Verbal cues for proper positioning and sequencing using assistive device to maximize functional mobility independence. Pt's/ family goals   1. Get better    Prognosis is good for reaching above PT goals. Patient and or family understand(s) diagnosis, prognosis, and plan of care.   yes    PHYSICAL THERAPY PLAN OF CARE:    PT POC is established based on physician order and patient diagnosis     Referring provider/PT Order:    07/18/22 1545  PT eval and treat  Start:  07/18/22 1545,   End:  07/18/22 1545,   ONE TIME, Standing Count:  1 Occurrences,   Renetta Amezcua MD     Diagnosis:  Seizure-like activity ([facility] [address] [R56.9]  Specific instructions for next treatment:  Monitor vitals and perform transfers/gait     Current Treatment Recommendations:     [x] Strengthening to improve independence with functional mobility   [x] ROM to improve independence with functional mobility   [x] Balance Training to improve static/dynamic balance and to reduce fall risk  [x] Endurance Training to improve activity tolerance during functional mobility   [x] Transfer Training to improve safety and independence with all functional transfers   [x] Gait Training to improve gait mechanics, endurance and assess need for appropriate assistive device  [x] Stair Training in preparation for safe discharge home and/or into the community   [x] Positioning to prevent skin breakdown and contractures  [x] Safety and Education Training   [x] Patient/Caregiver Education   [x] HEP  [] Other     PT long term treatment goals are located in above grid    Frequency of treatments: 2-5x/week x 1-2 weeks. Time in  0933  Time out  0943    Total Treatment Time  0 minutes     Evaluation Time includes thorough review of current medical information, gathering information on past medical history/social history and prior level of function, completion of standardized testing/informal observation of tasks, assessment of data and education on plan of care and goals.     CPT codes:  [x] Low Complexity PT evaluation 32615  [] Moderate Complexity PT evaluation 17427  [] High Complexity PT evaluation 66515  [] PT Re-evaluation 37281  [] Gait training 40338 0 minutes  [] Manual therapy 21420 0 minutes  [] Therapeutic activities 39586 0 minutes  [] Therapeutic exercises 12361 0 minutes  [] Neuromuscular reeducation 26167 0 minutes       Michelle Left PT, DPT   [id number]

## 2022-07-19 NOTE — PROGRESS NOTES
PROGRESS NOTE       PATIENT PROBLEM LIST:  Patient Active Problem List   Diagnosis Code    Seizure-like activity ([facility] R56.9       SUBJECTIVE:  Caitlin Starr has no voiced complaints presently but is not well oriented to time place and person. REVIEW OF SYSTEMS:  General ROS: negative for - fatigue, malaise,  weight gain or weight loss  Psychological ROS: negative for - anxiety , depression; positive for - decreased cognitive abilities  Ophthalmic ROS: negative for - decreased vision or visual distortion. ENT ROS: negative  Allergy and Immunology ROS: negative  Hematological and Lymphatic ROS: negative  Endocrine: no heat or cold intolerance and no polyphagia, polydipsia, or polyuria  Respiratory ROS: negative for - hemoptysis, pleuritic pain, and wheezing  Cardiovascular ROS: positive for - irregular heartbeat and rapid heart rate and heart murmur. Gastrointestinal ROS: no abdominal pain, change in bowel habits, or black or bloody stools  Genito-Urinary ROS: no nocturia, dysuria, trouble voiding, frequency or hematuria  Musculoskeletal ROS: negative for- myalgias, arthralgias, or claudication  Neurological ROS: no TIA or stroke symptoms otherwise no significant change in symptoms or problems since yesterday as documented in previous progress notes.     SCHEDULED MEDICATIONS:   nystatin   Topical BID    levETIRAcetam  750 mg IntraVENous Q12H    amiodarone  200 mg Oral Daily    apixaban  2.5 mg Oral BID    metFORMIN  500 mg Oral Daily with breakfast    losartan  100 mg Oral Daily    polycarbophil  625 mg Oral Daily    insulin lispro  0-12 Units SubCUTAneous TID WC    insulin lispro  0-6 Units SubCUTAneous Nightly       VITAL SIGNS:                                                                                                                          BP (!) 102/45   Pulse 80   Temp 98.1 °F (36.7 °C) (Oral)   Resp 18   Ht 5' 5\" (1.651 m)   Wt 133 lb (60.3 kg)   SpO2 95%   BMI 22.13 kg/m²   Patient Vitals for the past 96 hrs (Last 3 readings):   Weight   07/16/22 2043 133 lb (60.3 kg)   07/16/22 1229 133 lb (60.3 kg)     OBJECTIVE:    HEENT: PERRL, EOM  Intact; sclera non-icteric, conjunctiva pink. Carotids are brisk in upstroke with normal contour. No carotid bruits. Normal jugular venous pulsation at 45°. No palpable cervical nor supraclavicular nodes. Thyroid not palpable. Trachea midline. Chest: Even excursion  Lungs: Grossly clear to auscultation bilaterally, no expiratory wheezes or rhonchi, no decreased tactile fremitus without inspiratory rales. Heart: Regular  rhythm; S1 > S2, no gallop ; Grade 2/6 systolic ejection murmur second right and left intercostal space as well as the apex. . No clicks, rub, palpable thrills   or heaves. PMI nondisplaced, 5th intercostal space MCL. Abdomen: Soft, nontender, nondistended,  mildly protuberant, no masses or organomegaly. Bowel sounds active. Extremities: Without clubbing, cyanosis or edema. Pulses present 3+ upper extermities bilaterally; present 1+ DP and present 1+ PT bilaterally. Data:   Scheduled Meds: Reviewed  Continuous Infusions:    sodium chloride 75 mL/hr at 07/17/22 1423    dextrose         Intake/Output Summary (Last 24 hours) at 7/18/2022 2245  Last data filed at 7/18/2022 1554  Gross per 24 hour   Intake 1235 ml   Output --   Net 1235 ml     CBC:   Recent Labs     07/16/22  1318 07/17/22  0536 07/18/22  0421   WBC 7.4 5.7 6.9   HGB 14.5 14.7 13.7   HCT 43.2 43.5 40.6    192 206     BMP:  Recent Labs     07/16/22  1318 07/17/22  0536 07/18/22  0421    143 144   K 3.3* 3.7 3.7   CL 96* 107 108*   CO2 27 22 23   BUN 11 6 13   CREATININE 0.6 0.4* 0.7   LABGLOM >60 >60 >60     ABGs:   Lab Results   Component Value Date/Time    PH 7.39 12/05/2011 06:10 PM     INR: No results for input(s): INR in the last 72 hours.   PRO-BNP:   Lab Results   Component Value Date    PROBNP 118 04/11/2017    PROBNP 184 04/22/2015      TSH:   Lab Results Component Value Date    TSH 7.990 (H) 04/23/2015      Cardiac Injury Profile:   Recent Labs     07/16/22  1318   TROPHS 20*      Lipid Profile: No results found for: TRIG, HDL, LDLCALC, CHOL   Hemoglobin A1C: No components found for: HGBA1C      RAD:   MRI BRAIN W WO CONTRAST   Final Result   1. No acute intracranial abnormality. 2.  Disproportionately prominent volume loss in the mesial temporal lobes, as   may be seen with Alzheimer's disease and other dementias. Clinical   correlation is needed. 3. No mass, mass effect, edema or hemorrhage. 4. Small air-fluid levels in the sphenoid sinuses. Clinical correlation for   possible acute sinusitis is recommended. RECOMMENDATIONS:   Unavailable         CT Head WO Contrast   Final Result   1. No acute intracranial hemorrhage or edema. 2.  Ethmoid and sphenoid sinusitis. EKG: See Report  Echo: See Report      IMPRESSIONS:  Patient Active Problem List   Diagnosis Code    Seizure-like activity [facility]) R56.9       RECOMMENDATIONS:  Mrs. Fariba Murray remains in atrial fibrillation but with a generally controlled response. We will probably convert within the next 72 hours. Hopefully the combination of a highly effective antiarrhythmic and anticoagulant will reduce any further morbidity. I have spent more than 25 minutes face to face with Caesar Long and reviewing notes and laboratory data, with greater than 50% of this time instructing and counseling the patient  face to face regarding my findings and recommendations and I have answered all questions as posed to me by Ms. Fariba Murray. Jyoti Gonzalez, DO FACP,FACC,[facility]      NOTE:  This report was transcribed using voice recognition software.   Every effort was made to ensure accuracy; however, inadvertent computerized transcription errors may be present

## 2022-07-19 NOTE — PROGRESS NOTES
[facility] Palm [facility]  [facility]  Progress Notes      CHIEF COMPLAINT: Abnormal body movements      HISTORY OF PRESENT ILLNESS:    22  Patient was seen on the floor earlier this morning at the main campus of [facility]  Son is at bedside  Data reviewed in detail  Spoke with the ER physician at the time of admission  I called her other son who is a neurosurgeon and got a better history  80-year-old  woman with advanced dementia  Lives at home with   Apparently was noted to have these abnormal extremity movements which the son described as myoclonic jerks but not seizures  She never had any oral injury or postictal status  Her CK is completely normal  Apparently these episodes are happening for the last 2 weeks since she took 1 dose of methylphenidate For hypersomnolence  Later in the day today she developed atrial fibrillation with rapid ventricular response  Cardiology is being consulted  Currently on prophylactic Keppra  MRI EEG pending  22  Patient was seen on the floor earlier this morning  Essentially unchanged  Remains in A. Fib  2022  Patient was seen on the floor earlier this morning  Registered nurse at bedside  Patient has difficult time following commands  Remains in A. fib    Past Medical History:    Past Medical History:   Diagnosis Date    Diabetes mellitus ([facility] [address]     DVT (deep venous thrombosis) ([facility] [address] 2011    left leg    Fall     recent, falls x3    Hypertension        Past Surgical History:    Past Surgical History:   Procedure Laterality Date     SECTION      x4    COLONOSCOPY      HIP SURGERY      right    KNEE SURGERY      left       Medications Prior to Admission:    Medications Prior to Admission: nitrofurantoin, macrocrystal-monohydrate, (MACROBID) 100 MG capsule, Take 100 mg by mouth in the morning and 100 mg before bedtime. methylphenidate (RITALIN) 5 MG tablet, Take 5 mg by mouth in the morning.   polycarbophil (FIBERCON) 625 MG tablet, Take 1 tablet by mouth daily (Patient not taking: Reported on 7/17/2022)  metFORMIN (GLUCOPHAGE) 500 MG tablet, Take 500 mg by mouth daily (with breakfast) (Patient not taking: Reported on 7/17/2022)  olmesartan (BENICAR) 20 MG tablet, Take 40 mg by mouth daily. (Patient not taking: Reported on 7/17/2022)    Allergies:    Sulfa antibiotics    Social History:    reports that she has never smoked. She has never used smokeless tobacco. She reports current alcohol use of about 1.0 standard drink per week. She reports that she does not use drugs. Family History:   family history is not on file. REVIEW OF SYSTEMS:  As above in the HPI, otherwise negative    PHYSICAL EXAM:    Vitals:  BP (!) 135/58   Pulse 78   Temp 96.8 °F (36 °C) (Temporal)   Resp 18   Ht 5' 5\" (1.651 m)   Wt 133 lb (60.3 kg)   SpO2 94%   BMI 22.13 kg/m²     General:  Awake, alert, she does not recall any events leading to hospitalization  Follows commands  HEENT:  Normocephalic, atraumatic. Pupils equal, round, reactive to light. No scleral icterus. No conjunctival injection. No nasal discharge. Neck:  Supple  Heart: Irregular not rapid  Lungs:  CTA bilaterally, bilat symmetrical expansion, no wheeze, rales, or rhonchi  Abdomen:   Bowel sounds present, soft, nontender, no masses, no organomegaly, no peritoneal signs  Extremities:  No clubbing, cyanosis, or edema  Skin:  Warm and dry, no open lesions or rash  Neuro:  Cranial nerves 2-12 intact, no focal deficits  Breast: deferred  Rectal: deferred  Genitalia:  deferred    LABS:  Data reviewed      ASSESSMENT:      Principal Problem:    Seizure-like activity ([facility] [address]  Likely myoclonic jerks  New onset of atrial fibrillation with rapid ventricular response  Advanced dementia      PLAN:  Spoke with her son who is a neurosurgeon 7/17  Discussed the diagnostic plans at length  Cardiology consult/input appreciated  Work-up in progress      Kayla Campa MD  12:57 PM  7/19/2022

## 2022-07-19 NOTE — CARE COORDINATION
Per previous Cm note, Patient's daughter Amaya Baldwin states she is the  but resides in [place]. Also the patients son, Chen Thornton, in [place] is a Neurosurgeon. He is coming into town this week. They will make a decision on skilled vs home based on EEG and MRI results. PT/OT Ampac scores 9/24 and 12/24. Patient remains on iv keppra bid. Per neurology note today, MRI brain showed disproportionate volume loss of the mesial temporal lobes. No acute findings. EEG shows a mild diffuse slowing. No epileptiform activity. Given her history of dementia, atrophy noted in the mesial temporal lobes and current infection- seizures are certainly a possibility as having dementia does increase the risk of seizure development. It is reasonable to continue Keppra at this time and follow up in the office in a couple weeks to see if any spells have recurred. Will reduce dose of Keppra to 500 mg BID (due to lethargy). I called and spoke to Amaya Baldwin who said the plan will be to bring her mother home with private duty aides and home health care. She does not have a preference for an agency. Anyone who is in network with her [place] Referral made to [facility] at [facility]. Home health care orders are in. Amaya Baldwin said she has a single bed with a rail for her mom as well as a FWW. All she needs at this time is a BSC. DME order for [facility] placed and referral made to Reese Montes at [facility] DME via confidential voicemail. Patient will need an ambulance transport home. There are 3 steps to enter the home with bed and bath on the main floor.   Dorita Chopra RN CM  [phone]

## 2022-07-20 LAB
METER GLUCOSE: 119 MG/DL (ref 74–99)
METER GLUCOSE: 131 MG/DL (ref 74–99)
METER GLUCOSE: 176 MG/DL (ref 74–99)
METER GLUCOSE: 202 MG/DL (ref 74–99)

## 2022-07-20 PROCEDURE — 6370000000 HC RX 637 (ALT 250 FOR IP): Performed by: INTERNAL MEDICINE

## 2022-07-20 PROCEDURE — 82962 GLUCOSE BLOOD TEST: CPT

## 2022-07-20 PROCEDURE — 1200000000 HC SEMI PRIVATE

## 2022-07-20 PROCEDURE — 2580000003 HC RX 258: Performed by: INTERNAL MEDICINE

## 2022-07-20 PROCEDURE — 6360000002 HC RX W HCPCS: Performed by: PHYSICIAN ASSISTANT

## 2022-07-20 RX ADMIN — APIXABAN 2.5 MG: 2.5 TABLET, FILM COATED ORAL at 10:45

## 2022-07-20 RX ADMIN — CALCIUM POLYCARBOPHIL 625 MG: 625 TABLET, FILM COATED ORAL at 10:45

## 2022-07-20 RX ADMIN — LEVETIRACETAM 500 MG: 5 INJECTION INTRAVENOUS at 11:03

## 2022-07-20 RX ADMIN — INSULIN LISPRO 1 UNITS: 100 INJECTION, SOLUTION INTRAVENOUS; SUBCUTANEOUS at 14:05

## 2022-07-20 RX ADMIN — LOSARTAN POTASSIUM 100 MG: 25 TABLET, FILM COATED ORAL at 10:45

## 2022-07-20 RX ADMIN — SODIUM CHLORIDE: 9 INJECTION, SOLUTION INTRAVENOUS at 11:25

## 2022-07-20 RX ADMIN — NYSTATIN: 100000 CREAM TOPICAL at 11:27

## 2022-07-20 RX ADMIN — AMIODARONE HYDROCHLORIDE 200 MG: 200 TABLET ORAL at 10:46

## 2022-07-20 RX ADMIN — METFORMIN HYDROCHLORIDE 500 MG: 500 TABLET ORAL at 10:46

## 2022-07-20 RX ADMIN — LEVETIRACETAM 500 MG: 5 INJECTION INTRAVENOUS at 23:33

## 2022-07-20 NOTE — PROGRESS NOTES
Kelley Fairbanks MD FACP  Internal medicine  Progress Notes      CHIEF COMPLAINT: Abnormal body movements      HISTORY OF PRESENT ILLNESS:    22  Patient was seen on the floor earlier this morning at the main campus of [facility]  Son is at bedside  Data reviewed in detail  Spoke with the ER physician at the time of admission  I called her other son who is a neurosurgeon and got a better history  25-year-old  woman with advanced dementia  Lives at home with   Apparently was noted to have these abnormal extremity movements which the son described as myoclonic jerks but not seizures  She never had any oral injury or postictal status  Her CK is completely normal  Apparently these episodes are happening for the last 2 weeks since she took 1 dose of methylphenidate For hypersomnolence  Later in the day today she developed atrial fibrillation with rapid ventricular response  Cardiology is being consulted  Currently on prophylactic Keppra  MRI EEG pending  22  Patient was seen on the floor earlier this morning  Essentially unchanged  Remains in A. Fib  2022  Patient was seen on the floor earlier this morning  Registered nurse at bedside  Patient has difficult time following commands  Remains in A.  Fib  22  Patient was seen on the floor earlier this morning  Remains confused  Not eating well  Data reviewed in detail  Neurology input appreciated    Past Medical History:    Past Medical History:   Diagnosis Date    Diabetes mellitus ([facility]     DVT (deep venous thrombosis) ([facility] [facility] 2011    left leg    Fall     recent, falls x3    Hypertension        Past Surgical History:    Past Surgical History:   Procedure Laterality Date     SECTION      x4    COLONOSCOPY      HIP SURGERY      right    KNEE SURGERY      left       Medications Prior to Admission:    Medications Prior to Admission: nitrofurantoin, macrocrystal-monohydrate, (MACROBID) 100 MG capsule, Take 100 mg by mouth in the the diagnostic plans at length  Cardiology consult/input appreciated  Work-up in progress      Manny Posey MD  2:50 PM  7/20/2022

## 2022-07-20 NOTE — CARE COORDINATION
Plan is home with private duty aides and [facility] when medically ready. Home health care orders are in. Neurology and cardiology are both following. Patient will need an ambulance transport home. There are 3 steps to enter the home with bed and bath on the main floor. Ambulance form in envelope in soft chart. Ann-Marie Andrade RN CM  [phone]      Received a call from patient's daughter Gregg Valentin who is now requesting a hospital bed. Referral made to [facility] at [facility] DME. She said patient will qualify for the bed with Dx of Covid-19 and Dementia. Will need DME order for bed prior to delivery to the home.   Ann-Marie Andrade RN CM  [phone]

## 2022-07-21 LAB
ALBUMIN SERPL-MCNC: 3.3 G/DL (ref 3.5–5.2)
ALP BLD-CCNC: 60 U/L (ref 35–104)
ALT SERPL-CCNC: 9 U/L (ref 0–32)
ANION GAP SERPL CALCULATED.3IONS-SCNC: 14 MMOL/L (ref 7–16)
AST SERPL-CCNC: 17 U/L (ref 0–31)
BASOPHILS ABSOLUTE: 0.02 E9/L (ref 0–0.2)
BASOPHILS RELATIVE PERCENT: 0.5 % (ref 0–2)
BILIRUB SERPL-MCNC: 1.2 MG/DL (ref 0–1.2)
BUN BLDV-MCNC: 10 MG/DL (ref 6–23)
CALCIUM SERPL-MCNC: 8.6 MG/DL (ref 8.6–10.2)
CHLORIDE BLD-SCNC: 110 MMOL/L (ref 98–107)
CO2: 21 MMOL/L (ref 22–29)
CREAT SERPL-MCNC: 0.7 MG/DL (ref 0.5–1)
EOSINOPHILS ABSOLUTE: 0.18 E9/L (ref 0.05–0.5)
EOSINOPHILS RELATIVE PERCENT: 4.1 % (ref 0–6)
GFR AFRICAN AMERICAN: >60
GFR NON-AFRICAN AMERICAN: >60 ML/MIN/1.73
GLUCOSE BLD-MCNC: 156 MG/DL (ref 74–99)
HCT VFR BLD CALC: 37 % (ref 34–48)
HEMOGLOBIN: 12.3 G/DL (ref 11.5–15.5)
IMMATURE GRANULOCYTES #: 0.01 E9/L
IMMATURE GRANULOCYTES %: 0.2 % (ref 0–5)
LYMPHOCYTES ABSOLUTE: 1.61 E9/L (ref 1.5–4)
LYMPHOCYTES RELATIVE PERCENT: 36.5 % (ref 20–42)
MCH RBC QN AUTO: 30.5 PG (ref 26–35)
MCHC RBC AUTO-ENTMCNC: 33.2 % (ref 32–34.5)
MCV RBC AUTO: 91.8 FL (ref 80–99.9)
METER GLUCOSE: 104 MG/DL (ref 74–99)
METER GLUCOSE: 130 MG/DL (ref 74–99)
METER GLUCOSE: 136 MG/DL (ref 74–99)
METER GLUCOSE: 140 MG/DL (ref 74–99)
METER GLUCOSE: 156 MG/DL (ref 74–99)
MONOCYTES ABSOLUTE: 0.38 E9/L (ref 0.1–0.95)
MONOCYTES RELATIVE PERCENT: 8.6 % (ref 2–12)
NEUTROPHILS ABSOLUTE: 2.21 E9/L (ref 1.8–7.3)
NEUTROPHILS RELATIVE PERCENT: 50.1 % (ref 43–80)
PDW BLD-RTO: 13.4 FL (ref 11.5–15)
PLATELET # BLD: 211 E9/L (ref 130–450)
PMV BLD AUTO: 9.7 FL (ref 7–12)
POTASSIUM SERPL-SCNC: 3.5 MMOL/L (ref 3.5–5)
RBC # BLD: 4.03 E12/L (ref 3.5–5.5)
SODIUM BLD-SCNC: 145 MMOL/L (ref 132–146)
TOTAL PROTEIN: 5.5 G/DL (ref 6.4–8.3)
WBC # BLD: 4.4 E9/L (ref 4.5–11.5)

## 2022-07-21 PROCEDURE — 6370000000 HC RX 637 (ALT 250 FOR IP): Performed by: INTERNAL MEDICINE

## 2022-07-21 PROCEDURE — 80053 COMPREHEN METABOLIC PANEL: CPT

## 2022-07-21 PROCEDURE — 82962 GLUCOSE BLOOD TEST: CPT

## 2022-07-21 PROCEDURE — 2580000003 HC RX 258: Performed by: INTERNAL MEDICINE

## 2022-07-21 PROCEDURE — 1200000000 HC SEMI PRIVATE

## 2022-07-21 PROCEDURE — 85025 COMPLETE CBC W/AUTO DIFF WBC: CPT

## 2022-07-21 PROCEDURE — 36415 COLL VENOUS BLD VENIPUNCTURE: CPT

## 2022-07-21 PROCEDURE — 6360000002 HC RX W HCPCS: Performed by: PHYSICIAN ASSISTANT

## 2022-07-21 PROCEDURE — 2500000003 HC RX 250 WO HCPCS: Performed by: INTERNAL MEDICINE

## 2022-07-21 RX ORDER — DOCUSATE SODIUM 50 MG/5ML
100 LIQUID ORAL DAILY
Status: DISCONTINUED | OUTPATIENT
Start: 2022-07-21 | End: 2022-07-23 | Stop reason: HOSPADM

## 2022-07-21 RX ADMIN — ANTI-FUNGAL POWDER MICONAZOLE NITRATE TALC FREE: 1.42 POWDER TOPICAL at 12:35

## 2022-07-21 RX ADMIN — LEVETIRACETAM 500 MG: 5 INJECTION INTRAVENOUS at 22:41

## 2022-07-21 RX ADMIN — APIXABAN 2.5 MG: 2.5 TABLET, FILM COATED ORAL at 22:42

## 2022-07-21 RX ADMIN — SODIUM CHLORIDE: 9 INJECTION, SOLUTION INTRAVENOUS at 08:02

## 2022-07-21 RX ADMIN — DOCUSATE SODIUM LIQUID 100 MG: 100 LIQUID ORAL at 17:58

## 2022-07-21 RX ADMIN — ANTI-FUNGAL POWDER MICONAZOLE NITRATE TALC FREE: 1.42 POWDER TOPICAL at 22:41

## 2022-07-21 RX ADMIN — LEVETIRACETAM 500 MG: 5 INJECTION INTRAVENOUS at 11:07

## 2022-07-21 RX ADMIN — INSULIN LISPRO 1 UNITS: 100 INJECTION, SOLUTION INTRAVENOUS; SUBCUTANEOUS at 13:55

## 2022-07-21 NOTE — FLOWSHEET NOTE
Inpatient Wound Care (Initial consult) 8402B    Admit Date: 7/16/2022 12:26 PM    Reason for consult:  Excoriation    Significant history: Per H&P    CHIEF COMPLAINT: Abnormal body movements        HISTORY OF PRESENT ILLNESS:    Patient was seen on the floor earlier this morning at the main campus of [facility]  Son is at bedside  Data reviewed in detail  Spoke with the ER physician at the time of admission  I called her other son who is a neurosurgeon and got a better history  61-year-old  woman with advanced dementia  Lives at home with   Apparently was noted to have these abnormal extremity movements which the son described as myoclonic jerks but not seizures  She never had any oral injury or postictal status  Her CK is completely normal  Apparently these episodes are happening for the last 2 weeks since she took 1 dose of methylphenidate For hypersomnolence  Later in the day today she developed atrial fibrillation with rapid ventricular response  Cardiology is being consulted  Currently on prophylactic Keppra  MRI EEG pending    Findings:     07/21/22 0940   Skin Integumentary    Skin Integrity Excoriation   Location breast folds   Skin Integrity Site 2   Skin Integrity Location 2   (redness blanchable)   Location 2 bilateral buttocks       Impression:  Skin assessment complete: see above documentation      Plan: Antifungal powder  TAPS  Heel protectors  Patient will need continued preventative care      Claudene Clayman, RN 7/21/2022 10:13 AM

## 2022-07-21 NOTE — PROGRESS NOTES
SPEECH/LANGUAGE PATHOLOGY  CLINICAL ASSESSMENT OF SWALLOWING FUNCTION   and PLAN OF CARE  PATIENT NAME:  Berta Garcia  (female)     MRN:  [id number]    :  5/10/1931  (80 y.o.)  STATUS:  Inpatient: Room 8402/8402-B    TODAY'S DATE:  2022  REFERRING PROVIDER:   Dr. Praful Marie: SLP swallowing-dysphagia evaluation and treatment Date of order:  22  REASON FOR REFERRAL: dysphagia   EVALUATING THERAPIST: OUSMANE Werner                 ASSESSMENT:    DYSPHAGIA DIAGNOSIS:   Clinical indicators of mild  oral phase dysphagia due to cognitive function      DIET RECOMMENDATIONS:  Easy to chew consistency solids (IDDSI level 7, transitional) with  thin liquids (IDDSI level 0)     FEEDING RECOMMENDATIONS:     Assistance level:  No assistance needed      Compensatory strategies recommended: No strategies are recommended at this time      Discussed recommendations with nursing?: No secondary to no diet/liquid change recommended     SPEECH THERAPY  PLAN OF CARE   The dysphagia POC is established based on physician order, dysphagia diagnosis and results of clinical assessment     Dysphagia therapy is not recommended     Conditions Requiring Skilled Therapeutic Intervention for dysphagia:    not applicable    Specific dysphagia interventions to include:     Not applicable    Specific instructions for next treatment:  not applicable   Patient Treatment Goals:    Short Term Goals:  Not applicable no therapy warranted     Long Term Goals:   Not applicable no therapy warranted      Patient/family Goal:    not applicable                    ADMITTING DIAGNOSIS: Seizure[facility] activity ([facility] [R56.9]    VISIT DIAGNOSIS:      PATIENT REPORT/COMPLAINT: denies difficulty swallowing  nursing not available at time of evaluation chart reviewed and patient is not NPO for any procedures per current documentation.      PRIOR LEVEL OF SWALLOW FUNCTION:    PAST HISTORY OF DYSPHAGIA?: none reported    Home diet: Regular consistency solids (IDDSI level 7) with  thin liquids (IDDSI level 0)    Current Diet Order:  ADULT DIET; Regular    PROCEDURE:  Consistencies Administered During the Evaluation   Liquids: thin liquid   Solids:  pureed foods and soft solid foods      Method of Intake:   cup, straw, spoon  Self fed      Position:   Seated, upright    CLINICAL ASSESSMENT  Oral Stage: The oral stage of swallowing was within functional limits      Pharyngeal Stage:    No signs of aspiration were noted during this evaluation however, silent aspiration cannot be ruled out at bedside. If silent aspiration is suspected, a Videofluoroscopic Study of Swallowing (MBS) is recommended and requires a physician order. Cognition:   Confusion noted    Oral Peripheral Examination   Adequate lingual/labial strength     Current Respiratory Status    room air     Volitional Swallow: Present    Volitional Cough:    Present    Pain: No pain reported. EDUCATION:   The Speech Language Pathologist (SLP) completed education regarding results of evaluation and that intervention is not warranted at this time. Learner: Patient  Education:  Reviewed results and recommendations of this evaluation  Evaluation of Education: No evidence of learning    This plan will be re-evaluated and revised in 1 week  if warranted. CPT code:  94429  bedside swallow eval      [x]The admitting diagnosis and active problem list, have been reviewed prior to initiation of this evaluation.         ACTIVE PROBLEM LIST:   Patient Active Problem List   Diagnosis    Seizure-like activity ([facility]

## 2022-07-21 NOTE — PROGRESS NOTES
Kenny Barnard MD FACP  Internal medicine  Progress Notes      CHIEF COMPLAINT: Abnormal body movements      HISTORY OF PRESENT ILLNESS:    22  Patient was seen on the floor earlier this morning at the main campus of [facility]  Son is at bedside  Data reviewed in detail  Spoke with the ER physician at the time of admission  I called her other son who is a neurosurgeon and got a better history  66-year-old  woman with advanced dementia  Lives at home with   Apparently was noted to have these abnormal extremity movements which the son described as myoclonic jerks but not seizures  She never had any oral injury or postictal status  Her CK is completely normal  Apparently these episodes are happening for the last 2 weeks since she took 1 dose of methylphenidate For hypersomnolence  Later in the day today she developed atrial fibrillation with rapid ventricular response  Cardiology is being consulted  Currently on prophylactic Keppra  MRI EEG pending  22  Patient was seen on the floor earlier this morning  Essentially unchanged  Remains in A. Fib  2022  Patient was seen on the floor earlier this morning  Registered nurse at bedside  Patient has difficult time following commands  Remains in A.  Fib  22  Patient was seen on the floor earlier this morning  Remains confused  Not eating well  Data reviewed in detail  Neurology input appreciated  22 patient was seen on the floor earlier this morning  Converted to sinus rhythm with PACs  Daughter at bedside  Oral intake poor  Lethargic    Past Medical History:    Past Medical History:   Diagnosis Date    Diabetes mellitus ([address]     DVT (deep venous thrombosis) ([address] 2011    left leg    Fall     recent, falls x3    Hypertension        Past Surgical History:    Past Surgical History:   Procedure Laterality Date     SECTION      x4    COLONOSCOPY      HIP SURGERY      right    KNEE SURGERY      left       Medications Prior to Admission:    Medications Prior to Admission: nitrofurantoin, macrocrystal-monohydrate, (MACROBID) 100 MG capsule, Take 100 mg by mouth in the morning and 100 mg before bedtime. methylphenidate (RITALIN) 5 MG tablet, Take 5 mg by mouth in the morning. polycarbophil (FIBERCON) 625 MG tablet, Take 1 tablet by mouth daily (Patient not taking: Reported on 7/17/2022)  metFORMIN (GLUCOPHAGE) 500 MG tablet, Take 500 mg by mouth daily (with breakfast) (Patient not taking: Reported on 7/17/2022)  olmesartan (BENICAR) 20 MG tablet, Take 40 mg by mouth daily. (Patient not taking: Reported on 7/17/2022)    Allergies:    Sulfa antibiotics    Social History:    reports that she has never smoked. She has never used smokeless tobacco. She reports current alcohol use of about 1.0 standard drink per week. She reports that she does not use drugs. Family History:   family history is not on file. REVIEW OF SYSTEMS:  As above in the HPI, otherwise negative    PHYSICAL EXAM:    Vitals:  /76   Pulse 65   Temp (!) 44.6 °F (7 °C)   Resp 18   Ht 5' 5\" (1.651 m)   Wt 133 lb (60.3 kg)   SpO2 92%   BMI 22.13 kg/m²     General:  Awake, alert, she does not recall any events leading to hospitalization  Follows commands  HEENT:  Normocephalic, atraumatic. Pupils equal, round, reactive to light. No scleral icterus. No conjunctival injection. No nasal discharge. Neck:  Supple  Heart: Irregular not rapid  Lungs:  CTA bilaterally, bilat symmetrical expansion, no wheeze, rales, or rhonchi  Abdomen:   Bowel sounds present, soft, nontender, no masses, no organomegaly, no peritoneal signs  Extremities:  No clubbing, cyanosis, or edema  Skin:  Warm and dry, no open lesions or rash  Neuro:  Cranial nerves 2-12 intact, no focal deficits  Breast: deferred  Rectal: deferred  Genitalia:  deferred    LABS:  Data reviewed      ASSESSMENT:      Principal Problem:    Seizure-like activity ([address]  Likely myoclonic jerks  New onset of atrial fibrillation with rapid ventricular response/converted to sinus rhythm  Advanced dementia      PLAN:  Currently on Keppra  Discussed with the daughter  Currently in sinus rhythm  Spoke with her son who is a neurosurgeon 7/17  Discussed the diagnostic plans at length  Cardiology consult/input appreciated  Work-up in progress      Liya Webster MD  4:20 PM  7/21/2022

## 2022-07-21 NOTE — CARE COORDINATION
Plan is home with private duty aides and [facility] when medically ready. Home health care orders are in. Neurology and cardiology are both following. ST swallow-dysphagia evaluation pending. DME order for hospital bed is in. Palomo from [address] DME notified. Patient will need an ambulance transport home. There are 3 steps to enter the home with bed and bath on the main floor. Ambulance form in envelope in soft chart.   Meaghan Worrell RN CM  [phone]

## 2022-07-22 LAB
METER GLUCOSE: 112 MG/DL (ref 74–99)
METER GLUCOSE: 112 MG/DL (ref 74–99)
METER GLUCOSE: 126 MG/DL (ref 74–99)
METER GLUCOSE: 137 MG/DL (ref 74–99)

## 2022-07-22 PROCEDURE — 6360000002 HC RX W HCPCS: Performed by: PHYSICIAN ASSISTANT

## 2022-07-22 PROCEDURE — 82962 GLUCOSE BLOOD TEST: CPT

## 2022-07-22 PROCEDURE — 6370000000 HC RX 637 (ALT 250 FOR IP): Performed by: INTERNAL MEDICINE

## 2022-07-22 PROCEDURE — 1200000000 HC SEMI PRIVATE

## 2022-07-22 RX ORDER — AMIODARONE HYDROCHLORIDE 200 MG/1
100 TABLET ORAL DAILY
Status: DISCONTINUED | OUTPATIENT
Start: 2022-07-23 | End: 2022-07-23 | Stop reason: HOSPADM

## 2022-07-22 RX ADMIN — APIXABAN 2.5 MG: 2.5 TABLET, FILM COATED ORAL at 21:28

## 2022-07-22 RX ADMIN — ANTI-FUNGAL POWDER MICONAZOLE NITRATE TALC FREE: 1.42 POWDER TOPICAL at 10:38

## 2022-07-22 RX ADMIN — LEVETIRACETAM 500 MG: 5 INJECTION INTRAVENOUS at 21:30

## 2022-07-22 RX ADMIN — LEVETIRACETAM 500 MG: 5 INJECTION INTRAVENOUS at 10:40

## 2022-07-22 RX ADMIN — ANTI-FUNGAL POWDER MICONAZOLE NITRATE TALC FREE: 1.42 POWDER TOPICAL at 21:28

## 2022-07-22 ASSESSMENT — PAIN SCALES - PAIN ASSESSMENT IN ADVANCED DEMENTIA (PAINAD)
TOTALSCORE: 0
NEGVOCALIZATION: 0
CONSOLABILITY: 0
TOTALSCORE: 0
BODYLANGUAGE: 0
BODYLANGUAGE: 0
FACIALEXPRESSION: 0
CONSOLABILITY: 0
FACIALEXPRESSION: 0
BREATHING: 0
NEGVOCALIZATION: 0
BREATHING: 0

## 2022-07-22 ASSESSMENT — PAIN SCALES - GENERAL
PAINLEVEL_OUTOF10: 0

## 2022-07-22 NOTE — CARE COORDINATION
Plan is home with private duty aides and [facility] when medically ready. Home health care orders are in. Neurology and cardiology are both following. ST swallow-dysphagia evaluation completed. Clinical indicators of mild  oral phase dysphagia due to cognitive function. DIET RECOMMENDATIONS:  Easy to chew consistency solids (IDDSI level 7, transitional) with thin liquids. DME order for hospital bed is in. Palomo from [facility] DME aware and updated. Patient will need an ambulance transport home. There are 3 steps to enter the home with bed and bath on the main floor. Ambulance form in envelope in soft chart.   Chemo Zurita RN CM  [phone]

## 2022-07-22 NOTE — PROGRESS NOTES
PROGRESS NOTE       PATIENT PROBLEM LIST:  Patient Active Problem List   Diagnosis Code    Seizure-like activity ([facility] R56.9       SUBJECTIVE:  Alena Rodriguez again has no voiced complaints this evening. REVIEW OF SYSTEMS:  General ROS: negative for - fatigue, malaise,  weight gain or weight loss  Psychological ROS: negative for - anxiety , depression; positive for - decreased cognitive abilities  Ophthalmic ROS: negative for - decreased vision or visual distortion. ENT ROS: negative  Allergy and Immunology ROS: negative  Hematological and Lymphatic ROS: negative  Endocrine: no heat or cold intolerance and no polyphagia, polydipsia, or polyuria  Respiratory ROS: negative for - hemoptysis, pleuritic pain, and wheezing  Cardiovascular ROS: positive for - irregular heartbeat and rapid heart rate and heart murmur. Gastrointestinal ROS: no abdominal pain, change in bowel habits, or black or bloody stools  Genito-Urinary ROS: no nocturia, dysuria, trouble voiding, frequency or hematuria  Musculoskeletal ROS: negative for- myalgias, arthralgias, or claudication  Neurological ROS: no TIA or stroke symptoms otherwise no significant change in symptoms or problems since yesterday as documented in previous progress notes. SCHEDULED MEDICATIONS:   miconazole   Topical BID    docusate sodium  100 mg Oral Daily    levETIRAcetam  500 mg IntraVENous Q12H    amiodarone  200 mg Oral Daily    apixaban  2.5 mg Oral BID    metFORMIN  500 mg Oral Daily with breakfast    losartan  100 mg Oral Daily    insulin lispro  0-12 Units SubCUTAneous TID WC    insulin lispro  0-6 Units SubCUTAneous Nightly       VITAL SIGNS:                                                                                                                          /76   Pulse 65   Temp (!) 44.6 °F (7 °C)   Resp 18   Ht 5' 5\" (1.651 m)   Wt 133 lb (60.3 kg)   SpO2 92%   BMI 22.13 kg/m²   No data found.     OBJECTIVE:    HEENT: PERRL, EOM  Intact; sclera non-icteric, conjunctiva pink. Carotids are brisk in upstroke with normal contour. No carotid bruits. Normal jugular venous pulsation at 45°. No palpable cervical nor supraclavicular nodes. Thyroid not palpable. Trachea midline. Tongue appears  dry. Chest: Even excursion  Lungs: Grossly clear to auscultation bilaterally, no expiratory wheezes or rhonchi, no decreased tactile fremitus without inspiratory rales. Heart: Regular  rhythm; S1 > S2, no gallop ; Grade 2/6 systolic ejection murmur second right and left intercostal space as well as the apex. . No clicks, rub, palpable thrills   or heaves. PMI nondisplaced, 5th intercostal space MCL. Abdomen: Soft, nontender, nondistended,  mildly protuberant, no masses or organomegaly. Bowel sounds active. Extremities: Without clubbing, cyanosis or edema. Pulses present 3+ upper extermities bilaterally; present 1+ DP and present 1+ PT bilaterally. Data:   Scheduled Meds: Reviewed  Continuous Infusions:    sodium chloride 75 mL/hr at 07/21/22 0802    dextrose         Intake/Output Summary (Last 24 hours) at 7/18/2022 2245  Last data filed at 7/18/2022 1554  Gross per 24 hour   Intake 1235 ml   Output --   Net 1235 ml     CBC:   Recent Labs     07/19/22  0431 07/21/22  0426   WBC 5.8 4.4*   HGB 14.1 12.3   HCT 43.1 37.0    211     BMP:  Recent Labs     07/19/22  0431      K 4.3      CO2 24   BUN 14   CREATININE 1.0   LABGLOM 52     ABGs:   Lab Results   Component Value Date/Time    PH 7.39 12/05/2011 06:10 PM     INR: No results for input(s): INR in the last 72 hours. PRO-BNP:   Lab Results   Component Value Date    PROBNP 118 04/11/2017    PROBNP 184 04/22/2015      TSH:   Lab Results   Component Value Date    TSH 7.990 (H) 04/23/2015      Cardiac Injury Profile:   No results for input(s): TROPHS in the last 72 hours.      Lipid Profile: No results found for: TRIG, HDL, LDLCALC, CHOL   Hemoglobin A1C: No components found for: HGBA1C      RAD: MRI BRAIN W WO CONTRAST   Final Result   1. No acute intracranial abnormality. 2.  Disproportionately prominent volume loss in the mesial temporal lobes, as   may be seen with Alzheimer's disease and other dementias. Clinical   correlation is needed. 3. No mass, mass effect, edema or hemorrhage. 4. Small air-fluid levels in the sphenoid sinuses. Clinical correlation for   possible acute sinusitis is recommended. RECOMMENDATIONS:   Unavailable         CT Head WO Contrast   Final Result   1. No acute intracranial hemorrhage or edema. 2.  Ethmoid and sphenoid sinusitis. EKG: See Report  Echo: See Report      IMPRESSIONS:  Patient Active Problem List   Diagnosis Code    Seizure-like activity [facility]) R56.9       RECOMMENDATIONS:  Mrs. Sun Villalpando, once again remains in atrial fibrillation but with a well controlled Ventricular response. We will probably convert within the next 72 hours. Hopefully the combination of amiodarone and anticoagulant will reduce any further morbidity. Lower dosage of amiodarone in 2 weeks post-initiation. to 100 mg daily. Hopefully she will return to sinus rhythm at that point we will reduce her medications. I have spent more than 25 minutes face to face with Luna Holland and reviewing notes and laboratory data, with greater than 50% of this time instructing and counseling the patient  face to face regarding my findings and recommendations and I have answered all questions as posed to me by Ms. Sun Villalpando. Lamin Tariq,  FACP,FACC,[facility]      NOTE:  This report was transcribed using voice recognition software.   Every effort was made to ensure accuracy; however, inadvertent computerized transcription errors may be present

## 2022-07-22 NOTE — PROGRESS NOTES
PROGRESS NOTE       PATIENT PROBLEM LIST:  Patient Active Problem List   Diagnosis Code    Seizure-like activity ([facility] R56.9       SUBJECTIVE:  Rosann Cabot again has no voiced complaints presently but is not well oriented to time place and person. REVIEW OF SYSTEMS:  General ROS: negative for - fatigue, malaise,  weight gain or weight loss  Psychological ROS: negative for - anxiety , depression; positive for - decreased cognitive abilities  Ophthalmic ROS: negative for - decreased vision or visual distortion. ENT ROS: negative  Allergy and Immunology ROS: negative  Hematological and Lymphatic ROS: negative  Endocrine: no heat or cold intolerance and no polyphagia, polydipsia, or polyuria  Respiratory ROS: negative for - hemoptysis, pleuritic pain, and wheezing  Cardiovascular ROS: positive for - irregular heartbeat and rapid heart rate and heart murmur. Gastrointestinal ROS: no abdominal pain, change in bowel habits, or black or bloody stools  Genito-Urinary ROS: no nocturia, dysuria, trouble voiding, frequency or hematuria  Musculoskeletal ROS: negative for- myalgias, arthralgias, or claudication  Neurological ROS: no TIA or stroke symptoms otherwise no significant change in symptoms or problems since yesterday as documented in previous progress notes.     SCHEDULED MEDICATIONS:   [START ON 7/23/2022] amiodarone  100 mg Oral Daily    miconazole   Topical BID    docusate sodium  100 mg Oral Daily    levETIRAcetam  500 mg IntraVENous Q12H    apixaban  2.5 mg Oral BID    metFORMIN  500 mg Oral Daily with breakfast    losartan  100 mg Oral Daily    insulin lispro  0-12 Units SubCUTAneous TID WC    insulin lispro  0-6 Units SubCUTAneous Nightly       VITAL SIGNS:                                                                                                                          BP (!) 166/72   Pulse 64   Temp 97.8 °F (36.6 °C) (Oral)   Resp 16   Ht 5' 5\" (1.651 m)   Wt 133 lb (60.3 kg)   SpO2 97% BMI 22.13 kg/m²   No data found. OBJECTIVE:    HEENT: PERRL, EOM  Intact; sclera non-icteric, conjunctiva pink. Carotids are brisk in upstroke with normal contour. No carotid bruits. Normal jugular venous pulsation at 45°. No palpable cervical nor supraclavicular nodes. Thyroid not palpable. Trachea midline. Mouth appears very dry. Chest: Even excursion  Lungs: Grossly clear to auscultation bilaterally, no expiratory wheezes or rhonchi, no decreased tactile fremitus without inspiratory rales. Heart: Regular  rhythm; S1 > S2, no gallop ; Grade 2/6 systolic ejection murmur second right and left intercostal space as well as the apex. . No clicks, rub, palpable thrills   or heaves. PMI nondisplaced, 5th intercostal space MCL. Abdomen: Soft, nontender, nondistended,  mildly protuberant, no masses or organomegaly. Bowel sounds active. Extremities: Without clubbing, cyanosis or edema. Pulses present 3+ upper extermities bilaterally; present 1+ DP and present 1+ PT bilaterally. Data:   Scheduled Meds: Reviewed  Continuous Infusions:    sodium chloride 75 mL/hr at 07/21/22 0802    dextrose         Intake/Output Summary (Last 24 hours) at 7/18/2022 2245  Last data filed at 7/18/2022 1554  Gross per 24 hour   Intake 1235 ml   Output --   Net 1235 ml     CBC:   Recent Labs     07/21/22  0426   WBC 4.4*   HGB 12.3   HCT 37.0        BMP:  Recent Labs     07/21/22  0426      K 3.5   *   CO2 21*   BUN 10   CREATININE 0.7   LABGLOM >60     ABGs:   Lab Results   Component Value Date/Time    PH 7.39 12/05/2011 06:10 PM     INR: No results for input(s): INR in the last 72 hours. PRO-BNP:   Lab Results   Component Value Date    PROBNP 118 04/11/2017    PROBNP 184 04/22/2015      TSH:   Lab Results   Component Value Date    TSH 7.990 (H) 04/23/2015      Cardiac Injury Profile:   No results for input(s): TROPHS in the last 72 hours.      Lipid Profile: No results found for: TRIG, HDL, LDLCALC, CHOL Hemoglobin A1C: No components found for: HGBA1C      RAD:   MRI BRAIN W WO CONTRAST   Final Result   1. No acute intracranial abnormality. 2.  Disproportionately prominent volume loss in the mesial temporal lobes, as   may be seen with Alzheimer's disease and other dementias. Clinical   correlation is needed. 3. No mass, mass effect, edema or hemorrhage. 4. Small air-fluid levels in the sphenoid sinuses. Clinical correlation for   possible acute sinusitis is recommended. RECOMMENDATIONS:   Unavailable         CT Head WO Contrast   Final Result   1. No acute intracranial hemorrhage or edema. 2.  Ethmoid and sphenoid sinusitis. EKG: See Report  Echo: See Report      IMPRESSIONS:  Patient Active Problem List   Diagnosis Code    Seizure-like activity [facility]) R56.9       RECOMMENDATIONS:  Mrs. Kodi Monsivais still remains in atrial fibrillation but with a generally controlled response. We will probably convert within the next 72 hours. Hopefully the combination of a highly effective antiarrhythmic and anticoagulant will reduce any further morbidity. I have spent more than 26 minutes face to face with Alena Rodriguez and reviewing notes and laboratory data, with greater than 50% of this time instructing and counseling the patient  face to face regarding my findings and recommendations and I have answered all questions as posed to me by Ms. Kodi Monsivais. Sarahi Morgan, DO FACP,FACC,FSCAI      NOTE:  This report was transcribed using voice recognition software.   Every effort was made to ensure accuracy; however, inadvertent computerized transcription errors may be present

## 2022-07-22 NOTE — PROGRESS NOTES
[facility]  [facility]  Progress Notes      CHIEF COMPLAINT: Abnormal body movements      HISTORY OF PRESENT ILLNESS:    22  Patient was seen on the floor earlier this morning at the main [facility] of [facility]  Son is at bedside  Data reviewed in detail  Spoke with the ER physician at the time of admission  I called her other son who is a neurosurgeon and got a better history  80-year-old  woman with advanced dementia  Lives at home with   Apparently was noted to have these abnormal extremity movements which the son described as myoclonic jerks but not seizures  She never had any oral injury or postictal status  Her CK is completely normal  Apparently these episodes are happening for the last 2 weeks since she took 1 dose of methylphenidate For hypersomnolence  Later in the day today she developed atrial fibrillation with rapid ventricular response  Cardiology is being consulted  Currently on prophylactic Keppra  MRI EEG pending  22  Patient was seen on the floor earlier this morning  Essentially unchanged  Remains in A. Fib  2022  Patient was seen on the floor earlier this morning  Registered nurse at bedside  Patient has difficult time following commands  Remains in A.  Fib  22  Patient was seen on the floor earlier this morning  Remains confused  Not eating well  Data reviewed in detail  Neurology input appreciated  22 patient was seen on the floor earlier this morning  Converted to sinus rhythm with PACs  Daughter at bedside  Oral intake poor  Lethargic  22  Patient was seen on the floor earlier this morning  Following commands today    Past Medical History:    Past Medical History:   Diagnosis Date    Diabetes mellitus ([address]     DVT (deep venous thrombosis) ([address] 2011    left leg    Fall     recent, falls x3    Hypertension        Past Surgical History:    Past Surgical History:   Procedure Laterality Date     SECTION      x4 COLONOSCOPY      HIP SURGERY      right    KNEE SURGERY      left       Medications Prior to Admission:    Medications Prior to Admission: nitrofurantoin, macrocrystal-monohydrate, (MACROBID) 100 MG capsule, Take 100 mg by mouth in the morning and 100 mg before bedtime. methylphenidate (RITALIN) 5 MG tablet, Take 5 mg by mouth in the morning. polycarbophil (FIBERCON) 625 MG tablet, Take 1 tablet by mouth daily (Patient not taking: Reported on 7/17/2022)  metFORMIN (GLUCOPHAGE) 500 MG tablet, Take 500 mg by mouth daily (with breakfast) (Patient not taking: Reported on 7/17/2022)  olmesartan (BENICAR) 20 MG tablet, Take 40 mg by mouth daily. (Patient not taking: Reported on 7/17/2022)    Allergies:    Sulfa antibiotics    Social History:    reports that she has never smoked. She has never used smokeless tobacco. She reports current alcohol use of about 1.0 standard drink per week. She reports that she does not use drugs. Family History:   family history is not on file. REVIEW OF SYSTEMS:  As above in the HPI, otherwise negative    PHYSICAL EXAM:    Vitals:  BP (!) 166/72   Pulse 64   Temp 97.8 °F (36.6 °C) (Oral)   Resp 16   Ht 5' 5\" (1.651 m)   Wt 133 lb (60.3 kg)   SpO2 97%   BMI 22.13 kg/m²     General:  Awake, alert, she does not recall any events leading to hospitalization  Follows commands  HEENT:  Normocephalic, atraumatic. Pupils equal, round, reactive to light. No scleral icterus. No conjunctival injection. No nasal discharge. Neck:  Supple  Heart: Irregular not rapid  Lungs:  CTA bilaterally, bilat symmetrical expansion, no wheeze, rales, or rhonchi  Abdomen:   Bowel sounds present, soft, nontender, no masses, no organomegaly, no peritoneal signs  Extremities:  No clubbing, cyanosis, or edema  Skin:  Warm and dry, no open lesions or rash  Neuro:  Cranial nerves 2-12 intact, no focal deficits  Breast: deferred  Rectal: deferred  Genitalia:  deferred    LABS:  Data reviewed      ASSESSMENT:      Principal Problem:    Seizure-like activity (HCC)  Likely myoclonic jerks  New onset of atrial fibrillation with rapid ventricular response/converted to sinus rhythm  Advanced dementia      PLAN  Spoke with son who is a neurosurgeon over the telephone earlier today  Currently on Keppra  Discussed with the daughter 7/21  Currently in sinus rhythm  Spoke with her son who is a neurosurgeon 7/17  Likely home tomorrow with home health care        Laurie Montanez MD  1:56 PM  7/22/2022

## 2022-07-22 NOTE — PROGRESS NOTES
PROGRESS NOTE       PATIENT PROBLEM LIST:  Patient Active Problem List   Diagnosis Code    [facility]       SUBJECTIVE:  [place] is resting quietly without voiced complaints but is not well oriented to time place or person. REVIEW OF SYSTEMS:  Unable to obtain accurate review of systems secondary to patient's depressed cognitive status. SCHEDULED MEDICATIONS:   [START ON 7/23/2022] amiodarone  100 mg Oral Daily    miconazole   Topical BID    docusate sodium  100 mg Oral Daily    levETIRAcetam  500 mg IntraVENous Q12H    apixaban  2.5 mg Oral BID    metFORMIN  500 mg Oral Daily with breakfast    losartan  100 mg Oral Daily    insulin lispro  0-12 Units SubCUTAneous TID WC    insulin lispro  0-6 Units SubCUTAneous Nightly       VITAL SIGNS:                                                                                                                          BP (!) 166/72   Pulse 64   Temp 97.8 °F (36.6 °C) (Oral)   Resp 16   Ht 5' 5\" (1.651 m)   Wt 133 lb (60.3 kg)   SpO2 97%   BMI 22.13 kg/m²   No data found. OBJECTIVE:    HEENT: PERRL, EOM  Intact; sclera non-icteric, conjunctiva pink. Carotids are brisk in upstroke with normal contour. No carotid bruits. Normal jugular venous pulsation at 45°. No palpable cervical nor supraclavicular nodes. Thyroid not palpable. Trachea midline. Mouth appears very dry. Chest: Even excursion  Lungs: Grossly clear to auscultation bilaterally, no expiratory wheezes or rhonchi, no decreased tactile fremitus without inspiratory rales. Heart: Regular  rhythm; S1 > S2, no gallop ; Grade 2/6 systolic ejection murmur second right and left intercostal space as well as the apex. . No clicks, rub, palpable thrills   or heaves. PMI nondisplaced, 5th intercostal space MCL. Abdomen: Soft, nontender, nondistended,  mildly protuberant, no masses or organomegaly. Bowel sounds active. Extremities: Without clubbing, cyanosis or edema.  Pulses present 3+ upper extermities bilaterally; present 1+ DP and present 1+ PT bilaterally. Data:   Scheduled Meds: Reviewed  Continuous Infusions:    sodium chloride 75 mL/hr at 07/21/22 0802    dextrose         Intake/Output Summary (Last 24 hours) at 7/18/2022 2245  Last data filed at 7/18/2022 1554  Gross per 24 hour   Intake 1235 ml   Output --   Net 1235 ml     CBC:   Recent Labs     07/21/22  0426   WBC 4.4*   HGB 12.3   HCT 37.0        BMP:  Recent Labs     07/21/22  0426      K 3.5   *   CO2 21*   BUN 10   CREATININE 0.7   LABGLOM >60     ABGs:   Lab Results   Component Value Date/Time    PH 7.39 12/05/2011 06:10 PM     INR: No results for input(s): INR in the last 72 hours. PRO-BNP:   Lab Results   Component Value Date    PROBNP 118 04/11/2017    PROBNP 184 04/22/2015      TSH:   Lab Results   Component Value Date    TSH 7.990 (H) 04/23/2015      Cardiac Injury Profile:   No results for input(s): TROPHS in the last 72 hours. Lipid Profile: No results found for: TRIG, HDL, LDLCALC, CHOL   Hemoglobin A1C: No components found for: HGBA1C      RAD:   MRI BRAIN W WO CONTRAST   Final Result   1. No acute intracranial abnormality. 2.  Disproportionately prominent volume loss in the mesial temporal lobes, as   may be seen with Alzheimer's disease and other dementias. Clinical   correlation is needed. 3. No mass, mass effect, edema or hemorrhage. 4. Small air-fluid levels in the sphenoid sinuses. Clinical correlation for   possible acute sinusitis is recommended. RECOMMENDATIONS:   Unavailable         CT Head WO Contrast   Final Result   1. No acute intracranial hemorrhage or edema. 2.  Ethmoid and sphenoid sinusitis. EKG: See Report  Echo: See Report      IMPRESSIONS:  Patient Active Problem List   Diagnosis Code    Seizure-like activity [facility]) R56.9       RECOMMENDATIONS:  Mrs. Lisa Hays has converted to sinus rhythm with premature supraventricular ectopy.   We will now decrease dosage of amiodarone to 100 mg daily with the hope that she remains in sinus rhythm and continue appropriate anticoagulation. Her global clinical status is not significantly improved however. I have spent more than 26 minutes face to face with Dorenegeorgette Sender and reviewing notes and laboratory data, with greater than 50% of this time instructing and counseling the patient  face to face regarding my findings and recommendations and I have answered all questions as posed to me by Ms. Wilbert Hwang. Steve Murillo, DO FACP,FACC,[facility]      NOTE:  This report was transcribed using voice recognition software.   Every effort was made to ensure accuracy; however, inadvertent computerized transcription errors may be present

## 2022-07-22 NOTE — PROGRESS NOTES
Comprehensive Nutrition Assessment    Type and Reason for Visit:  Initial, RD Nutrition Re-Screen/LOS    Nutrition Recommendations/Plan:   Continue Diet per SLP. Will Start ONS and monitor. If minimal intake remains x next ~1-2d, would then highly rec for EN support. If needed;  TF Recommendations:  Diabetic (Glucerna1.5) @ 40ml/hr (Goal) Continuous x 24hr/d= 960ml TV, 1440kcal, 79gm Pro, 729ml free water. 120ml flush q 6h= 1209ml total water w/ flush. Pt at high risk for Re-Feeding Syndrome d/t minimal intake x >7 days - Highly recommend to start at half of goal rate x first 24hrs and increase slowly as tolerated to goal w/ close monitoring of BGL/Lytes Labs both prior to and during initiation of nutrition support; correct PRN. Malnutrition Assessment:  Malnutrition Status:  Insufficient data (07/22/22 1352)    Context:  Chronic Illness     Findings of the 6 clinical characteristics of malnutrition:  Energy Intake:  75% or less estimated energy requirements for 1 month or longer  Weight Loss:  Unable to assess (2/2 poor EMR wt hx pta)     Body Fat Loss:  Unable to assess (2/2 COVID19 Iso)     Muscle Mass Loss:  Unable to assess    Fluid Accumulation:  No significant fluid accumulation     Strength:  Not Performed    Nutrition Assessment:    Pt adm w/ Sz like episodes x ~2wks pta. PMHx DM, HTN, Dementia. Adm w/ Sz-Activity, BTOAH25+, +mild dysphagia per BSE 7/21. Pt at nutritional risk d/t ongoing minimal demetra/intake x ~6d now since adm 2/2 AMS/Dementia. Will Start ONS, provide EN rec's and monitor. Nutrition Related Findings:     AMSx3, dentition WNL, Abd/BS WDL, no edema, +I/O's Wound Type: None       Current Nutrition Intake & Therapies:    Average Meal Intake: 0%, 1-25%, Refusing to eat  Average Supplements Intake: None Ordered  ADULT DIET; Easy to Chew    Anthropometric Measures:  Height: 5' 5\" (165.1 cm)  Ideal Body Weight (IBW): 125 lbs (57 kg)    Admission Body Weight: 133 lb (60.3 kg) (unknown method 7/16)  Current Body Weight: 133 lb (60.3 kg) (unknown method 7/16),   IBW. Weight Source: Not Specified  Current BMI (kg/m2): 22.1  Usual Body Weight:  (UTO UBW 2/2 poor EMR wt hx pta)                       BMI Categories: Normal Weight (BMI 22.0 to 24.9) age over 72    Estimated Daily Nutrient Needs:  Energy Requirements Based On: Formula  Weight Used for Energy Requirements: Current  Energy (kcal/day):   Weight Used for Protein Requirements: Current  Protein (g/day): 1.2-1.4gm/kg CBW= 70-85  Method Used for Fluid Requirements: 1 ml/kcal  Fluid (ml/day):     Nutrition Diagnosis:   Inadequate oral intake related to cognitive or neurological impairment (2/2 Dementia) as evidenced by intake 0-25%, poor intake prior to admission, swallow study results    Nutrition Interventions:   Food and/or Nutrient Delivery: Continue Current Diet, Start Oral Nutrition Supplement (Continue Diet per SLP. Will Start ONS and monitor. If minimal intake remains x next ~1-2d, would then highly rec for EN support. TF Rec:  Diabetic (Glucerna1.5) @ 40ml/hr (Goal) Continuous x 24hr/d= 960ml TV, 1440kcal, 79gm Pro, 729ml free water.)  Nutrition Education/Counseling: No recommendation at this time  Coordination of Nutrition Care: Continue to monitor while inpatient       Goals:     Goals: PO intake 50% or greater, within 2 days       Nutrition Monitoring and Evaluation:   Behavioral-Environmental Outcomes: None Identified  Food/Nutrient Intake Outcomes: Food and Nutrient Intake, Supplement Intake  Physical Signs/Symptoms Outcomes: Biochemical Data, Chewing or Swallowing, GI Status, Fluid Status or Edema, Nutrition Focused Physical Findings, Skin, Weight    Discharge Planning:     Too soon to determine     Floydene MARU Delgado, LD  Contact: ext [phone]

## 2022-07-23 VITALS
SYSTOLIC BLOOD PRESSURE: 149 MMHG | RESPIRATION RATE: 16 BRPM | WEIGHT: 133 LBS | HEIGHT: 65 IN | OXYGEN SATURATION: 97 % | HEART RATE: 72 BPM | BODY MASS INDEX: 22.16 KG/M2 | TEMPERATURE: 97 F | DIASTOLIC BLOOD PRESSURE: 90 MMHG

## 2022-07-23 LAB
ALBUMIN SERPL-MCNC: 3.7 G/DL (ref 3.5–5.2)
ALP BLD-CCNC: 63 U/L (ref 35–104)
ALT SERPL-CCNC: 14 U/L (ref 0–32)
ANION GAP SERPL CALCULATED.3IONS-SCNC: 13 MMOL/L (ref 7–16)
AST SERPL-CCNC: 23 U/L (ref 0–31)
BASOPHILS ABSOLUTE: 0.02 E9/L (ref 0–0.2)
BASOPHILS RELATIVE PERCENT: 0.5 % (ref 0–2)
BILIRUB SERPL-MCNC: 1.8 MG/DL (ref 0–1.2)
BUN BLDV-MCNC: 3 MG/DL (ref 6–23)
CALCIUM SERPL-MCNC: 8.6 MG/DL (ref 8.6–10.2)
CHLORIDE BLD-SCNC: 106 MMOL/L (ref 98–107)
CO2: 25 MMOL/L (ref 22–29)
CREAT SERPL-MCNC: 0.5 MG/DL (ref 0.5–1)
EOSINOPHILS ABSOLUTE: 0.17 E9/L (ref 0.05–0.5)
EOSINOPHILS RELATIVE PERCENT: 3.9 % (ref 0–6)
GFR AFRICAN AMERICAN: >60
GFR NON-AFRICAN AMERICAN: >60 ML/MIN/1.73
GLUCOSE BLD-MCNC: 109 MG/DL (ref 74–99)
HCT VFR BLD CALC: 39 % (ref 34–48)
HEMOGLOBIN: 13.2 G/DL (ref 11.5–15.5)
IMMATURE GRANULOCYTES #: 0.01 E9/L
IMMATURE GRANULOCYTES %: 0.2 % (ref 0–5)
LYMPHOCYTES ABSOLUTE: 1.5 E9/L (ref 1.5–4)
LYMPHOCYTES RELATIVE PERCENT: 34.3 % (ref 20–42)
MCH RBC QN AUTO: 31.1 PG (ref 26–35)
MCHC RBC AUTO-ENTMCNC: 33.8 % (ref 32–34.5)
MCV RBC AUTO: 91.8 FL (ref 80–99.9)
METER GLUCOSE: 101 MG/DL (ref 74–99)
METER GLUCOSE: 132 MG/DL (ref 74–99)
MONOCYTES ABSOLUTE: 0.37 E9/L (ref 0.1–0.95)
MONOCYTES RELATIVE PERCENT: 8.5 % (ref 2–12)
NEUTROPHILS ABSOLUTE: 2.3 E9/L (ref 1.8–7.3)
NEUTROPHILS RELATIVE PERCENT: 52.6 % (ref 43–80)
PDW BLD-RTO: 13 FL (ref 11.5–15)
PLATELET # BLD: 234 E9/L (ref 130–450)
PMV BLD AUTO: 9 FL (ref 7–12)
POTASSIUM SERPL-SCNC: 3.2 MMOL/L (ref 3.5–5)
RBC # BLD: 4.25 E12/L (ref 3.5–5.5)
SODIUM BLD-SCNC: 144 MMOL/L (ref 132–146)
TOTAL PROTEIN: 6.2 G/DL (ref 6.4–8.3)
WBC # BLD: 4.4 E9/L (ref 4.5–11.5)

## 2022-07-23 PROCEDURE — 85025 COMPLETE CBC W/AUTO DIFF WBC: CPT

## 2022-07-23 PROCEDURE — 80053 COMPREHEN METABOLIC PANEL: CPT

## 2022-07-23 PROCEDURE — 82962 GLUCOSE BLOOD TEST: CPT

## 2022-07-23 PROCEDURE — 6370000000 HC RX 637 (ALT 250 FOR IP): Performed by: INTERNAL MEDICINE

## 2022-07-23 PROCEDURE — 6360000002 HC RX W HCPCS: Performed by: PHYSICIAN ASSISTANT

## 2022-07-23 PROCEDURE — 36415 COLL VENOUS BLD VENIPUNCTURE: CPT

## 2022-07-23 RX ORDER — LOSARTAN POTASSIUM 100 MG/1
100 TABLET ORAL DAILY
Qty: 30 TABLET | Refills: 3 | Status: SHIPPED | OUTPATIENT
Start: 2022-07-24

## 2022-07-23 RX ORDER — DOCUSATE SODIUM 50 MG/5ML
100 LIQUID ORAL DAILY
Qty: 300 ML | Refills: 0 | Status: SHIPPED | OUTPATIENT
Start: 2022-07-24

## 2022-07-23 RX ORDER — LEVETIRACETAM 500 MG/1
500 TABLET ORAL 2 TIMES DAILY
Qty: 60 TABLET | Refills: 3 | Status: SHIPPED | OUTPATIENT
Start: 2022-07-23

## 2022-07-23 RX ORDER — AMIODARONE HYDROCHLORIDE 100 MG/1
100 TABLET ORAL DAILY
Qty: 30 TABLET | Refills: 1 | Status: SHIPPED | OUTPATIENT
Start: 2022-07-24

## 2022-07-23 RX ADMIN — APIXABAN 2.5 MG: 2.5 TABLET, FILM COATED ORAL at 09:01

## 2022-07-23 RX ADMIN — ANTI-FUNGAL POWDER MICONAZOLE NITRATE TALC FREE: 1.42 POWDER TOPICAL at 09:03

## 2022-07-23 RX ADMIN — AMIODARONE HYDROCHLORIDE 100 MG: 200 TABLET ORAL at 09:03

## 2022-07-23 RX ADMIN — LOSARTAN POTASSIUM 100 MG: 25 TABLET, FILM COATED ORAL at 09:03

## 2022-07-23 RX ADMIN — LEVETIRACETAM 500 MG: 5 INJECTION INTRAVENOUS at 09:01

## 2022-07-23 RX ADMIN — POTASSIUM BICARBONATE 20 MEQ: 782 TABLET, EFFERVESCENT ORAL at 12:09

## 2022-07-23 RX ADMIN — METFORMIN HYDROCHLORIDE 500 MG: 500 TABLET ORAL at 09:03

## 2022-07-23 ASSESSMENT — PAIN SCALES - GENERAL: PAINLEVEL_OUTOF10: 0

## 2022-07-23 NOTE — PLAN OF CARE
Problem: Safety - Adult  Goal: Free from fall injury  Outcome: Progressing     Problem: ABCDS Injury Assessment  Goal: Absence of physical injury  Outcome: Progressing     Problem: Skin/Tissue Integrity  Goal: Absence of new skin breakdown  Description: 1. Monitor for areas of redness and/or skin breakdown  2. Assess vascular access sites hourly  3. Every 4-6 hours minimum:  Change oxygen saturation probe site  4. Every 4-6 hours:  If on nasal continuous positive airway pressure, respiratory therapy assess nares and determine need for appliance change or resting period.   Outcome: Progressing     Problem: Chronic Conditions and Co-morbidities  Goal: Patient's chronic conditions and co-morbidity symptoms are monitored and maintained or improved  Outcome: Progressing     Problem: Pain  Goal: Verbalizes/displays adequate comfort level or baseline comfort level  Outcome: Progressing     Problem: Nutrition Deficit:  Goal: Optimize nutritional status  Outcome: Progressing

## 2022-07-23 NOTE — CARE COORDINATION
7/23/2022 social work transition of care planning  Sw received call from floor about transport home. Sw set up [facility] ambulance for 3 pm home. Sw notified pt' s dtr Chon Wahl and will update floor. Electronically signed by CRISTAL Carpio on 7/23/2022 at 11:58 AM    Addendum:Alicia notified [facility] of discharge.   Electronically signed by CRISTAL Carpio on 7/23/2022 at 3:29 PM

## 2022-07-28 NOTE — DISCHARGE SUMMARY
Physician Discharge Summary     Patient ID:  Sydnee Herron  [id number]  80 y.o.  5/10/1931    Admit date: 7/16/2022    Discharge date and time: 7/23/2022  4:21 PM     Admission Diagnoses: Seizure-like activity ([address] [R56.9]    Discharge Diagnoses:   Patient Active Problem List   Diagnosis    Seizure-like activity ([address]   Positive COVID test  Acute delirium  Underlying dementia  New onset of atrial fibrillation with rapid ventricular response    Consults: Neurology/cardiology    Procedures:     Hospital Course:   55-year-old woman admitted through emergency room due to abnormal body movements initially thought to be myoclonic jerks  Neurology has seen the patient and suggested [address]  Patient became lethargic on Keppra  Dose was reduced  Patient became much more alert  She developed new onset of A. fib but that resolved with amiodarone  Discharge to home    Discharge Exam:  Patient was seen on the floor prior to discharge  Son from [place] was at bedside who is a neurosurgeon  Patient was found to have mild cogwheeling suggestive of underlying Parkinson's  Data reviewed in detail with him  For the time being he declined G-tube and hospice services  24-hour care was being provided at home  Discharge instructions discussed    Disposition: Stable at the time of discharge  Discharge to home  Patient Instructions:   Discharge Medication List as of 7/23/2022 11:59 AM        START taking these medications    Details   amiodarone (PACERONE) 100 MG tablet Take 1 tablet by mouth in the morning., Disp-30 tablet, R-1Normal      apixaban (ELIQUIS) 2.5 MG TABS tablet Take 1 tablet by mouth in the morning and 1 tablet before bedtime. , Disp-60 tablet, R-1Normal      losartan (COZAAR) 100 MG tablet Take 1 tablet by mouth in the morning., Disp-30 tablet, R-3Normal      docusate sodium (COLACE) 150 MG/15ML liquid Take 10 mLs by mouth in the morning., Disp-300 mL, R-0Normal      levETIRAcetam (KEPPRA) 500 MG tablet Take 1 tablet by mouth in the morning and 1 tablet before bedtime. , Disp-60 tablet, R-3Normal           CONTINUE these medications which have NOT CHANGED    Details   metFORMIN (GLUCOPHAGE) 500 MG tablet Take 500 mg by mouth daily (with breakfast)Historical Med           STOP taking these medications       nitrofurantoin, macrocrystal-monohydrate, (MACROBID) 100 MG capsule Comments:   Reason for Stopping:         methylphenidate (RITALIN) 5 MG tablet Comments:   Reason for Stopping:         docusate sodium (COLACE, DULCOLAX) 100 MG CAPS Comments:   Reason for Stopping:         polycarbophil (FIBERCON) 625 MG tablet Comments:   Reason for Stopping:         docusate sodium (COLACE) 100 MG capsule Comments:   Reason for Stopping:         olmesartan (BENICAR) 20 MG tablet Comments:   Reason for Stopping:             Activity: As tolerated  Diet: As tolerated    Follow-up with PCP    Note that over 40 minutes was spent in preparing discharge papers, discussing discharge with patient, medication review, etc.    Signed:  Amber Michael MD  7/28/2022  12:42 PM

## 2023-02-11 ENCOUNTER — APPOINTMENT (OUTPATIENT)
Dept: CT IMAGING | Age: 88
End: 2023-02-11
Payer: MEDICARE

## 2023-02-11 ENCOUNTER — HOSPITAL ENCOUNTER (EMERGENCY)
Age: 88
Discharge: ANOTHER HOSPITAL | End: 2023-02-11
Attending: EMERGENCY MEDICINE
Payer: MEDICARE

## 2023-02-11 ENCOUNTER — ANESTHESIA EVENT (OUTPATIENT)
Dept: OPERATING ROOM | Age: 88
End: 2023-02-11

## 2023-02-11 ENCOUNTER — APPOINTMENT (OUTPATIENT)
Dept: GENERAL RADIOLOGY | Age: 88
End: 2023-02-11
Payer: MEDICARE

## 2023-02-11 ENCOUNTER — HOSPITAL ENCOUNTER (INPATIENT)
Age: 88
LOS: 8 days | Discharge: NURSING, REHAB OR LONG-TERM CARE | DRG: 480 | End: 2023-02-19
Attending: EMERGENCY MEDICINE | Admitting: INTERNAL MEDICINE
Payer: MEDICARE

## 2023-02-11 VITALS
SYSTOLIC BLOOD PRESSURE: 105 MMHG | TEMPERATURE: 96.9 F | RESPIRATION RATE: 16 BRPM | OXYGEN SATURATION: 98 % | DIASTOLIC BLOOD PRESSURE: 68 MMHG | WEIGHT: 133 LBS | HEART RATE: 91 BPM | BODY MASS INDEX: 22.13 KG/M2

## 2023-02-11 DIAGNOSIS — R56.9 SEIZURE-LIKE ACTIVITY (HCC): ICD-10-CM

## 2023-02-11 DIAGNOSIS — M25.551 RIGHT HIP PAIN: ICD-10-CM

## 2023-02-11 DIAGNOSIS — Z79.01 ANTICOAGULATED: ICD-10-CM

## 2023-02-11 DIAGNOSIS — M25.512 ACUTE PAIN OF LEFT SHOULDER: ICD-10-CM

## 2023-02-11 DIAGNOSIS — S72.001A CLOSED FRACTURE OF RIGHT HIP, INITIAL ENCOUNTER (HCC): Primary | ICD-10-CM

## 2023-02-11 DIAGNOSIS — I48.91 ATRIAL FIBRILLATION, UNSPECIFIED TYPE (HCC): ICD-10-CM

## 2023-02-11 DIAGNOSIS — S09.90XA CLOSED HEAD INJURY, INITIAL ENCOUNTER: ICD-10-CM

## 2023-02-11 DIAGNOSIS — M97.8XXA PERIPROSTHETIC FRACTURE OF HIP, INITIAL ENCOUNTER: Primary | ICD-10-CM

## 2023-02-11 DIAGNOSIS — Z96.649 PERIPROSTHETIC FRACTURE OF HIP, INITIAL ENCOUNTER: Primary | ICD-10-CM

## 2023-02-11 DIAGNOSIS — Z86.718 HISTORY OF DVT OF LOWER EXTREMITY: ICD-10-CM

## 2023-02-11 LAB
ABO/RH: NORMAL
ALBUMIN SERPL-MCNC: 4.1 G/DL (ref 3.5–5.2)
ALP BLD-CCNC: 94 U/L (ref 35–104)
ALT SERPL-CCNC: 16 U/L (ref 0–32)
AMORPHOUS: ABNORMAL
ANION GAP SERPL CALCULATED.3IONS-SCNC: 13 MMOL/L (ref 7–16)
ANTIBODY SCREEN: NORMAL
APTT: 32 SEC (ref 24.5–35.1)
AST SERPL-CCNC: 27 U/L (ref 0–31)
BACTERIA: ABNORMAL /HPF
BASOPHILS ABSOLUTE: 0.04 E9/L (ref 0–0.2)
BASOPHILS RELATIVE PERCENT: 0.3 % (ref 0–2)
BILIRUB SERPL-MCNC: 1.1 MG/DL (ref 0–1.2)
BILIRUBIN URINE: NEGATIVE
BLOOD, URINE: ABNORMAL
BUN BLDV-MCNC: 24 MG/DL (ref 6–23)
CALCIUM SERPL-MCNC: 9.3 MG/DL (ref 8.6–10.2)
CHLORIDE BLD-SCNC: 105 MMOL/L (ref 98–107)
CLARITY: CLEAR
CO2: 23 MMOL/L (ref 22–29)
COLOR: YELLOW
CREAT SERPL-MCNC: 0.7 MG/DL (ref 0.5–1)
CRYSTALS, UA: ABNORMAL /HPF
EKG ATRIAL RATE: 258 BPM
EKG Q-T INTERVAL: 376 MS
EKG QRS DURATION: 58 MS
EKG QTC CALCULATION (BAZETT): 511 MS
EKG R AXIS: 49 DEGREES
EKG T AXIS: 55 DEGREES
EKG VENTRICULAR RATE: 111 BPM
EOSINOPHILS ABSOLUTE: 0.08 E9/L (ref 0.05–0.5)
EOSINOPHILS RELATIVE PERCENT: 0.6 % (ref 0–6)
GFR SERPL CREATININE-BSD FRML MDRD: >60 ML/MIN/1.73
GLUCOSE BLD-MCNC: 194 MG/DL (ref 74–99)
GLUCOSE URINE: NEGATIVE MG/DL
HCT VFR BLD CALC: 37.1 % (ref 34–48)
HEMOGLOBIN: 12 G/DL (ref 11.5–15.5)
IMMATURE GRANULOCYTES #: 0.05 E9/L
IMMATURE GRANULOCYTES %: 0.4 % (ref 0–5)
INR BLD: 1.5
INR BLD: 1.7
KETONES, URINE: 15 MG/DL
LACTIC ACID: 2.5 MMOL/L (ref 0.5–2.2)
LEUKOCYTE ESTERASE, URINE: NEGATIVE
LYMPHOCYTES ABSOLUTE: 1.3 E9/L (ref 1.5–4)
LYMPHOCYTES RELATIVE PERCENT: 9.5 % (ref 20–42)
MAGNESIUM: 2 MG/DL (ref 1.6–2.6)
MCH RBC QN AUTO: 30.6 PG (ref 26–35)
MCHC RBC AUTO-ENTMCNC: 32.3 % (ref 32–34.5)
MCV RBC AUTO: 94.6 FL (ref 80–99.9)
METER GLUCOSE: 196 MG/DL (ref 74–99)
METER GLUCOSE: 260 MG/DL (ref 74–99)
MONOCYTES ABSOLUTE: 0.5 E9/L (ref 0.1–0.95)
MONOCYTES RELATIVE PERCENT: 3.6 % (ref 2–12)
NEUTROPHILS ABSOLUTE: 11.73 E9/L (ref 1.8–7.3)
NEUTROPHILS RELATIVE PERCENT: 85.6 % (ref 43–80)
NITRITE, URINE: NEGATIVE
PDW BLD-RTO: 13 FL (ref 11.5–15)
PH UA: 5 (ref 5–9)
PLATELET # BLD: 265 E9/L (ref 130–450)
PMV BLD AUTO: 9.3 FL (ref 7–12)
POTASSIUM SERPL-SCNC: 3.7 MMOL/L (ref 3.5–5)
PRO-BNP: 130 PG/ML (ref 0–450)
PROTEIN UA: 30 MG/DL
PROTHROMBIN TIME: 16.7 SEC (ref 9.3–12.4)
PROTHROMBIN TIME: 18.5 SEC (ref 9.3–12.4)
RBC # BLD: 3.92 E12/L (ref 3.5–5.5)
RBC UA: ABNORMAL /HPF (ref 0–2)
SARS-COV-2, NAAT: NOT DETECTED
SODIUM BLD-SCNC: 141 MMOL/L (ref 132–146)
SPECIFIC GRAVITY UA: >=1.03 (ref 1–1.03)
TOTAL CK: 156 U/L (ref 20–180)
TOTAL PROTEIN: 6.7 G/DL (ref 6.4–8.3)
TROPONIN, HIGH SENSITIVITY: 19 NG/L (ref 0–9)
TSH SERPL DL<=0.05 MIU/L-ACNC: 10.94 UIU/ML (ref 0.27–4.2)
UROBILINOGEN, URINE: 0.2 E.U./DL
WBC # BLD: 13.7 E9/L (ref 4.5–11.5)
WBC UA: ABNORMAL /HPF (ref 0–5)

## 2023-02-11 PROCEDURE — 73590 X-RAY EXAM OF LOWER LEG: CPT

## 2023-02-11 PROCEDURE — 73521 X-RAY EXAM HIPS BI 2 VIEWS: CPT

## 2023-02-11 PROCEDURE — 84443 ASSAY THYROID STIM HORMONE: CPT

## 2023-02-11 PROCEDURE — 83880 ASSAY OF NATRIURETIC PEPTIDE: CPT

## 2023-02-11 PROCEDURE — 82962 GLUCOSE BLOOD TEST: CPT

## 2023-02-11 PROCEDURE — 86850 RBC ANTIBODY SCREEN: CPT

## 2023-02-11 PROCEDURE — 74177 CT ABD & PELVIS W/CONTRAST: CPT

## 2023-02-11 PROCEDURE — 2500000003 HC RX 250 WO HCPCS: Performed by: EMERGENCY MEDICINE

## 2023-02-11 PROCEDURE — 80053 COMPREHEN METABOLIC PANEL: CPT

## 2023-02-11 PROCEDURE — 73552 X-RAY EXAM OF FEMUR 2/>: CPT

## 2023-02-11 PROCEDURE — 85610 PROTHROMBIN TIME: CPT

## 2023-02-11 PROCEDURE — 81001 URINALYSIS AUTO W/SCOPE: CPT

## 2023-02-11 PROCEDURE — 83735 ASSAY OF MAGNESIUM: CPT

## 2023-02-11 PROCEDURE — 99285 EMERGENCY DEPT VISIT HI MDM: CPT

## 2023-02-11 PROCEDURE — 93010 ELECTROCARDIOGRAM REPORT: CPT | Performed by: INTERNAL MEDICINE

## 2023-02-11 PROCEDURE — 6360000002 HC RX W HCPCS: Performed by: EMERGENCY MEDICINE

## 2023-02-11 PROCEDURE — 82550 ASSAY OF CK (CPK): CPT

## 2023-02-11 PROCEDURE — 6370000000 HC RX 637 (ALT 250 FOR IP): Performed by: INTERNAL MEDICINE

## 2023-02-11 PROCEDURE — 70450 CT HEAD/BRAIN W/O DYE: CPT

## 2023-02-11 PROCEDURE — 93005 ELECTROCARDIOGRAM TRACING: CPT | Performed by: EMERGENCY MEDICINE

## 2023-02-11 PROCEDURE — 96374 THER/PROPH/DIAG INJ IV PUSH: CPT

## 2023-02-11 PROCEDURE — 2580000003 HC RX 258: Performed by: EMERGENCY MEDICINE

## 2023-02-11 PROCEDURE — 2580000003 HC RX 258: Performed by: INTERNAL MEDICINE

## 2023-02-11 PROCEDURE — 84484 ASSAY OF TROPONIN QUANT: CPT

## 2023-02-11 PROCEDURE — 83605 ASSAY OF LACTIC ACID: CPT

## 2023-02-11 PROCEDURE — 86901 BLOOD TYPING SEROLOGIC RH(D): CPT

## 2023-02-11 PROCEDURE — 73030 X-RAY EXAM OF SHOULDER: CPT

## 2023-02-11 PROCEDURE — 85025 COMPLETE CBC W/AUTO DIFF WBC: CPT

## 2023-02-11 PROCEDURE — 86900 BLOOD TYPING SEROLOGIC ABO: CPT

## 2023-02-11 PROCEDURE — 36415 COLL VENOUS BLD VENIPUNCTURE: CPT

## 2023-02-11 PROCEDURE — 85730 THROMBOPLASTIN TIME PARTIAL: CPT

## 2023-02-11 PROCEDURE — 6360000004 HC RX CONTRAST MEDICATION: Performed by: RADIOLOGY

## 2023-02-11 PROCEDURE — 2500000003 HC RX 250 WO HCPCS: Performed by: INTERNAL MEDICINE

## 2023-02-11 PROCEDURE — 96376 TX/PRO/DX INJ SAME DRUG ADON: CPT

## 2023-02-11 PROCEDURE — 72125 CT NECK SPINE W/O DYE: CPT

## 2023-02-11 PROCEDURE — 2140000000 HC CCU INTERMEDIATE R&B

## 2023-02-11 PROCEDURE — 6360000002 HC RX W HCPCS: Performed by: STUDENT IN AN ORGANIZED HEALTH CARE EDUCATION/TRAINING PROGRAM

## 2023-02-11 PROCEDURE — 71045 X-RAY EXAM CHEST 1 VIEW: CPT

## 2023-02-11 PROCEDURE — 87635 SARS-COV-2 COVID-19 AMP PRB: CPT

## 2023-02-11 RX ORDER — FENTANYL CITRATE 50 UG/ML
25 INJECTION, SOLUTION INTRAMUSCULAR; INTRAVENOUS ONCE
Status: COMPLETED | OUTPATIENT
Start: 2023-02-11 | End: 2023-02-11

## 2023-02-11 RX ORDER — LORAZEPAM 0.5 MG/1
0.5 TABLET ORAL EVERY 6 HOURS PRN
Status: ON HOLD | COMMUNITY
End: 2023-02-19 | Stop reason: SDUPTHER

## 2023-02-11 RX ORDER — MORPHINE SULFATE 2 MG/ML
2 INJECTION, SOLUTION INTRAMUSCULAR; INTRAVENOUS EVERY 4 HOURS PRN
Status: DISCONTINUED | OUTPATIENT
Start: 2023-02-11 | End: 2023-02-19 | Stop reason: HOSPADM

## 2023-02-11 RX ORDER — DOCUSATE SODIUM 50 MG/5ML
100 LIQUID ORAL DAILY
Status: DISCONTINUED | OUTPATIENT
Start: 2023-02-11 | End: 2023-02-11

## 2023-02-11 RX ORDER — LOSARTAN POTASSIUM 50 MG/1
100 TABLET ORAL DAILY
Status: DISCONTINUED | OUTPATIENT
Start: 2023-02-11 | End: 2023-02-11

## 2023-02-11 RX ORDER — DILTIAZEM HYDROCHLORIDE 5 MG/ML
10 INJECTION INTRAVENOUS ONCE
Status: DISCONTINUED | OUTPATIENT
Start: 2023-02-11 | End: 2023-02-11

## 2023-02-11 RX ORDER — INSULIN GLARGINE 100 [IU]/ML
18 INJECTION, SOLUTION SUBCUTANEOUS NIGHTLY
Status: ON HOLD | COMMUNITY
End: 2023-02-11

## 2023-02-11 RX ORDER — INSULIN LISPRO 100 [IU]/ML
0-8 INJECTION, SOLUTION INTRAVENOUS; SUBCUTANEOUS
Status: DISCONTINUED | OUTPATIENT
Start: 2023-02-11 | End: 2023-02-19 | Stop reason: HOSPADM

## 2023-02-11 RX ORDER — LEVETIRACETAM 500 MG/1
500 TABLET ORAL 2 TIMES DAILY
Status: DISCONTINUED | OUTPATIENT
Start: 2023-02-11 | End: 2023-02-11

## 2023-02-11 RX ORDER — ACETAMINOPHEN 500 MG
500 TABLET ORAL EVERY 4 HOURS PRN
Status: DISCONTINUED | OUTPATIENT
Start: 2023-02-11 | End: 2023-02-11

## 2023-02-11 RX ORDER — INSULIN LISPRO 100 [IU]/ML
0-4 INJECTION, SOLUTION INTRAVENOUS; SUBCUTANEOUS NIGHTLY
Status: DISCONTINUED | OUTPATIENT
Start: 2023-02-11 | End: 2023-02-19 | Stop reason: HOSPADM

## 2023-02-11 RX ORDER — POLYETHYLENE GLYCOL 3350 17 G/17G
17 POWDER, FOR SOLUTION ORAL DAILY
COMMUNITY

## 2023-02-11 RX ORDER — AMIODARONE HYDROCHLORIDE 200 MG/1
200 TABLET ORAL DAILY
Status: DISCONTINUED | OUTPATIENT
Start: 2023-02-11 | End: 2023-02-19 | Stop reason: HOSPADM

## 2023-02-11 RX ORDER — LEVOTHYROXINE SODIUM 0.03 MG/1
25 TABLET ORAL DAILY
Status: DISCONTINUED | OUTPATIENT
Start: 2023-02-11 | End: 2023-02-19 | Stop reason: HOSPADM

## 2023-02-11 RX ORDER — HYDROCODONE BITARTRATE AND ACETAMINOPHEN 5; 325 MG/1; MG/1
1 TABLET ORAL EVERY 6 HOURS PRN
Status: DISCONTINUED | OUTPATIENT
Start: 2023-02-11 | End: 2023-02-19 | Stop reason: HOSPADM

## 2023-02-11 RX ORDER — SENNOSIDES 8.8 MG/5ML
10 LIQUID ORAL DAILY
COMMUNITY

## 2023-02-11 RX ORDER — LEVOTHYROXINE SODIUM 0.03 MG/1
25 TABLET ORAL DAILY
COMMUNITY

## 2023-02-11 RX ORDER — DILTIAZEM HYDROCHLORIDE 5 MG/ML
10 INJECTION INTRAVENOUS ONCE
Status: COMPLETED | OUTPATIENT
Start: 2023-02-11 | End: 2023-02-11

## 2023-02-11 RX ORDER — DEXTROSE AND SODIUM CHLORIDE 5; .45 G/100ML; G/100ML
INJECTION, SOLUTION INTRAVENOUS CONTINUOUS
Status: DISCONTINUED | OUTPATIENT
Start: 2023-02-11 | End: 2023-02-19

## 2023-02-11 RX ORDER — DEXTROSE MONOHYDRATE 100 MG/ML
INJECTION, SOLUTION INTRAVENOUS CONTINUOUS PRN
Status: DISCONTINUED | OUTPATIENT
Start: 2023-02-11 | End: 2023-02-19 | Stop reason: HOSPADM

## 2023-02-11 RX ORDER — LORAZEPAM 0.5 MG/1
0.5 TABLET ORAL EVERY 6 HOURS PRN
Status: DISCONTINUED | OUTPATIENT
Start: 2023-02-11 | End: 2023-02-19 | Stop reason: HOSPADM

## 2023-02-11 RX ORDER — POLYETHYLENE GLYCOL 3350 17 G/17G
17 POWDER, FOR SOLUTION ORAL DAILY
Status: DISCONTINUED | OUTPATIENT
Start: 2023-02-11 | End: 2023-02-19 | Stop reason: HOSPADM

## 2023-02-11 RX ORDER — AMIODARONE HYDROCHLORIDE 200 MG/1
100 TABLET ORAL DAILY
Status: DISCONTINUED | OUTPATIENT
Start: 2023-02-11 | End: 2023-02-11

## 2023-02-11 RX ORDER — ACETAMINOPHEN 500 MG
500 TABLET ORAL EVERY 4 HOURS PRN
Status: DISCONTINUED | OUTPATIENT
Start: 2023-02-11 | End: 2023-02-19 | Stop reason: HOSPADM

## 2023-02-11 RX ORDER — POLYETHYLENE GLYCOL 3350 17 G/17G
17 POWDER, FOR SOLUTION ORAL DAILY
Status: DISCONTINUED | OUTPATIENT
Start: 2023-02-11 | End: 2023-02-11

## 2023-02-11 RX ADMIN — IOPAMIDOL 75 ML: 755 INJECTION, SOLUTION INTRAVENOUS at 10:14

## 2023-02-11 RX ADMIN — AMIODARONE HYDROCHLORIDE 200 MG: 200 TABLET ORAL at 18:26

## 2023-02-11 RX ADMIN — LEVOTHYROXINE SODIUM 25 MCG: 0.05 TABLET ORAL at 17:23

## 2023-02-11 RX ADMIN — DILTIAZEM HYDROCHLORIDE 5 MG/HR: 100 INJECTION, POWDER, LYOPHILIZED, FOR SOLUTION INTRAVENOUS at 14:48

## 2023-02-11 RX ADMIN — DILTIAZEM HYDROCHLORIDE 10 MG: 5 INJECTION INTRAVENOUS at 14:44

## 2023-02-11 RX ADMIN — DEXTROSE AND SODIUM CHLORIDE: 5; 450 INJECTION, SOLUTION INTRAVENOUS at 17:46

## 2023-02-11 RX ADMIN — MORPHINE SULFATE 2 MG: 2 INJECTION, SOLUTION INTRAMUSCULAR; INTRAVENOUS at 17:23

## 2023-02-11 RX ADMIN — FENTANYL CITRATE 25 MCG: 50 INJECTION, SOLUTION INTRAMUSCULAR; INTRAVENOUS at 10:00

## 2023-02-11 RX ADMIN — FENTANYL CITRATE 25 MCG: 50 INJECTION, SOLUTION INTRAMUSCULAR; INTRAVENOUS at 12:24

## 2023-02-11 RX ADMIN — SENNOSIDES 10 ML: 8.8 SYRUP ORAL at 18:27

## 2023-02-11 RX ADMIN — POLYETHYLENE GLYCOL 3350 17 G: 17 POWDER, FOR SOLUTION ORAL at 17:23

## 2023-02-11 ASSESSMENT — PAIN SCALES - GENERAL: PAINLEVEL_OUTOF10: 10

## 2023-02-11 ASSESSMENT — PAIN DESCRIPTION - LOCATION: LOCATION: LEG

## 2023-02-11 ASSESSMENT — PAIN DESCRIPTION - DESCRIPTORS: DESCRIPTORS: DISCOMFORT;NAGGING;SORE

## 2023-02-11 ASSESSMENT — PAIN DESCRIPTION - ORIENTATION: ORIENTATION: RIGHT

## 2023-02-11 ASSESSMENT — PAIN - FUNCTIONAL ASSESSMENT: PAIN_FUNCTIONAL_ASSESSMENT: PREVENTS OR INTERFERES WITH ALL ACTIVE AND SOME PASSIVE ACTIVITIES

## 2023-02-11 NOTE — ED PROVIDER NOTES
HPI:  23,   Time: 1:29 PM BECKI Aguilar is a 80 y.o. female presenting to the ED for fall/right hip fracture, beginning hrs ago. The complaint has been persistent, moderate in severity, and worsened by movement of rle. From [place]. Patient history limited due to dementia. Per reviewing previous notes had mechanical fall and right periprosthetic fracture. History of A-fib. On Eliquis. Sent for prior level Ortho care. Review of Systems:   Pertinent positives and negatives are stated within HPI, all other systems reviewed and are negative.          --------------------------------------------- PAST HISTORY ---------------------------------------------  Past Medical History:  has a past medical history of Diabetes mellitus ([facility] [address] DVT (deep venous thrombosis) ([facility] Fall, and Hypertension. Past Surgical History:  has a past surgical history that includes  section; knee surgery; hip surgery; and Colonoscopy. Social History:  reports that she has never smoked. She has never used smokeless tobacco. She reports current alcohol use of about 1.0 standard drink per week. She reports that she does not use drugs. Family History: family history is not on file. The patients home medications have been reviewed. Allergies: Sulfa antibiotics        ---------------------------------------------------PHYSICAL EXAM--------------------------------------    Constitutional/General: Alert and oriented x1    Chronically ill-appearing  Head: Normocephalic and atraumatic  Eyes: PERRL, EOMI, conjunctive normal, sclera non icteric  Mouth: Oropharynx clear, handling secretions,   Neck: Supple, full ROM,   Respiratory:  Not in respiratory distress  Cardiovascular: Irregular irregular rhythm with tachycardia 2+ distal pulses  Chest: No chest wall tenderness  GI:  Abdomen Soft, Non tender, Non distended. Musculoskeletal: Right lower extremity shortened externally rotated.   In neurovascular tact.  Otherwise extremities have normal  Integument: skin warm and dry. No rashes. Lymphatic: no lymphadenopathy noted  Neurologic: GCS 14, no focal deficits, Psychiatric: Normal Affect      Medical Decision Making:    Patient transferred for right periprosthetic fracture. Also history A-fib. On arrival patient A-fib with RVR. Labs reviewed from previous visit. Imaging reviewed as well. No head injury. Placed on Cardizem drip. Heart rate improved to less than 100. Ortho consulted will admit medically due to A-fib with RVR. Family updated agreeable plan of care.      -------------------------------------------------- RESULTS -------------------------------------------------  I have personally reviewed all laboratory and imaging results for this patient. Results are listed below. LABS:  Results for orders placed or performed during the hospital encounter of 02/11/23   EKG 12 Lead   Result Value Ref Range    Ventricular Rate 100 BPM    Atrial Rate 100 BPM    P-R Interval 206 ms    QRS Duration 56 ms    Q-T Interval 352 ms    QTc Calculation (Bazett) 454 ms    P Axis 66 degrees    R Axis 60 degrees    T Axis 76 degrees       RADIOLOGY:  Interpreted by Radiologist.  No orders to display       EKG: This EKG is signed and interpreted by the EP. Time: 1338  Rate: 100  Rhythm: afib  Interpretation: non-specific EKG  Comparison: None      ------------------------- NURSING NOTES AND VITALS REVIEWED ---------------------------   The nursing notes within the ED encounter and vital signs as below have been reviewed by myself. /60   Pulse 95   Temp 97.5 °F (36.4 °C) (Oral)   Resp 18   SpO2 98%   Oxygen Saturation Interpretation: Normal    The patients available past medical records and past encounters were reviewed.         ------------------------------ ED COURSE/MEDICAL DECISION MAKING----------------------  Medications   dilTIAZem injection 10 mg (10 mg IntraVENous Given 2/11/23 0174)     Followed by   dilTIAZem 100 mg in sodium chloride 0.9 % 100 mL infusion (ADD-[place]) (5 mg/hr IntraVENous New Bag 2/11/23 1447)   morphine (PF) injection 2 mg (has no administration in time range)   acetaminophen (TYLENOL) tablet 500 mg (has no administration in time range)   HYDROcodone-acetaminophen (NORCO) 5-325 MG per tablet 1 tablet (has no administration in time range)   levothyroxine (SYNTHROID) tablet 25 mcg (has no administration in time range)   LORazepam (ATIVAN) tablet 0.5 mg (has no administration in time range)   polyethylene glycol (GLYCOLAX) powder 17 g (has no administration in time range)   sennosides (SENOKOT) 8.8 MG/5ML syrup 10 mL (has no administration in time range)   insulin lispro (HUMALOG) injection vial 0-8 Units (has no administration in time range)   insulin lispro (HUMALOG) injection vial 0-4 Units (has no administration in time range)   glucose chewable tablet 16 g (has no administration in time range)   dextrose bolus 10% 125 mL (has no administration in time range)     Or   dextrose bolus 10% 250 mL (has no administration in time range)   glucagon injection 1 mg (has no administration in time range)   dextrose 10 % infusion (has no administration in time range)   dextrose 5 % and 0.45 % sodium chloride infusion (has no administration in time range)   trimethobenzamide (TIGAN) injection 100 mg (has no administration in time range)         ED COURSE:             This patient's ED course included: a personal history and physicial examination    This patient has remained hemodynamically stable during their ED course. Re-Evaluations:             Re-evaluation. Patients symptoms are improving    Re-examination  2/11/23   2 pm  Hr <110      Consultations:             Luis Eduardo flowers    Critical Care: Care time 35 minutes excluding billable procedures        Counseling:    The emergency provider has spoken with the family member son and discussed todays results, in addition to providing specific details for the plan of care and counseling regarding the diagnosis and prognosis. Questions are answered at this time and they are agreeable with the plan.       --------------------------------- IMPRESSION AND DISPOSITION ---------------------------------    IMPRESSION  1. Closed fracture of right hip, initial encounter ([facility]    2. Atrial fibrillation, unspecified type ([facility]    3. Afib with rvr    DISPOSITION  Disposition: Admit to telemetry  Patient condition is stable    NOTE: This report was transcribed using voice recognition software.  Every effort was made to ensure accuracy; however, inadvertent computerized transcription errors may be present        Lorena Og MD  02/11/23 8128

## 2023-02-11 NOTE — ED NOTES
Pts family member Dr Marily Haile is at bedside. Dr Sophy Lucas and ortho notified to update on plan of care.       Dayanna Lang RN  02/11/23 6354

## 2023-02-11 NOTE — ED PROVIDER NOTES
[address]        Pt Name: Prudence Vazquez  MRN: [id number]  [place] 5/10/1931  Date of evaluation: 2023  Provider: Joss Tamez DO  PCP: Beto Monreal DO  Note Started: 8:35 AM EST 23    CHIEF COMPLAINT       Chief Complaint   Patient presents with    Fall     Unwitnessed fall this AM, on eliquis    Hip Pain     Right hip pain/rotation       HISTORY OF PRESENT ILLNESS: 1 or more Elements     History from : EMS and nurse at [facility] at [address] [place] [address]    Limitations to history : dementia    Prudence Vazquez is a 80 y.o. female who resides at [facility] at [address] [place] [address] in memory care unit for dementia presents for fall, has history of dvt, right hip orthoplasty on eliquis and amiodarone. Pt is also on keppra. Pt found for unwitnessed fall. Pt c/o severe right hip pain, thigh paiun, left shoulder pain. Pt dneies any head injury however has bruising to her head. Pt denies any back pain abdominal pain. Nurse notes patient has pretty significant dementia and is on the memory care unit. Patient was found when they went to a dresser this morning on the floor. She was complaining of right hip pain to the staff there. It is unknown how long she has been on the floor. Nursing Notes were all reviewed and agreed with or any disagreements were addressed in the HPI. REVIEW OF SYSTEMS :      Review of Systems   Unable to perform ROS: Dementia     Positives and Pertinent negatives as per HPI.      SURGICAL HISTORY     Past Surgical History:   Procedure Laterality Date     SECTION      x4    COLONOSCOPY      HIP SURGERY      right    KNEE SURGERY      left       CURRENTMEDICATIONS       Discharge Medication List as of 2023 12:56 PM        CONTINUE these medications which have NOT CHANGED    Details   amiodarone (PACERONE) 100 MG tablet Take 1 tablet by mouth in the morning., Disp-30 tablet, R-1Normal      apixaban (ELIQUIS) 2.5 MG TABS tablet Take 1 tablet by mouth in the morning and 1 tablet before bedtime. , Disp-60 tablet, R-1Normal      losartan (COZAAR) 100 MG tablet Take 1 tablet by mouth in the morning., Disp-30 tablet, R-3Normal      docusate sodium (COLACE) 150 MG/15ML liquid Take 10 mLs by mouth in the morning., Disp-300 mL, R-0Normal      levETIRAcetam (KEPPRA) 500 MG tablet Take 1 tablet by mouth in the morning and 1 tablet before bedtime. , Disp-60 tablet, R-3Normal      metFORMIN (GLUCOPHAGE) 500 MG tablet Take 500 mg by mouth daily (with breakfast)Historical Med             ALLERGIES     Sulfa antibiotics    FAMILYHISTORY     No family history on file. SOCIAL HISTORY       Social History     Tobacco Use    Smoking status: Never    Smokeless tobacco: Never   Substance Use Topics    Alcohol use: Yes     Alcohol/week: 1.0 standard drink     Types: 1 Glasses of wine per week     Comment: rare    Drug use: No       SCREENINGS        Shaji Coma Scale  Eye Opening: Spontaneous  Best Verbal Response: Confused  Best Motor Response: Obeys commands  Noble Coma Scale Score: 14                CIWA Assessment  BP: 105/68  Heart Rate: 91           PHYSICAL EXAM  1 or more Elements     ED Triage Vitals [02/11/23 0826]   BP Temp Temp src Heart Rate Resp SpO2 Height Weight   123/75 96.9 °F (36.1 °C) -- (!) 126 16 100 % -- 133 lb (60.3 kg)       Physical Exam  Vitals reviewed. Constitutional:       General: She is not in acute distress. Appearance: Normal appearance. She is not toxic-appearing. HENT:      Head: Normocephalic and atraumatic. Right Ear: External ear normal.      Left Ear: External ear normal.      Nose: Nose normal. No congestion. Mouth/Throat:      Mouth: Mucous membranes are moist.      Pharynx: Oropharynx is clear. No posterior oropharyngeal erythema. Eyes:      Extraocular Movements: Extraocular movements intact. Pupils: Pupils are equal, round, and reactive to light. Cardiovascular:      Rate and Rhythm: Normal rate and regular rhythm. Pulses: Normal pulses. Heart sounds: No murmur heard. Pulmonary:      Effort: Pulmonary effort is normal.      Breath sounds: No wheezing or rhonchi. Chest:      Chest wall: No tenderness. Abdominal:      General: Bowel sounds are normal.      Tenderness: There is no abdominal tenderness. There is no right CVA tenderness, left CVA tenderness or guarding. Musculoskeletal:         General: No swelling. Left shoulder: Swelling and tenderness present. Cervical back: Normal range of motion and neck supple. No muscular tenderness. Right hip: Deformity, tenderness and bony tenderness present. No crepitus. Legs:       Comments: Right hip pain, old scar intact, right leg shortened and rotated  Pulses intact   Skin:     General: Skin is warm and dry. Capillary Refill: Capillary refill takes less than 2 seconds. Neurological:      General: No focal deficit present. Mental Status: She is alert and oriented to person, place, and time.    Psychiatric:         Mood and Affect: Mood normal.           DIAGNOSTIC RESULTS   LABS:    Labs Reviewed   CBC WITH AUTO DIFFERENTIAL - Abnormal; Notable for the following components:       Result Value    WBC 13.7 (*)     Neutrophils % 85.6 (*)     Lymphocytes % 9.5 (*)     Neutrophils Absolute 11.73 (*)     Lymphocytes Absolute 1.30 (*)     All other components within normal limits   COMPREHENSIVE METABOLIC PANEL - Abnormal; Notable for the following components:    Glucose 194 (*)     BUN 24 (*)     All other components within normal limits   LACTIC ACID - Abnormal; Notable for the following components:    Lactic Acid 2.5 (*)     All other components within normal limits   PROTIME-INR - Abnormal; Notable for the following components:    Protime 16.7 (*)     All other components within normal limits   TROPONIN - Abnormal; Notable for the following components:    Troponin, High Sensitivity 19 (*)     All other components within normal limits   TSH - Abnormal; Notable for the following components:    TSH 10.940 (*)     All other components within normal limits   URINALYSIS WITH MICROSCOPIC - Abnormal; Notable for the following components:    Ketones, Urine 15 (*)     Blood, Urine LARGE (*)     Protein, UA 30 (*)     RBC, UA 10-20 (*)     Bacteria, UA FEW (*)     Crystals, UA Rare (*)     All other components within normal limits   COVID-19, RAPID   MAGNESIUM   BRAIN NATRIURETIC PEPTIDE   CK     Results for orders placed or performed during the hospital encounter of 02/11/23   COVID-19, Rapid    Specimen: Nasopharyngeal Swab   Result Value Ref Range    SARS-CoV-2, NAAT Not Detected Not Detected   CBC with Auto Differential   Result Value Ref Range    WBC 13.7 (H) 4.5 - 11.5 E9/L    RBC 3.92 3.50 - 5.50 E12/L    Hemoglobin 12.0 11.5 - 15.5 g/dL    Hematocrit 37.1 34.0 - 48.0 %    MCV 94.6 80.0 - 99.9 fL    MCH 30.6 26.0 - 35.0 pg    MCHC 32.3 32.0 - 34.5 %    RDW 13.0 11.5 - 15.0 fL    Platelets 240 760 - 010 E9/L    MPV 9.3 7.0 - 12.0 fL    Neutrophils % 85.6 (H) 43.0 - 80.0 %    Immature Granulocytes % 0.4 0.0 - 5.0 %    Lymphocytes % 9.5 (L) 20.0 - 42.0 %    Monocytes % 3.6 2.0 - 12.0 %    Eosinophils % 0.6 0.0 - 6.0 %    Basophils % 0.3 0.0 - 2.0 %    Neutrophils Absolute 11.73 (H) 1.80 - 7.30 E9/L    Immature Granulocytes # 0.05 E9/L    Lymphocytes Absolute 1.30 (L) 1.50 - 4.00 E9/L    Monocytes Absolute 0.50 0.10 - 0.95 E9/L    Eosinophils Absolute 0.08 0.05 - 0.50 E9/L    Basophils Absolute 0.04 0.00 - 0.20 E9/L   CMP   Result Value Ref Range    Sodium 141 132 - 146 mmol/L    Potassium 3.7 3.5 - 5.0 mmol/L    Chloride 105 98 - 107 mmol/L    CO2 23 22 - 29 mmol/L    Anion Gap 13 7 - 16 mmol/L    Glucose 194 (H) 74 - 99 mg/dL    BUN 24 (H) 6 - 23 mg/dL    Creatinine 0.7 0.5 - 1.0 mg/dL    Est, Glom Filt Rate >60 >=60 mL/min/1.73    Calcium 9.3 8.6 - 10.2 mg/dL    Total Protein 6.7 6.4 - 8.3 g/dL    Albumin 4.1 3.5 - 5.2 g/dL    Total Bilirubin 1.1 0.0 - 1.2 mg/dL    Alkaline Phosphatase 94 35 - 104 U/L    ALT 16 0 - 32 U/L    AST 27 0 - 31 U/L   Lactic Acid   Result Value Ref Range    Lactic Acid 2.5 (H) 0.5 - 2.2 mmol/L   Magnesium   Result Value Ref Range    Magnesium 2.0 1.6 - 2.6 mg/dL   Protime-INR   Result Value Ref Range    Protime 16.7 (H) 9.3 - 12.4 sec    INR 1.5    Troponin   Result Value Ref Range    Troponin, High Sensitivity 19 (H) 0 - 9 ng/L   TSH   Result Value Ref Range    TSH 10.940 (H) 0.270 - 4.200 uIU/mL   Urinalysis with Microscopic   Result Value Ref Range    Color, UA Yellow Straw/Yellow    Clarity, UA Clear Clear    Glucose, Ur Negative Negative mg/dL    Bilirubin Urine Negative Negative    Ketones, Urine 15 (A) Negative mg/dL    Specific Gravity, UA >=1.030 1.005 - 1.030    Blood, Urine LARGE (A) Negative    pH, UA 5.0 5.0 - 9.0    Protein, UA 30 (A) Negative mg/dL    Urobilinogen, Urine 0.2 <2.0 E.U./dL    Nitrite, Urine Negative Negative    Leukocyte Esterase, Urine Negative Negative    WBC, UA 0-1 0 - 5 /HPF    RBC, UA 10-20 (A) 0 - 2 /HPF    Bacteria, UA FEW (A) None Seen /HPF    Amorphous, UA RARE     Crystals, UA Rare (A) None Seen /HPF   Brain Natriuretic Peptide   Result Value Ref Range    Pro- 0 - 450 pg/mL   CK   Result Value Ref Range    Total  20 - 180 U/L   EKG 12 Lead   Result Value Ref Range    Ventricular Rate 111 BPM    Atrial Rate 258 BPM    QRS Duration 58 ms    Q-T Interval 376 ms    QTc Calculation (Bazett) 511 ms    R Axis 49 degrees    T Axis 55 degrees       When ordered only abnormal lab results are displayed. All other labs were within normal range or not returned as of this dictation.     EKG:   interpreted by the ED Provider with the below findings:  0840  Atrial fibrillation  Hr 111  No acute ischemia, qtc prolonged, axis normal  Similar to prior ecg    RADIOLOGY:   Non-plain film images such as CT, Ultrasound and MRI are read by the radiologist. Bobby Ross radiographic images are visualized and preliminarily interpreted by the ED Provider with the below findings:    Imaging showing periprosthetic fracture right hip    Interpretation per the Radiologist below, if available at the time of this note:    CT ABDOMEN PELVIS W IV CONTRAST Additional Contrast? None   Final Result   1. Slightly suboptimal evaluation of the pelvis, as described above. 2.  Probable tiny splenic cysts. 3.  Probable focal gallbladder adenomyomatosis, as described above. 4.  Bilateral nephrolithiasis. 5.  Bilateral renal cysts. 6.  Type 3 hiatal hernia. 7.  Colonic diverticulosis. 8.  Significant amount of retained stool within the rectum, with diffuse   rectal wall thickening and mild surrounding linear stranding, as well as   presacral edema. This indicates stercoral colitis. 9.  Contained fluid collection with mildly thickened wall in the subcutaneous   soft tissues of the ventral right hemipelvis. This could represent a seroma   or infected fluid collection. Clinical correlation is recommended. 10.  Right periprosthetic fracture involving the proximal right femur. 11.  Other findings, as described above. XR HIP BILATERAL W AP PELVIS (2 VIEWS)   Final Result   1. Presence of a right hip prosthesis. 2.  Presence of an acute displaced periprosthetic fracture of the proximal   right femur extending to the intertrochanteric region. XR FEMUR RIGHT (MIN 2 VIEWS)   Final Result   Acute displaced periprosthetic fracture proximal right femur. XR TIBIA FIBULA RIGHT (2 VIEWS)   Final Result   No acute fractures in the right tibia and fibula. XR SHOULDER LEFT (MIN 2 VIEWS)   Final Result   1. There is no fracture dislocation of the left shoulder   2. Advanced degenerative changes of the left shoulder. XR CHEST PORTABLE   Final Result   No acute process.       Large hiatal hernia CT HEAD WO CONTRAST   Final Result   1. There is no acute intracranial abnormality. Specifically, there is no   intracranial hemorrhage. 2. Atrophy and periventricular leukomalacia,   . CT CERVICAL SPINE WO CONTRAST   Final Result   1. There is no acute compression fracture or subluxation of the cervical   spine. 2. Multilevel degenerative disc and degenerative joint disease. No results found. No results found. PROCEDURES   Unless otherwise noted below, none     Procedures    CRITICAL CARE TIME   Please note that the withdrawal or failure to initiate urgent interventions for this patient would likely result in a life threatening deterioration or permanent disability. Accordingly this patient received 35 minutes of critical care time, excluding separately billable procedures. PAST MEDICAL HISTORY      has a past medical history of Diabetes mellitus ([facility] [address] DVT (deep venous thrombosis) ([facility] [address] (12/5/2011), Fall, and Hypertension.      EMERGENCY DEPARTMENT COURSE and DIFFERENTIAL DIAGNOSIS/MDM:   Vitals:    Vitals:    02/11/23 8246 02/11/23 0835 02/11/23 0944 02/11/23 1103   BP: 123/75   105/68   Pulse: (!) 126 (!) 106 96 91   Resp: 16  20 16   Temp: 96.9 °F (36.1 °C)      SpO2: 100%  98% 98%   Weight: 133 lb (60.3 kg)          Patient was given the following medications:  Medications   fentaNYL (SUBLIMAZE) injection 25 mcg (25 mcg IntraVENous Given 2/11/23 1000)   iopamidol (ISOVUE-370) 76 % injection 75 mL (75 mLs IntraVENous Given 2/11/23 1014)   fentaNYL (SUBLIMAZE) injection 25 mcg (25 mcg IntraVENous Given 2/11/23 1224)       ED Course as of 02/11/23 1535   Sat Feb 11, 2023   1003 716 [facility] [DOMO]   1031 D/w dr Rafael Welch working on retiring does not go into hospital anbymore just outpt recommends ortho trauma dr Gilda Canseco or dr Duane Black [DOMO]   1054 D/w dr Neil Basilio ortho trauma, will evaluate wants pt sent to [facility] ed [DOMO]   1055 D/w dr Ok Fisher accepts to [facility] ed [DOMO]   80 Pts son is here who is a neurosurgeon out of state, dr Elizabeth Gallegos requested temporary revoke of hospice of r ortho to fix, he requested dr Grecia Medina who requested dr Ghulam Chirinos. Pt to be transferred to [facility], agreeable w plan [DOMO]      ED Course User Index  [DOMO] Rik Pratt DO        [unfilled]    Chronic Conditions: Active Ambulatory Problems     Diagnosis Date Noted    Seizure-like activity ([facility] [address] 07/16/2022    Closed right hip fracture, initial encounter ([facility] [address] 02/11/2023     Resolved Ambulatory Problems     Diagnosis Date Noted    No Resolved Ambulatory Problems     Past Medical History:   Diagnosis Date    Diabetes mellitus ([facility] [address]     DVT (deep venous thrombosis) ([facility] [address] 12/5/2011    Fall     Hypertension          CONSULTS: (Who and What was discussed)  IP CONSULT TO ORTHOPEDIC SURGERY  IP CONSULT TO ORTHOPEDIC SURGERY    Discussion with Other Profesionals : Consultant dr Kandace Urias, dr Grecia Medina, dr Gautam Lyons, dr Elizabeth Gallegos    Social Determinants : dementia, nursing home pt    Records Reviewed : Outpatient Notes inn at [facility]    CC/HPI Summary, DDx, ED Course, and Reassessment:   80-year-old female who presents for fall at nursing home facility. She is a DNR and is on [facility]. Her son came in who is a neurosurgeon. He is here at bedside. She is complaining of right hip pain she had shortening and x-ray rotated right hip. Differential diagnosis includes hip dislocation of the prosthesis. Periprosthetic fracture and dislocation at the shaft.,  Intercranial hemorrhage, shoulder fracture dislocation patient's head CT showed no acute intercranial findings. Patient's extensive work-up showed comminuted periprosthetic fracture of the right hip. She also has probable hematoma in that area. Her hemoglobin stable. She is on anticoagulation. Imaging was reviewed with her son who is a neurosurgeon. Case discussed with orthopedics.   Patient to be sent ER to ER for orthopedic evaluation at [address] in the hospital.  Patient likely will need a revision done patient reevaluated pain stable vitals were stable. Patient transferred in stable condition        Disposition Considerations (tests considered but not done, Shared Decision Making, Pt Expectation of Test or Tx.): transfer to [facility] ed  ]      I am the Primary Clinician of Record. FINAL IMPRESSION      1. Periprosthetic fracture of hip, initial encounter    2. Closed head injury, initial encounter    3. Right hip pain    4. Acute pain of left shoulder    5. Anticoagulated    6. History of DVT of lower extremity          DISPOSITION/PLAN     DISPOSITION Decision To Transfer 02/11/2023 10:54:33 AM      PATIENT REFERRED TO:  No follow-up provider specified.     DISCHARGE MEDICATIONS:  Discharge Medication List as of 2/11/2023 12:56 PM          DISCONTINUED MEDICATIONS:  Discharge Medication List as of 2/11/2023 12:56 PM                 (Please note that portions of this note were completed with a voice recognition program.  Efforts were made to edit the dictations but occasionally words are mis-transcribed.)    Francis Hasa DO (electronically signed)           Francis Haas DO  02/11/23 2114

## 2023-02-11 NOTE — PLAN OF CARE

## 2023-02-11 NOTE — CONSULTS
Department of Orthopedic Surgery  Resident Consult Note          Reason for Consult: Periprosthetic hip fracture seen on imaging    HISTORY OF PRESENT ILLNESS:       Patient is a 80 y.o. female who presents with hip pain after a fall. She arrived to [facility] emergency department as a transfer from outside facility for periprosthetic hip fracture on the right seen on radiograph. Patient suffered from severe dementia with associated history of seizures, syncope and A-fib, DVT, LBP with sciatica, diabetes, hypertension, CHF, COPD and cancer. History was provided by EMS and ED attending and was informed that son would be in shortly to provide remaining history. It was reported patient had an unwitnessed fall at nursing home. Apprehensive to palpation to the right hip. Patient is only alert and oriented to person; unable to orient to time and place. Anticoagulation -course. Ambulatory status is unknown at this time. Previous Orthopedic Surgeon yes: Per chart review, patient saw Dr. Casey Maddox in , and Dr. Phuc Eisenberg in . Patient has a history of right total hip arthroplasty. Chart review, patient was admitted to hospice 2022.   Patient has DNR CC at outside facility     Tobacco use: Unknown  Alcohol use: Unknown  Illicit drug use: Unknown    Past Medical History:        Diagnosis Date    Diabetes mellitus ([facility]     DVT (deep venous thrombosis) ([facility] 2011    left leg    Fall     recent, falls x3    Hypertension      Past Surgical History:        Procedure Laterality Date     SECTION      x4    COLONOSCOPY      HIP SURGERY      right    KNEE SURGERY      left     Current Medications:   Current Facility-Administered Medications: [COMPLETED] dilTIAZem injection 10 mg, 10 mg, IntraVENous, Once **FOLLOWED BY** dilTIAZem 100 mg in sodium chloride 0.9 % 100 mL infusion ([facility]), 2.5-15 mg/hr, IntraVENous, Continuous  Allergies:  Sulfa antibiotics    Social History:   TOBACCO:   reports that she has never smoked. She has never used smokeless tobacco.  ETOH:   reports current alcohol use of about 1.0 standard drink per week. DRUGS:   reports no history of drug use. ACTIVITIES OF DAILY LIVING:    OCCUPATION:    Family History:   No family history on file. REVIEW OF SYSTEMS:  Able to obtain an accurate review of systems given patient's current neuro status  Respiratory, negative for wheezing, coughing and dyspnea  Cardiovascular: Negative for edema  Neurologic: Alert and oriented x1, pupil equal and reactive  Musculoskeletal: Positive for pain, bone pain, decreased range of motion    PHYSICAL EXAM:    VITALS:  BP (!) 147/98   Pulse 90   Resp 16   SpO2 95%   CONSTITUTIONAL:  awake, alert, cooperative, no apparent distress, and appears stated age  General appearance: alert, well appearing, and in no distress,  normal appearing weight  Mental status: alert, oriented to person, place, and time, normal mood, behavior, speech, dress, motor activity, and thought processes  Abdomen: soft, nondistended   Resp:   resp easy and unlabored, no audible wheezes note  Cardiac: distal pulses palpable, skin well perfused  Neurological: alert, oriented X3, normal speech, no focal findings or movement disorder noted, motor and sensory grossly normal bilaterally, normal muscle tone, no tremors, strength 5/5, normal gait and station  HEENT: normochephalic atraumatic, external ears and eyes normal, sclera normal, neck supple  Extremities:   peripheral pulses normal, no edema, redness or tenderness in the calves   Skin: normal coloration, no rashes or open wounds, no suspicious skin lesions noted  Psych: Affect euthymic   MUSCULOSKELETAL:  Right lower Extremity:  Knee immobilizer in place to the right knee. Resting in a slightly right decubitus position. Extremity appears slightly shortened. +Log roll and + Heel Strike  Patient expressed locally pain with palpation about the lateral aspect of the femur and hip.   Skin intact with no signs of degloving  Compartments soft and compressible, calf non-tender  +DP & PT pulses, Brisk Cap refill, Toes warm and perfused  Unable to assess sensory status given patient current exam.  Patient was observed performing +GS/TA/EHL. Secondary Exam:   bilateralUE: No obvious signs of trauma. No apprehension signifying pain with palpation to the shoulder, humerus, elbow, forearm, wrist and fingers. Compartment soft and compressible. Patient observed performing wrist flexion extension, elbow flexion extension, slight forward flexion and abduction at the shoulder. Unable to assess sensory status given patient current exam.  Distal pulses palpable. rightLE: No obvious signs of trauma. No apprehension signifying pain with palpation at the hip, femur, knee, tibia, ankle and foot. Patient observed ranging knee to approximately 40 degrees. Patient seen performing plantarflexion, dorsiflexion. Distal pulses palpable. Unable to assess sensory status at this time. Compartments soft and compressible    Pelvis: -TTP, -Log roll, -Heel strike     DATA:    CBC:   Lab Results   Component Value Date/Time    WBC 13.7 02/11/2023 08:36 AM    RBC 3.92 02/11/2023 08:36 AM    HGB 12.0 02/11/2023 08:36 AM    HCT 37.1 02/11/2023 08:36 AM    MCV 94.6 02/11/2023 08:36 AM    MCH 30.6 02/11/2023 08:36 AM    MCHC 32.3 02/11/2023 08:36 AM    RDW 13.0 02/11/2023 08:36 AM     02/11/2023 08:36 AM    MPV 9.3 02/11/2023 08:36 AM     PT/INR:    Lab Results   Component Value Date/Time    PROTIME 16.7 02/11/2023 08:36 AM    PROTIME 15.6 12/07/2011 04:37 AM    INR 1.5 02/11/2023 08:36 AM     Lactic Acid :   Lab Results   Component Value Date/Time    LACTA 2.5 02/11/2023 08:36 AM       Radiology Review:  02/11/23 - XR AP pelvis right hip demonstrating right prosthetic hip fracture at the level of the femoral component. Total hip arthroplasty implant in place appears to be stable.   AP radiograph appears to demonstrate segmented fracture at the lateral cortex minimally displaced. No extension distal to the stem. Poor bone quality. Carmen classification C      IMPRESSION:   Closed, Right periprosthetic hip fracture     PLAN:  Tentatively plan for surgery with Dr. Daphne Salmon on 2/12/2023  We will discuss with patient and family regarding definitive management. Currently patient is not an ideal candidate for surgery however if she does have a periprosthetic hip fracture. We will get a better understanding of desired outcome by speaking with family and will proceed as indicated. NPO active at midnight, Needs medical assessment for surgery  R LE Non-weight bearing  Pre-op Labs and Imaging Completed  Pain control IV/PO  Hold Anticoagulation, patient can remain on pneumatic compression device  Treatment consent  All questions and concerns from patient and family addressed, and patient is agreeable to plan. Review and discuss with Dr. Daphne Salmon        I have seen and evaluated the patient and agree with the above assessment on today's visit. I have performed the key components of the history and physical examination and concur completely with the findings and plans as documented. Agree with ROS, examination, FMH, PMH, PSH, SocHx, and allergies as above. Patient physically seen and examined. Patient is a very pleasant 80-year-old female who is recently moved to another nursing home and fell suffering a right periprosthetic hip fracture. She has a history of a revision that hip as well. She is accompanied by her 3 sons when I saw her this morning. One of her sons is a neurosurgeon. We discussed in detail the fact that they had her on hospice and palliative care but he did not want her suffering in pain. I think this is a very reasonable point. I showed him the x-rays with him over the fact that her canal is very large and ectatic.   She did fracture the proximal portion of her femur as well as the stem appears loose in the canal.  I do not think a normal press-fit stem would get much bite in her canal due to the thin nature of the bone and the fact that it is wide nearly the whole way down the femur. She would require a large cement load with a revision stem which could potentially drop her pressure and be fatal.  We talked about this in detail. I do want to see how she does mobilizing. We will let her rest for a day get her off of her Eliquis which is for a DVT in her left lower extremity placed on Lovenox. We will see how comfortable she is getting a chair early this week if she is suffering significantly we will lean towards operative intervention which again I discussed at length with the family would be very risky at her age and for how big of the surgery it is. They understand this and agree with our treatment plan of trying to treat it conservatively initially if able. Past Medical History:   Diagnosis Date    Diabetes mellitus ([facility]     DVT (deep venous thrombosis) ([facility] 2011    left leg    Fall     recent, falls x3    Hypertension      Past Surgical History:   Procedure Laterality Date     SECTION      x4    COLONOSCOPY      HIP SURGERY      right    KNEE SURGERY      left     No family history on file. Social History     Tobacco Use    Smoking status: Never    Smokeless tobacco: Never   Substance Use Topics    Alcohol use: Yes     Alcohol/week: 1.0 standard drink     Types: 1 Glasses of wine per week     Comment: rare    Drug use: No     Allergies   Allergen Reactions    Sulfa Antibiotics Other (See Comments)     unknown             Physical Examination:   General appearance: alert, ill appearing, and in no distress,  normal appearing weight.  No visible signs of trauma   Mental status: Responds to pain cannot answer any questions due to her dementia  Abdomen: soft, nondistended  Resp:   resp easy and unlabored, no audible wheezes note, normal symmetrical expansion of both hemithoraces  Cardiac: distal pulses palpable, skin and extremities well perfused  Neurological: alert, oriented X3, normal speech, no focal findings or movement disorder noted, motor and sensory grossly normal bilaterally, normal muscle tone, no tremors  HEENT: normochephalic atraumatic, external ears and eyes normal, sclera normal, neck supple, no nasal discharge. Extremities:   peripheral pulses normal, no edema, redness or tenderness in the calves   Skin: normal coloration, no rashes or open wounds, no suspicious skin lesions noted  Psych: Affect euthymic   Musculoskeletal:   Extremity:  Agree with examination of right lower extremity. Patient with skin intact compartment soft compressible. Plan on attempted conservative treatment. We will see how she does getting up in a chair earlier this week may need to switch gears to operative intervention depending on how she does. We will keep family in the loop as far as communication. ELECTRONICALLY signed by:    Michelle Nino MD  2/12/23   This is been dictated utilizing voice recognition software. All efforts have been made to make the note accurate although inadvertent errors may be present.

## 2023-02-11 NOTE — ANESTHESIA PRE PROCEDURE
Department of Anesthesiology  Preprocedure Note       Name:  Caitlin Starr   Age:  80 y.o.  :  5/10/1931                                          MRN:  [id number]         Date:  2023      Surgeon: Christopher Gibson):  Alba Thacker MD    Procedure: Procedure(s):  RIGHT HIP OPEN REDUCTION INTERNAL FIXATION - RIGHT PERIPROSTHETIC HIP FRACTURE, LATERAL POSITION, BEAN BAG, WITH POSSIBLE REVISION - WANTS TO FOLLOW  HIP TOTAL ARTHROPLASTY REVISION    Medications prior to admission:   Prior to Admission medications    Medication Sig Start Date End Date Taking? Authorizing Provider   polyethylene glycol (GLYCOLAX) 17 GM/SCOOP powder Take 17 g by mouth daily   Yes Historical Provider, MD   levothyroxine (SYNTHROID) 25 MCG tablet Take 25 mcg by mouth Daily   Yes Historical Provider, MD   senna (SENOKOT) 8.8 MG/5ML SYRP syrup Take 10 mLs by mouth daily   Yes Historical Provider, MD   LORazepam (ATIVAN) 0.5 MG tablet Take 0.5 mg by mouth every 6 hours as needed for Anxiety. Yes Historical Provider, MD   amiodarone (PACERONE) 100 MG tablet Take 1 tablet by mouth in the morning. Patient not taking: Reported on 2023   Don Murphy MD   losartan (COZAAR) 100 MG tablet Take 1 tablet by mouth in the morning. Patient not taking: Reported on 2023   Don Murphy MD   docusate sodium (COLACE) 150 MG/15ML liquid Take 10 mLs by mouth in the morning. Patient not taking: Reported on 2023   Don Murphy MD   levETIRAcetam (KEPPRA) 500 MG tablet Take 1 tablet by mouth in the morning and 1 tablet before bedtime.   Patient not taking: Reported on 2023   Don Murphy MD   metFORMIN (GLUCOPHAGE) 500 MG tablet Take 500 mg by mouth daily (with breakfast)  Patient not taking: Reported on 2023    Historical Provider, MD       Current medications:    Current Facility-Administered Medications   Medication Dose Route Frequency Provider Last Rate Last Admin    morphine (PF) injection 2 mg  2 mg IntraVENous Q4H PRN Rony Mckinney MD   2 mg at 02/11/23 1723    acetaminophen (TYLENOL) tablet 500 mg  500 mg Oral Q4H PRN Rony Mckinney MD        HYDROcodone-acetaminophen (NORCO) 5-325 MG per tablet 1 tablet  1 tablet Oral Q6H PRN Rony Mckinney MD        levothyroxine (SYNTHROID) tablet 25 mcg  25 mcg Oral Daily Rony Mckinney MD   25 mcg at 02/11/23 1723    LORazepam (ATIVAN) tablet 0.5 mg  0.5 mg Oral Q6H PRN Rony Mckinney MD        sennosides (SENOKOT) 8.8 MG/5ML syrup 10 mL  10 mL Oral Daily Rony Mckinney MD   10 mL at 02/11/23 1827    insulin lispro (HUMALOG) injection vial 0-8 Units  0-8 Units SubCUTAneous TID WC Rony Mckinney MD        insulin lispro (HUMALOG) injection vial 0-4 Units  0-4 Units SubCUTAneous Nightly Rony Mckinnye MD        glucose chewable tablet 16 g  4 tablet Oral PRN Rony Mckinney MD        dextrose bolus 10% 125 mL  125 mL IntraVENous PRN Rony Mckinney MD        Or    dextrose bolus 10% 250 mL  250 mL IntraVENous PRN Rony Mckinney MD        glucagon injection 1 mg  1 mg SubCUTAneous PRN Rony Mckinney MD        dextrose 10 % infusion   IntraVENous Continuous PRPJ Mckinney MD        dextrose 5 % and 0.45 % sodium chloride infusion   IntraVENous Continuous Rony Mckinney MD 75 mL/hr at 02/11/23 1746 New Bag at 02/11/23 1746    trimethobenzamide (TIGAN) injection 100 mg  100 mg IntraMUSCular Q8H PRN Rony Mckinney MD        polyethylene glycol (GLYCOLAX) packet 17 g  17 g Oral Daily Rony Mckinney MD   17 g at 02/11/23 1723    amiodarone (CORDARONE) tablet 200 mg  200 mg Oral Daily Martin Gaines DO   200 mg at 02/11/23 1826       Allergies:     Allergies   Allergen Reactions    Sulfa Antibiotics Other (See Comments)     unknown       Problem List:    Patient Active Problem List   Diagnosis Code    Seizure-like activity ([facility] R56.9    [facility] S72.001A       Past Medical History: Diagnosis Date    Diabetes mellitus ([facility]     DVT (deep venous thrombosis) ([facility] 2011    left leg    Fall     recent, falls x3    Hypertension        Past Surgical History:        Procedure Laterality Date     SECTION      x4    COLONOSCOPY      HIP SURGERY      right    KNEE SURGERY      left       Social History:    Social History     Tobacco Use    Smoking status: Never    Smokeless tobacco: Never   Substance Use Topics    Alcohol use:  Yes     Alcohol/week: 1.0 standard drink     Types: 1 Glasses of wine per week     Comment: rare                                Counseling given: Not Answered      Vital Signs (Current):   Vitals:    23 1338 23 1459 23 1530 23 1723   BP: (!) 161/78 (!) 147/98 136/60    Pulse: 95 90 95    Resp: 13    Temp:   36.4 °C (97.5 °F)    TempSrc:   Oral    SpO2: 100% 95% 98%                                               BP Readings from Last 3 Encounters:   23 136/60   23 105/68   22 (!) 149/90       NPO Status:                                                                                 BMI:   Wt Readings from Last 3 Encounters:   23 133 lb (60.3 kg)   22 133 lb (60.3 kg)   22 133 lb (60.3 kg)     There is no height or weight on file to calculate BMI.    CBC:   Lab Results   Component Value Date/Time    WBC 13.7 2023 08:36 AM    RBC 3.92 2023 08:36 AM    HGB 12.0 2023 08:36 AM    HCT 37.1 2023 08:36 AM    MCV 94.6 2023 08:36 AM    RDW 13.0 2023 08:36 AM     2023 08:36 AM       CMP:   Lab Results   Component Value Date/Time     2023 08:36 AM    K 3.7 2023 08:36 AM    K 3.3 2022 01:18 PM     2023 08:36 AM    CO2 23 2023 08:36 AM    BUN 24 2023 08:36 AM    CREATININE 0.7 2023 08:36 AM    GFRAA >60 2022 03:30 PM    LABGLOM >60 2023 08:36 AM    GLUCOSE 194 2023 08:36 AM    GLUCOSE 287 12/05/2011 10:25 PM    PROT 6.7 02/11/2023 08:36 AM    CALCIUM 9.3 02/11/2023 08:36 AM    BILITOT 1.1 02/11/2023 08:36 AM    ALKPHOS 94 02/11/2023 08:36 AM    AST 27 02/11/2023 08:36 AM    ALT 16 02/11/2023 08:36 AM       POC Tests: No results for input(s): POCGLU, POCNA, POCK, POCCL, POCBUN, POCHEMO, POCHCT in the last 72 hours. Coags:   Lab Results   Component Value Date/Time    PROTIME 16.7 02/11/2023 08:36 AM    PROTIME 15.6 12/07/2011 04:37 AM    INR 1.5 02/11/2023 08:36 AM    APTT 28.8 04/11/2017 09:46 PM       HCG (If Applicable): No results found for: PREGTESTUR, PREGSERUM, HCG, HCGQUANT     ABGs: No results found for: PHART, PO2ART, QJO1ZAW, PPJ6HZN, BEART, R9JRDZPG     Type & Screen (If Applicable):  No results found for: LABABO, LABRH    Drug/Infectious Status (If Applicable):  No results found for: HIV, HEPCAB    COVID-19 Screening (If Applicable):   Lab Results   Component Value Date/Time    COVID19 Not Detected 02/11/2023 08:36 AM       ECHO-  Summary   Left ventricle grossly normal in size. Normal LV segmental wall motion. Normal left ventricular wall thickness. Estimated left ventricular ejection fraction is 70±5%. Does not meet 50% diagnostic criteria for diastolic dysfunction. The LAESV Index is >34 ml/m2. Normal right ventricular size and function. TAPSE is low normal   Trace aortic regurgitation is noted. Mild aortic stenosis is present. No evidence to suggest pulmonary hypertension. Anesthesia Evaluation  Patient summary reviewed and Nursing notes reviewed no history of anesthetic complications:   Airway:        Comment: BRYON patient not following verbal commands.       Dental:      Comment: BRYON patient not following verbal commands    Pulmonary: breath sounds clear to auscultation  (+) COPD:                             Cardiovascular:    (+) hypertension:, dysrhythmias: atrial fibrillation, CHF:,       ECG reviewed  Rhythm: regular  Rate: normal  Echocardiogram reviewed               ROS comment: EKG  Normal sinus rhythm  Normal ECG  When compared with ECG of 17-JUL-2022 17:35,           Neuro/Psych:   (+) seizures:, neuromuscular disease (sciatica):, dementia            GI/Hepatic/Renal:             Endo/Other:    (+) Diabetes, blood dyscrasia: anticoagulation therapy:., malignancy/cancer. Abdominal:             Vascular:   + DVT, . Other Findings:           Anesthesia Plan      general     ASA 3       Induction: intravenous. MIPS: Postoperative opioids intended and Prophylactic antiemetics administered. Plan discussed with CRNA and attending. Patient unable to participate in discussion/assessment. Noted history of dementia on chart.      Leilani Yoder, RN   2/11/2023

## 2023-02-11 NOTE — LETTER
[place] Department Medicaid  CERTIFICATION OF NECESSITY  FOR NON-EMERGENCY TRANSPORTATION   BY GROUND AMBULANCE      Individual Information   1. Name: Juan Ayala 2. [place] Medicaid Billing Number:    3. Address: [address]      Transportation Provider Information   4. Provider Name:    5. [place] Medicaid Provider Number:  National Provider Identifier (NPI):      Certification  7. Criteria:  During transport, this individual requires:  [x] Medical treatment or continuous     supervision by an EMT. [] The administration or regulation of oxygen by another person. [] Supervised protective restraint. 8. Period Beginning Date:    5. Length  [x] Not more than 1 day(s)  [] One Year     Additional Information Relevant to Certification   10. Comments or Explanations, If Necessary or Appropriate   TRUDI ZIMMERMAN OF [address] [place] [address]     Certifying Practitioner Information   11. Name of Practitioner: Allie Bailon MD   12. [place] Medicaid Provider Number, If Applicable:  [address] Provider Identifier (NPI):      Signature Information   14. Date of Signature:  13. Name of Person Signin. Signature and Professional Designation:      [facility] [id number]  Rev. 2015    [address] [place] [address] Encounter Date/Time: 2023 [address] [facility] Account: [de-identified]    MRN: [id number]    Patient: Juan Ayala    Contact Serial #: [id number]      ENCOUNTER          Patient Class: I Private Enc?   No Unit  BDBobbye [facility] 3389/0434-A   Hospital Service: Med/Surg   Encounter DX: Closed fracture of right*   ADM Provider: Allie Bailon MD   Procedure:     ATT Provider: Allie Bailon MD   REF Provider:        Admission DX: Closed fracture of right hip, initial encounter [facility]), Closed right hip fracture, initial encounter [facility]), Atrial fibrillation, unspecified type ([facility] and DX codes: S72.001A, S72.001A, I48.91      PATIENT  Name: Juan Ayala : 5/10/1931 (89 yrs)   Address: [facility] Sex: Female   City: [address] [place] [address]         Marital Status:    Employer: RETIRED         Quaker: Sabianism   Primary Care Provider: DO Terry Green Phone: [phone]   EMERGENCY CONTACT   Contact Name Legal Guardian? Relationship to Patient Home Phone Work Phone   1. Song Lilly  2. MaxxKunal No    Child  Spouse [phone]              GUARANTOR            Guarantor: Arcadio Hagan     : 5/10/1931   Address: [place] [address] Sex: Female   [place] [address]     Relation to Patient: Self       Home Phone: [phone]   Guarantor ID: [id number]       Work Phone:     Guarantor Employer: RETIRED         Status: RETIRED      COVERAGE  PRIMARY INSURANCE   Payor: [employer] MEDICARE Plan: Juliano Monge MEDICA*   Payor Address: [facility] [id number]       Group Number: [id number] Insurance Type: [address] Name: Fercho Beltran : 05/10/1931   Subscriber ID: [id number] Pat. Rel. to Sub: Self   SECONDARY INSURANCE   Payor:   Plan:     Payor Address:  ,           Group Number:   Insurance Type:     Subscriber Name:   Subscriber :     Subscriber ID:   Pat.  Rel. to Sub:        CSN: [id number]

## 2023-02-11 NOTE — PROGRESS NOTES
@1724 access center notified of transfer to [facility] ([facility]) ED  Fall/ R periprostetic hip fx  Dr. Pao Winston previously spoke with / ortho requesting transfer  @4626 Dr. Pao Winston spoke with Dr. Macy Licona accepting ER Physician  @1100 [place] ETA 90 minutes @ [phone]

## 2023-02-12 ENCOUNTER — ANESTHESIA (OUTPATIENT)
Dept: OPERATING ROOM | Age: 88
End: 2023-02-12

## 2023-02-12 PROBLEM — I48.91 ATRIAL FIBRILLATION (HCC): Status: ACTIVE | Noted: 2023-02-12

## 2023-02-12 PROBLEM — I82.502 CHRONIC DEEP VEIN THROMBOSIS (DVT) OF LEFT LOWER EXTREMITY (HCC): Status: ACTIVE | Noted: 2023-02-12

## 2023-02-12 LAB
ALBUMIN SERPL-MCNC: 3.6 G/DL (ref 3.5–5.2)
ALP BLD-CCNC: 80 U/L (ref 35–104)
ALT SERPL-CCNC: 13 U/L (ref 0–32)
ANION GAP SERPL CALCULATED.3IONS-SCNC: 8 MMOL/L (ref 7–16)
AST SERPL-CCNC: 21 U/L (ref 0–31)
BASOPHILS ABSOLUTE: 0.04 E9/L (ref 0–0.2)
BASOPHILS RELATIVE PERCENT: 0.6 % (ref 0–2)
BILIRUB SERPL-MCNC: 1.5 MG/DL (ref 0–1.2)
BUN BLDV-MCNC: 22 MG/DL (ref 6–23)
CALCIUM SERPL-MCNC: 8.4 MG/DL (ref 8.6–10.2)
CHLORIDE BLD-SCNC: 108 MMOL/L (ref 98–107)
CO2: 25 MMOL/L (ref 22–29)
CREAT SERPL-MCNC: 0.7 MG/DL (ref 0.5–1)
EOSINOPHILS ABSOLUTE: 0.02 E9/L (ref 0.05–0.5)
EOSINOPHILS RELATIVE PERCENT: 0.3 % (ref 0–6)
GFR SERPL CREATININE-BSD FRML MDRD: >60 ML/MIN/1.73
GLUCOSE BLD-MCNC: 193 MG/DL (ref 74–99)
HCT VFR BLD CALC: 27.6 % (ref 34–48)
HEMOGLOBIN: 9.2 G/DL (ref 11.5–15.5)
IMMATURE GRANULOCYTES #: 0.02 E9/L
IMMATURE GRANULOCYTES %: 0.3 % (ref 0–5)
LYMPHOCYTES ABSOLUTE: 1.51 E9/L (ref 1.5–4)
LYMPHOCYTES RELATIVE PERCENT: 24.2 % (ref 20–42)
MCH RBC QN AUTO: 29.8 PG (ref 26–35)
MCHC RBC AUTO-ENTMCNC: 33.3 % (ref 32–34.5)
MCV RBC AUTO: 89.3 FL (ref 80–99.9)
METER GLUCOSE: 206 MG/DL (ref 74–99)
METER GLUCOSE: 208 MG/DL (ref 74–99)
METER GLUCOSE: 210 MG/DL (ref 74–99)
METER GLUCOSE: 240 MG/DL (ref 74–99)
MONOCYTES ABSOLUTE: 0.5 E9/L (ref 0.1–0.95)
MONOCYTES RELATIVE PERCENT: 8 % (ref 2–12)
NEUTROPHILS ABSOLUTE: 4.14 E9/L (ref 1.8–7.3)
NEUTROPHILS RELATIVE PERCENT: 66.6 % (ref 43–80)
PDW BLD-RTO: 13.4 FL (ref 11.5–15)
PLATELET # BLD: 214 E9/L (ref 130–450)
PMV BLD AUTO: 9.6 FL (ref 7–12)
POTASSIUM SERPL-SCNC: 3.5 MMOL/L (ref 3.5–5)
RBC # BLD: 3.09 E12/L (ref 3.5–5.5)
SODIUM BLD-SCNC: 141 MMOL/L (ref 132–146)
TOTAL PROTEIN: 5.9 G/DL (ref 6.4–8.3)
WBC # BLD: 6.2 E9/L (ref 4.5–11.5)

## 2023-02-12 PROCEDURE — 99223 1ST HOSP IP/OBS HIGH 75: CPT | Performed by: SURGERY

## 2023-02-12 PROCEDURE — 82962 GLUCOSE BLOOD TEST: CPT

## 2023-02-12 PROCEDURE — 2580000003 HC RX 258: Performed by: INTERNAL MEDICINE

## 2023-02-12 PROCEDURE — 6360000002 HC RX W HCPCS: Performed by: ORTHOPAEDIC SURGERY

## 2023-02-12 PROCEDURE — 85025 COMPLETE CBC W/AUTO DIFF WBC: CPT

## 2023-02-12 PROCEDURE — 2500000003 HC RX 250 WO HCPCS: Performed by: INTERNAL MEDICINE

## 2023-02-12 PROCEDURE — 6370000000 HC RX 637 (ALT 250 FOR IP): Performed by: INTERNAL MEDICINE

## 2023-02-12 PROCEDURE — 36415 COLL VENOUS BLD VENIPUNCTURE: CPT

## 2023-02-12 PROCEDURE — 99222 1ST HOSP IP/OBS MODERATE 55: CPT | Performed by: ORTHOPAEDIC SURGERY

## 2023-02-12 PROCEDURE — 80053 COMPREHEN METABOLIC PANEL: CPT

## 2023-02-12 PROCEDURE — 2140000000 HC CCU INTERMEDIATE R&B

## 2023-02-12 RX ORDER — ENOXAPARIN SODIUM 100 MG/ML
30 INJECTION SUBCUTANEOUS 2 TIMES DAILY
Status: DISCONTINUED | OUTPATIENT
Start: 2023-02-12 | End: 2023-02-12 | Stop reason: ALTCHOICE

## 2023-02-12 RX ORDER — ENOXAPARIN SODIUM 100 MG/ML
60 INJECTION SUBCUTANEOUS 2 TIMES DAILY
Status: DISCONTINUED | OUTPATIENT
Start: 2023-02-12 | End: 2023-02-19

## 2023-02-12 RX ADMIN — ENOXAPARIN SODIUM 60 MG: 100 INJECTION SUBCUTANEOUS at 21:00

## 2023-02-12 RX ADMIN — MORPHINE SULFATE 2 MG: 2 INJECTION, SOLUTION INTRAMUSCULAR; INTRAVENOUS at 14:12

## 2023-02-12 RX ADMIN — AMIODARONE HYDROCHLORIDE 200 MG: 200 TABLET ORAL at 08:26

## 2023-02-12 RX ADMIN — ENOXAPARIN SODIUM 60 MG: 100 INJECTION SUBCUTANEOUS at 12:24

## 2023-02-12 RX ADMIN — DEXTROSE AND SODIUM CHLORIDE: 5; 450 INJECTION, SOLUTION INTRAVENOUS at 08:39

## 2023-02-12 RX ADMIN — MORPHINE SULFATE 2 MG: 2 INJECTION, SOLUTION INTRAMUSCULAR; INTRAVENOUS at 19:02

## 2023-02-12 RX ADMIN — INSULIN LISPRO 2 UNITS: 100 INJECTION, SOLUTION INTRAVENOUS; SUBCUTANEOUS at 08:26

## 2023-02-12 RX ADMIN — SENNOSIDES 10 ML: 8.8 SYRUP ORAL at 08:26

## 2023-02-12 RX ADMIN — INSULIN LISPRO 2 UNITS: 100 INJECTION, SOLUTION INTRAVENOUS; SUBCUTANEOUS at 17:29

## 2023-02-12 RX ADMIN — LEVOTHYROXINE SODIUM 25 MCG: 0.05 TABLET ORAL at 06:22

## 2023-02-12 RX ADMIN — INSULIN LISPRO 2 UNITS: 100 INJECTION, SOLUTION INTRAVENOUS; SUBCUTANEOUS at 13:07

## 2023-02-12 RX ADMIN — POLYETHYLENE GLYCOL 3350 17 G: 17 POWDER, FOR SOLUTION ORAL at 08:26

## 2023-02-12 RX ADMIN — MORPHINE SULFATE 2 MG: 2 INJECTION, SOLUTION INTRAMUSCULAR; INTRAVENOUS at 08:48

## 2023-02-12 ASSESSMENT — PAIN DESCRIPTION - LOCATION
LOCATION: HIP;LEG

## 2023-02-12 ASSESSMENT — PAIN SCALES - WONG BAKER: WONGBAKER_NUMERICALRESPONSE: 0

## 2023-02-12 ASSESSMENT — PAIN DESCRIPTION - ORIENTATION
ORIENTATION: RIGHT

## 2023-02-12 ASSESSMENT — PAIN SCALES - GENERAL
PAINLEVEL_OUTOF10: 0
PAINLEVEL_OUTOF10: 0
PAINLEVEL_OUTOF10: 10
PAINLEVEL_OUTOF10: 8
PAINLEVEL_OUTOF10: 7

## 2023-02-12 ASSESSMENT — PAIN - FUNCTIONAL ASSESSMENT
PAIN_FUNCTIONAL_ASSESSMENT: PREVENTS OR INTERFERES WITH ALL ACTIVE AND SOME PASSIVE ACTIVITIES

## 2023-02-12 ASSESSMENT — PAIN DESCRIPTION - DESCRIPTORS
DESCRIPTORS: SORE;DISCOMFORT;NAGGING
DESCRIPTORS: DISCOMFORT;SORE;NAGGING
DESCRIPTORS: DISCOMFORT;NAGGING;SORE

## 2023-02-12 NOTE — PROGRESS NOTES
Due to patient's current comorbidities, adequate time is required for medical optimization. Patient will be scheduled for surgical intervention on 2/13/2023. Preoperative orders will be modified to reflect this change. Patient okay for diet on 2/12/2023. Will notify son who is the POA regarding change.      NPO diet effective on 2/13/2023 0015

## 2023-02-12 NOTE — ACP (ADVANCE CARE PLANNING)
Advance Care Planning   Healthcare Decision Maker:      Click here to complete Healthcare Decision Makers including selection of the Healthcare Decision Maker Relationship (ie \"Primary\").

## 2023-02-12 NOTE — H&P
MD Magali Lopez  Internal medicine   History and Physical      CHIEF COMPLAINT: Patient fell at nursing facility      HISTORY OF PRESENT ILLNESS:    Patient was seen on the floor earlier this morning at the main campus of [facility] with the ER physician at the time of admission  Data reviewed in detail with her 3 sons at bedside and a registered nurse  Patient currently living in a nursing facility due to advanced dementia  Presented to ER after sustaining a mechanical fall that resulted in right hip periprosthetic fracture  Admitted for further management  Patient was on Eliquis for atrial fibrillation paroxysmal and recent onset of DVT right lower extremity  Pain control adequate      Past Medical History:    Past Medical History:   Diagnosis Date    Diabetes mellitus ([facility] [address]     DVT (deep venous thrombosis) ([facility] [address] 2011    left leg    Fall     recent, falls x3    Hypertension        Past Surgical History:    Past Surgical History:   Procedure Laterality Date     SECTION      x4    COLONOSCOPY      HIP SURGERY      right    KNEE SURGERY      left       Medications Prior to Admission:    Medications Prior to Admission: polyethylene glycol (GLYCOLAX) 17 GM/SCOOP powder, Take 17 g by mouth daily  levothyroxine (SYNTHROID) 25 MCG tablet, Take 25 mcg by mouth Daily  senna (SENOKOT) 8.8 MG/5ML SYRP syrup, Take 10 mLs by mouth daily  LORazepam (ATIVAN) 0.5 MG tablet, Take 0.5 mg by mouth every 6 hours as needed for Anxiety. [DISCONTINUED] insulin glargine (LANTUS) 100 UNIT/ML injection vial, Inject 18 Units into the skin nightly  amiodarone (PACERONE) 100 MG tablet, Take 1 tablet by mouth in the morning. (Patient not taking: Reported on 2023)  losartan (COZAAR) 100 MG tablet, Take 1 tablet by mouth in the morning. (Patient not taking: Reported on 2023)  docusate sodium (COLACE) 150 MG/15ML liquid, Take 10 mLs by mouth in the morning.  (Patient not taking: Reported on 2/11/2023)  levETIRAcetam (KEPPRA) 500 MG tablet, Take 1 tablet by mouth in the morning and 1 tablet before bedtime. (Patient not taking: Reported on 2/11/2023)  [DISCONTINUED] apixaban (ELIQUIS) 2.5 MG TABS tablet, Take 1 tablet by mouth in the morning and 1 tablet before bedtime. metFORMIN (GLUCOPHAGE) 500 MG tablet, Take 500 mg by mouth daily (with breakfast) (Patient not taking: Reported on 2/11/2023)    Allergies:    Sulfa antibiotics    Social History:    reports that she has never smoked. She has never used smokeless tobacco. She reports current alcohol use of about 1.0 standard drink per week. She reports that she does not use drugs. Family History:   family history is not on file. REVIEW OF SYSTEMS:  As above in the HPI, otherwise negative    PHYSICAL EXAM:    Vitals:  BP (!) 125/58   Pulse 87   Temp 98.5 °F (36.9 °C) (Axillary)   Resp 18   SpO2 98%     General:  Awake, alert, disoriented  Chronically ill looking  Thin built  HEENT:  Normocephalic, atraumatic. Pupils equal, round, reactive to light. No scleral icterus. No conjunctival injection No nasal discharge. Neck:  Supple  Heart: Regular rate and rhythm with frequent ectopy  Monitor showing PACs with sinus rhythm  Lungs:  CTA bilaterally, bilat symmetrical expansion, no wheeze, rales, or rhonchi  Abdomen:   Bowel sounds present, soft, nontender, no masses, no organomegaly, no peritoneal signs  Extremities:  No clubbing, cyanosis, or edema  Right lower extremity externally rotated with hip tenderness  Skin:  Warm and dry, no open lesions or rash  Neuro:  Cranial nerves 2-12 intact, no focal deficits  Breast: deferred  Rectal: deferred  Genitalia:  deferred    LABS:  Data reviewed      ASSESSMENT:      Principal Problem:    Closed fracture of right hip (HCC)  Periprosthetic  Mechanical fall  DVT right lower extremity 2 weeks ago  Paroxysmal atrial fibrillation currently in sinus rhythm  Advanced dementia        PLAN:  Admit  Pain control  Hold anticoagulants in anticipation of surgical intervention  Consider IVC filter placement preoperatively  Consult vascular surgery  Cardiology consult for preop assessment  Questions answered to the family satisfaction    Balbina Jones MD  3:52 PM  2/12/2023

## 2023-02-12 NOTE — PROGRESS NOTES
Orthopedic surgery notified regarding patient's son who is the POA wanted to speak with provider. Son is a neurosurgeon in [facility] [employer]. He inquires on prognosis and OR availability. Thorough discussion was had with patient's son surrounding previous medical history and circumstances surrounding hospice admission in August 2022. Additional past medical history was provided. Risk and benefits were also discussed with patient's son who would like to proceed with operative intervention. He is aware that surgical risk may be high given patient's age and comorbidities status. Ultimately, the family would like patient to have pain relief and ambulate during her remaining years. We will tentatively plan for surgery tomorrow 2/12/2023. Patient will be made n.p.o. effective at midnight. Preoperative orders will be made.

## 2023-02-12 NOTE — PROGRESS NOTES
Department of Orthopedic Surgery  Resident Progress Note      SUBJECTIVE  Patient seen and examined at bedside resting comfortably. Patient answered questions appropriately intermittently. Patient reports there is pain in the right hip. Follows commands intermittently. OBJECTIVE    Physical    VITALS:  /65   Pulse 84   Temp 98 °F (36.7 °C) (Temporal)   Resp 15   SpO2 98%     MUSCULOSKELETAL:   Right lower Extremity:  Knee immobilizer in place to the right knee. Resting in a slightly right decubitus position. Extremity appears slightly shortened. +Log roll and + Heel Strike  Patient expressed locally pain with palpation about the lateral aspect of the femur and hip. Skin intact with no signs of degloving  Compartments soft and compressible, calf non-tender  +DP & PT pulses, Brisk Cap refill, Toes warm and perfused  Unable to assess sensory status given patient current exam.  Patient was observed performing +GS/TA/EHL. Data    CBC:   Lab Results   Component Value Date/Time    WBC 6.2 02/12/2023 04:56 AM    RBC 3.09 02/12/2023 04:56 AM    HGB 9.2 02/12/2023 04:56 AM    HCT 27.6 02/12/2023 04:56 AM    MCV 89.3 02/12/2023 04:56 AM    MCH 29.8 02/12/2023 04:56 AM    MCHC 33.3 02/12/2023 04:56 AM    RDW 13.4 02/12/2023 04:56 AM     02/12/2023 04:56 AM    MPV 9.6 02/12/2023 04:56 AM     PT/INR:    Lab Results   Component Value Date/Time    PROTIME 18.5 02/11/2023 08:36 PM    PROTIME 15.6 12/07/2011 04:37 AM    INR 1.7 02/11/2023 08:36 PM       Labs  No results for input(s): BC, BLOODCULT2 in the last 72 hours. No results for input(s): CXSURG in the last 72 hours. ASSESSMENT  Closed, right periprosthetic hip fracture    PLAN    PLAN    Continue nonweightbearing right lower extremity  Tentatively plan for surgery tomorrow 2/13/2023  NPO diet effective at midnight  Preoperative orders made  Anticoagulation prior to surgery.   Pneumatic compressive devices can be applied to the contralateral leg. Appreciate medical optimatization.

## 2023-02-12 NOTE — PLAN OF CARE
Problem: Chronic Conditions and Co-morbidities  Goal: Patient's chronic conditions and co-morbidity symptoms are monitored and maintained or improved  2/11/2023 1624 by Belem Jones RN  Outcome: Progressing     Problem: Discharge Planning  Goal: Discharge to home or other facility with appropriate resources  2/12/2023 0134 by Aurelia Andrew RN  Outcome: Progressing  2/11/2023 1624 by Belem Jones RN  Outcome: Progressing     Problem: Skin/Tissue Integrity  Goal: Absence of new skin breakdown  Description: 1. Monitor for areas of redness and/or skin breakdown  2. Assess vascular access sites hourly  3. Every 4-6 hours minimum:  Change oxygen saturation probe site  4. Every 4-6 hours:  If on nasal continuous positive airway pressure, respiratory therapy assess nares and determine need for appliance change or resting period.   2/11/2023 1624 by Belem Jones RN  Outcome: Progressing     Problem: Safety - Adult  Goal: Free from fall injury  2/12/2023 0134 by Aurelia Andrew RN  Outcome: Progressing  2/11/2023 1624 by Belem Jones RN  Outcome: Progressing     Problem: ABCDS Injury Assessment  Goal: Absence of physical injury  2/11/2023 1624 by Belem Jones RN  Outcome: Progressing

## 2023-02-12 NOTE — CONSULTS
Vascular Surgery Inpatient Consultation Note      Reason for Consultation: Vena cava filter. HISTORY OF PRESENT ILLNESS:                The patient is a 80 y.o. female who is admitted to the hospital for treatment of right hip periprosthetic fracture. Patient fell at the nursing facility. The patient has a history of atrial fibrillation and left lower extremity DVT from 2011 according to the record. She has been on anticoagulation with Eliquis. The patient is scheduled for surgery tomorrow. Vascular surgery is asked for consideration for vena cava filter. IMPRESSION: Periprosthetic hip fracture. History of left lower extremity DVT. Questionable recent right lower extremity DVT. I do not have records showing the new right lower extremity DVT. These may be from the nursing facility. If there is no new DVT and the patient is going to be restarted on anticoagulation for the indication of atrial fibrillation following surgery, I do not feel that a filter is necessary. If she did have a new right DVT that was in the iliac or femoral veins, then a vena cava filter would be reasonable as she would be at higher risk for pulmonary embolism. I called the patient's son Dr. Haleigh Mora and left a voicemail.         Patient Active Problem List   Diagnosis Code    Seizure-like activity ([facility] [address] R56.9    Closed fracture of right hip ([facility] [address] S72.001A       Past Medical History:   Diagnosis Date    Diabetes mellitus ([address]     DVT (deep venous thrombosis) ([facility] [address] 2011    left leg    Fall     recent, falls x3    Hypertension         Past Surgical History:   Procedure Laterality Date     SECTION      x4    COLONOSCOPY      HIP SURGERY      right    KNEE SURGERY      left       Current Medications:    dextrose      dextrose 5 % and 0.45 % NaCl 75 mL/hr at 23 0839      morphine (PF), acetaminophen, HYDROcodone 5 mg - acetaminophen, LORazepam, glucose, dextrose bolus **OR** dextrose bolus, glucagon (rDNA), dextrose, trimethobenzamide    enoxaparin  60 mg SubCUTAneous BID    levothyroxine  25 mcg Oral Daily    sennosides  10 mL Oral Daily    insulin lispro  0-8 Units SubCUTAneous TID WC    insulin lispro  0-4 Units SubCUTAneous Nightly    polyethylene glycol  17 g Oral Daily    amiodarone  200 mg Oral Daily        Allergies:  Sulfa antibiotics    Social History     Socioeconomic History    Marital status:      Spouse name: Not on file    Number of children: Not on file    Years of education: Not on file    Highest education level: Not on file   Occupational History    Not on file   Tobacco Use    Smoking status: Never    Smokeless tobacco: Never   Substance and Sexual Activity    Alcohol use: Yes     Alcohol/week: 1.0 standard drink     Types: 1 Glasses of wine per week     Comment: rare    Drug use: No    Sexual activity: Never     Partners: Male   Other Topics Concern    Not on file   Social History Narrative    Not on file     Social Determinants of Health     Financial Resource Strain: Not on file   Food Insecurity: Not on file   Transportation Needs: Not on file   Physical Activity: Not on file   Stress: Not on file   Social Connections: Not on file   Intimate Partner Violence: Not on file   Housing Stability: Not on file        No family history on file. REVIEW OF SYSTEMS: Chart reviewed, unable to contribute.     Eyes:      Blurred vision:  No [x]/Yes []               Diplopia:   No [x]/Yes []               Vision loss:       No [x]/Yes []   Ears, nose, throat:             Hearing loss:    No [x]/Yes []      Vertigo:   No [x]/Yes []                       Swallowing problem:  No [x]/Yes []               Nose bleeds:   No [x]/Yes []      Voice hoarseness:  No [x]/Yes []  Respiratory:             Cough:   No [x]/Yes []      Pleuritic chest pain:  No [x]/Yes []                        Dyspnea:   No [x]/Yes []      Wheezing:   No [x]/Yes []  Cardiovascular:             Angina:   No [x]/Yes []      Palpitations:   No [x]/Yes []          Claudication:    No [x]/Yes []      Leg swelling:   No [x]/Yes []  Gastrointestinal:             Nausea or vomiting:  No [x]/Yes []               Abdominal pain:  No [x]/Yes []                     Intestinal bleeding: No [x]/Yes []  Musculoskeletal:             Leg pain:   No [x]/Yes []      Back pain:   No [x]/Yes []                    Weakness:   No [x]/Yes []  Neurologic:             Numbness:   No [x]/Yes []      Paralysis:   No [x]/Yes []                       Headaches:   No [x]/Yes []  Hematologic, lymphatic:   Anemia:   No [x]/Yes []              Bleeding or bruising:  No [x]/Yes []              Fevers or chills: No [x]/Yes []  Endocrine:             Temp intolerance:   No [x]/Yes []                       Polydipsia, polyuria:  No [x]/Yes []  Skin:              Rash:    No [x]/Yes []      Ulcers:   No [x]/Yes []              Abnorm pigment: No [x]/Yes []  :              Frequency/urgency:  No [x]/Yes []      Hematuria:    No [x]/Yes []                      Incontinence:    No [x]/Yes []    PHYSICAL EXAM:  Vitals:    02/12/23 1555   BP: 123/62   Pulse: 86   Resp: 20   Temp: 98.3 °F (36.8 °C)   SpO2: 99%     General Appearance: alert and oriented to person, place and time, in no acute distress, well developed and well- nourished  Neurologic: no cranial nerve deficit, speech normal  Head: normocephalic and atraumatic  Eyes: extraocular eye movements intact, conjunctivae normal  ENT: external ear and ear canal normal bilaterally, nose without deformity  Pulmonary/Chest: normal air movement, no respiratory distress  Cardiovascular: normal rate, regular rhythm  Abdomen: non-distended, no masses  Musculoskeletal: no joint deformity or tenderness  Extremities: no leg edema bilaterally  Skin: warm and dry, no rash or erythema    PULSE EXAM      Right      Left   Brachial     Radial     Femoral     Popliteal     Dorsalis Pedis     Posterior Tibial     (3=normal, 2=diminished, 1=barely palpable, 4=widened)      LABS:    Lab Results   Component Value Date    WBC 6.2 02/12/2023    HGB 9.2 (L) 02/12/2023    HCT 27.6 (L) 02/12/2023     02/12/2023    PROTIME 18.5 (H) 02/11/2023    INR 1.7 02/11/2023    K 3.5 02/12/2023    BUN 22 02/12/2023    CREATININE 0.7 02/12/2023       RADIOLOGY:    Previous ultrasounds and CAT scans reviewed.

## 2023-02-12 NOTE — CARE COORDINATION
I met with pt's three sons in the room this a.m. pt has 4 grown children and all reside in different states. 2 of the 3 sons are leaving for home today. Her son , Juaquin Siemens, will be here and is in from [place]. There is a daughter as well. Pt was able to ambulate max assist with a lap belt and fww pta. She has a 80year old spouse that resides in [facility] who is driving and independent. Pt was living in [place] for about 5 months with her son, Juaquin Siemens and his wife and he brought her back to [facility] a few days ago and placed her in [place]. She was only there less than 24 hours and fell. Pt was active with [facility] but was withdrawn from services due to admission in to this hospital. Her son, Diamante Urban, is her dpoa and a neurosurgeon in [place]. He is flying back today and will return next week. He can be reached at [phone]. We are to call him first. If he cannot be reached then we are to call his sister, Josh Burden, [phone]. Kadi Kenney will be in town and he can be reached at [phone]. The are waiting for ortho to see if pt will require surgery for pain management reasons. They are not sure pt will need to go to rehab and I provided a luann list to them. If no rehab then the paln is back to [facility] and reconsult hospice. The also want information on private caregivers to provide pt with 24/7 care. Sw to follow up with rigoberto tomorrow for discharge plan pending ortho. The Plan for Transition of Care is related to the following treatment goals: luann   The Patient and/or patient representative  was provided with a choice of provider and agrees   with the discharge plan. Yes   Freedom of choice list was provided with basic dialogue that supports the patient's individualized plan of care/goals, treatment preferences and shares the quality data associated with the providers.  [x] Yes       Case Management Assessment  Initial Evaluation    Date/Time of Evaluation: 2/12/2023 10:27 AM  Assessment Completed by: CRISTAL Hamilton    If patient is discharged prior to next notation, then this note serves as note for discharge by case management. Patient Name: Princess Crain                   YOB: 1931  Diagnosis: Closed fracture of right hip, initial encounter [facility]) [S72.001A]  Closed right hip fracture, initial encounter ([facility] [address] [S72.001A]  Atrial fibrillation, unspecified type ([facility] [address] [I48.91]                   Date / Time: 2/11/2023  1:09 PM    Patient Admission Status: Inpatient   Readmission Risk (Low < 19, Mod (19-27), High > 27): Readmission Risk Score: 13.3    Current PCP: Andres Ward, DO  PCP verified by CM? Yes    Chart Reviewed: No      History Provided by: Child/Family  Patient Orientation: Self    Patient Cognition: Dementia / Early Alzheimer's    Hospitalization in the last 30 days (Readmission):  No    If yes, Readmission Assessment in CM Navigator will be completed. Advance Directives:      Code Status: Prior   Patient's Primary Decision Maker is:      Primary Decision MakeMiguel Calixto  Barbie - [phone]    Discharge Planning:    Patient lives with: Other (Comment) (facility) Type of Home: [place]  Primary Care Giver:  Other (Comment) (pt is from [employer]  Patient Support Systems include: Children, Spouse/Significant Other   Current Financial resources:    Current community resources:    Current services prior to admission: [facility] Falcon            Current DME:              Type of Home Care services:  None    ADLS  Prior functional level: Feeding, Assistance with the following:, Bathing, Dressing, Toileting, Cooking, Housework, Shopping, Mobility  Current functional level: Assistance with the following:, Bathing, Dressing, Toileting, Feeding, Cooking, Housework, Shopping, Mobility    PT AM-PAC:   /24  OT AM-PAC:   /24    Family can provide assistance at DC: No (plan is either back to the [facility] or to luann first.)  Would you like Case Management to discuss the discharge plan with any other family members/significant others, and if so, who? Yes  Plans to Return to Present Housing: Unknown at present  Other Identified Issues/Barriers to RETURNING to current housing: none. Potential Assistance needed at discharge: N/A            Potential DME:    Patient expects to discharge to: Unknown  Plan for transportation at discharge:      Financial    Payor: Erika Nicole / Plan: Floy Shape / Product Type: *No Product type* /     Does insurance require precert for SNF: Yes    Potential assistance Purchasing Medications:    Meds-to-Beds request:        [id number] [address], [address]  [place] [address] [address]  Phone: [phone] Fax: [phone]      Notes:    Factors facilitating achievement of predicted outcomes: Family support    Barriers to discharge: Limited safety awareness, Limited participation, and Limited insight into deficits    Additional Case Management Notes: see above notes     The Plan for Transition of Care is related to the following treatment goals of Closed fracture of right hip, initial encounter ([facility] [address] [S72.001A]  Closed right hip fracture, initial encounter ([facility] [address] [S72.001A]  Atrial fibrillation, unspecified type ([address] [S45.91]    IF APPLICABLE: The Patient and/or patient representative Stephanie and her family were provided with a choice of provider and agrees with the discharge plan. Freedom of choice list with basic dialogue that supports the patient's individualized plan of care/goals and shares the quality data associated with the providers was provided to:     Patient Representative Name:       The Patient and/or Patient Representative Agree with the Discharge Plan?       Barry Diaz [facility]  Case Management Department  Ph: [phone] Fax: [phone]

## 2023-02-12 NOTE — PROGRESS NOTES
The patient's son who is the POA contacted orthopedic surgery requesting to speak with operating surgeon in person tomorrow. It was stated that operating surgeon will be in tomorrow morning prior to cases and will be available to answer any questions or address any concerns. Appreciate response and is looking forward to speaking with someone tomorrow. All questions and concerns were addressed to his satisfaction. Continue plan indicated most recent progress note and consult note.

## 2023-02-13 LAB
ALBUMIN SERPL-MCNC: 3.4 G/DL (ref 3.5–5.2)
ALP BLD-CCNC: 79 U/L (ref 35–104)
ALT SERPL-CCNC: 11 U/L (ref 0–32)
ANION GAP SERPL CALCULATED.3IONS-SCNC: 11 MMOL/L (ref 7–16)
AST SERPL-CCNC: 21 U/L (ref 0–31)
BASOPHILS ABSOLUTE: 0.04 E9/L (ref 0–0.2)
BASOPHILS RELATIVE PERCENT: 0.6 % (ref 0–2)
BILIRUB SERPL-MCNC: 1.3 MG/DL (ref 0–1.2)
BUN BLDV-MCNC: 15 MG/DL (ref 6–23)
CALCIUM SERPL-MCNC: 8.3 MG/DL (ref 8.6–10.2)
CHLORIDE BLD-SCNC: 106 MMOL/L (ref 98–107)
CO2: 23 MMOL/L (ref 22–29)
CREAT SERPL-MCNC: 0.9 MG/DL (ref 0.5–1)
EKG ATRIAL RATE: 100 BPM
EKG P AXIS: 66 DEGREES
EKG P-R INTERVAL: 206 MS
EKG Q-T INTERVAL: 352 MS
EKG QRS DURATION: 56 MS
EKG QTC CALCULATION (BAZETT): 454 MS
EKG R AXIS: 60 DEGREES
EKG T AXIS: 76 DEGREES
EKG VENTRICULAR RATE: 100 BPM
EOSINOPHILS ABSOLUTE: 0.03 E9/L (ref 0.05–0.5)
EOSINOPHILS RELATIVE PERCENT: 0.5 % (ref 0–6)
GFR SERPL CREATININE-BSD FRML MDRD: >60 ML/MIN/1.73
GLUCOSE BLD-MCNC: 262 MG/DL (ref 74–99)
HCT VFR BLD CALC: 27.1 % (ref 34–48)
HEMOGLOBIN: 9 G/DL (ref 11.5–15.5)
IMMATURE GRANULOCYTES #: 0.02 E9/L
IMMATURE GRANULOCYTES %: 0.3 % (ref 0–5)
LYMPHOCYTES ABSOLUTE: 1.34 E9/L (ref 1.5–4)
LYMPHOCYTES RELATIVE PERCENT: 20.5 % (ref 20–42)
MCH RBC QN AUTO: 31 PG (ref 26–35)
MCHC RBC AUTO-ENTMCNC: 33.2 % (ref 32–34.5)
MCV RBC AUTO: 93.4 FL (ref 80–99.9)
METER GLUCOSE: 145 MG/DL (ref 74–99)
METER GLUCOSE: 209 MG/DL (ref 74–99)
METER GLUCOSE: 237 MG/DL (ref 74–99)
METER GLUCOSE: 246 MG/DL (ref 74–99)
MONOCYTES ABSOLUTE: 0.62 E9/L (ref 0.1–0.95)
MONOCYTES RELATIVE PERCENT: 9.5 % (ref 2–12)
NEUTROPHILS ABSOLUTE: 4.48 E9/L (ref 1.8–7.3)
NEUTROPHILS RELATIVE PERCENT: 68.6 % (ref 43–80)
PDW BLD-RTO: 13.2 FL (ref 11.5–15)
PLATELET # BLD: 197 E9/L (ref 130–450)
PMV BLD AUTO: 10 FL (ref 7–12)
POTASSIUM SERPL-SCNC: 3.7 MMOL/L (ref 3.5–5)
RBC # BLD: 2.9 E12/L (ref 3.5–5.5)
SODIUM BLD-SCNC: 140 MMOL/L (ref 132–146)
TOTAL PROTEIN: 5.8 G/DL (ref 6.4–8.3)
WBC # BLD: 6.5 E9/L (ref 4.5–11.5)

## 2023-02-13 PROCEDURE — 80053 COMPREHEN METABOLIC PANEL: CPT

## 2023-02-13 PROCEDURE — 2500000003 HC RX 250 WO HCPCS: Performed by: INTERNAL MEDICINE

## 2023-02-13 PROCEDURE — 6370000000 HC RX 637 (ALT 250 FOR IP): Performed by: INTERNAL MEDICINE

## 2023-02-13 PROCEDURE — 99231 SBSQ HOSP IP/OBS SF/LOW 25: CPT | Performed by: NURSE PRACTITIONER

## 2023-02-13 PROCEDURE — 2580000003 HC RX 258: Performed by: INTERNAL MEDICINE

## 2023-02-13 PROCEDURE — 6360000002 HC RX W HCPCS: Performed by: ORTHOPAEDIC SURGERY

## 2023-02-13 PROCEDURE — 85025 COMPLETE CBC W/AUTO DIFF WBC: CPT

## 2023-02-13 PROCEDURE — 93010 ELECTROCARDIOGRAM REPORT: CPT | Performed by: INTERNAL MEDICINE

## 2023-02-13 PROCEDURE — 36415 COLL VENOUS BLD VENIPUNCTURE: CPT

## 2023-02-13 PROCEDURE — 82962 GLUCOSE BLOOD TEST: CPT

## 2023-02-13 PROCEDURE — 2140000000 HC CCU INTERMEDIATE R&B

## 2023-02-13 RX ADMIN — POLYETHYLENE GLYCOL 3350 17 G: 17 POWDER, FOR SOLUTION ORAL at 11:19

## 2023-02-13 RX ADMIN — INSULIN LISPRO 2 UNITS: 100 INJECTION, SOLUTION INTRAVENOUS; SUBCUTANEOUS at 08:56

## 2023-02-13 RX ADMIN — ENOXAPARIN SODIUM 60 MG: 100 INJECTION SUBCUTANEOUS at 08:56

## 2023-02-13 RX ADMIN — MORPHINE SULFATE 2 MG: 2 INJECTION, SOLUTION INTRAMUSCULAR; INTRAVENOUS at 11:17

## 2023-02-13 RX ADMIN — ENOXAPARIN SODIUM 60 MG: 100 INJECTION SUBCUTANEOUS at 20:44

## 2023-02-13 RX ADMIN — INSULIN LISPRO 2 UNITS: 100 INJECTION, SOLUTION INTRAVENOUS; SUBCUTANEOUS at 16:43

## 2023-02-13 RX ADMIN — DEXTROSE AND SODIUM CHLORIDE: 5; 450 INJECTION, SOLUTION INTRAVENOUS at 11:57

## 2023-02-13 RX ADMIN — AMIODARONE HYDROCHLORIDE 200 MG: 200 TABLET ORAL at 08:56

## 2023-02-13 RX ADMIN — LEVOTHYROXINE SODIUM 25 MCG: 0.05 TABLET ORAL at 06:04

## 2023-02-13 RX ADMIN — SENNOSIDES 10 ML: 8.8 SYRUP ORAL at 11:19

## 2023-02-13 RX ADMIN — INSULIN LISPRO 2 UNITS: 100 INJECTION, SOLUTION INTRAVENOUS; SUBCUTANEOUS at 12:01

## 2023-02-13 ASSESSMENT — PAIN DESCRIPTION - DESCRIPTORS: DESCRIPTORS: DISCOMFORT;NAGGING;SORE

## 2023-02-13 ASSESSMENT — PAIN SCALES - GENERAL
PAINLEVEL_OUTOF10: 0
PAINLEVEL_OUTOF10: 7

## 2023-02-13 ASSESSMENT — PAIN - FUNCTIONAL ASSESSMENT: PAIN_FUNCTIONAL_ASSESSMENT: PREVENTS OR INTERFERES WITH ALL ACTIVE AND SOME PASSIVE ACTIVITIES

## 2023-02-13 ASSESSMENT — PAIN DESCRIPTION - LOCATION: LOCATION: HIP;LEG

## 2023-02-13 ASSESSMENT — PAIN DESCRIPTION - ORIENTATION: ORIENTATION: RIGHT

## 2023-02-13 NOTE — CARE COORDINATION
Called [facility] to check if patient is able to return. Spoke with nurse, Maryellen Ospina at [facility]. She reports the patient will need to come back in a wheelchair if family wants her to return. They will need to update her treatment plan since she will require more care; patient does not have to be able to wheel herself around. Maryellen Ospina states the patient already has a wheelchair there (provided by [facility]). Informed her, dc/ plan is still unknown at this time and  will keep her posted. Call made to patient's son, Laura Kerr to provide update regarding the AL. He states the plan is for the patient to return to [facility] but is unsure if she will be able to tolerate sitting in a wheelchair. If patient is unable to return back to the AL, MARY choice is [facility]; states his sister, Chandana Rondon will be visiting the facility. Orthopedic surgery following for closed, right periprosthetic hip fracture.     Cathie Calixto, MSW, LSW [phone]

## 2023-02-13 NOTE — PROGRESS NOTES
[facility] MD [facility]  Internal medicine  Progress Notes      CHIEF COMPLAINT: Patient fell at nursing facility      HISTORY OF PRESENT ILLNESS:   2023   Patient was seen on the floor earlier this morning at the main campus of [facility] with the ER physician at the time of admission  Data reviewed in detail with her 3 sons at bedside and a registered nurse  Patient currently living in a nursing facility due to advanced dementia  Presented to ER after sustaining a mechanical fall that resulted in right hip periprosthetic fracture  Admitted for further management  Patient was on Eliquis for atrial fibrillation paroxysmal and recent onset of DVT right lower extremity  Pain control adequate  2023  Patient was seen on the floor earlier this morning  Pain control adequate  Data reviewed in detail    Past Medical History:    Past Medical History:   Diagnosis Date    Diabetes mellitus ([facility] [address]     DVT (deep venous thrombosis) ([facility] [address] 2011    left leg    Fall     recent, falls x3    Hypertension        Past Surgical History:    Past Surgical History:   Procedure Laterality Date     SECTION      x4    COLONOSCOPY      HIP SURGERY      right    KNEE SURGERY      left       Medications Prior to Admission:    Medications Prior to Admission: polyethylene glycol (GLYCOLAX) 17 GM/SCOOP powder, Take 17 g by mouth daily  levothyroxine (SYNTHROID) 25 MCG tablet, Take 25 mcg by mouth Daily  senna (SENOKOT) 8.8 MG/5ML SYRP syrup, Take 10 mLs by mouth daily  LORazepam (ATIVAN) 0.5 MG tablet, Take 0.5 mg by mouth every 6 hours as needed for Anxiety. [DISCONTINUED] insulin glargine (LANTUS) 100 UNIT/ML injection vial, Inject 18 Units into the skin nightly  amiodarone (PACERONE) 100 MG tablet, Take 1 tablet by mouth in the morning. (Patient not taking: Reported on 2023)  losartan (COZAAR) 100 MG tablet, Take 1 tablet by mouth in the morning.  (Patient not taking: Reported on 2023)  docusate sodium (COLACE) 150 MG/15ML liquid, Take 10 mLs by mouth in the morning. (Patient not taking: Reported on 2/11/2023)  levETIRAcetam (KEPPRA) 500 MG tablet, Take 1 tablet by mouth in the morning and 1 tablet before bedtime. (Patient not taking: Reported on 2/11/2023)  [DISCONTINUED] apixaban (ELIQUIS) 2.5 MG TABS tablet, Take 1 tablet by mouth in the morning and 1 tablet before bedtime. metFORMIN (GLUCOPHAGE) 500 MG tablet, Take 500 mg by mouth daily (with breakfast) (Patient not taking: Reported on 2/11/2023)    Allergies:    Sulfa antibiotics    Social History:    reports that she has never smoked. She has never used smokeless tobacco. She reports current alcohol use of about 1.0 standard drink per week. She reports that she does not use drugs. Family History:   family history is not on file. REVIEW OF SYSTEMS:  As above in the HPI, otherwise negative    PHYSICAL EXAM:    Vitals:  BP (!) 163/95   Pulse 92   Temp 98.8 °F (37.1 °C) (Oral)   Resp 20   SpO2 96%     General:  Awake, alert, disoriented  Chronically ill looking  Thin built  HEENT:  Normocephalic, atraumatic. Pupils equal, round, reactive to light. No scleral icterus. No conjunctival injection No nasal discharge. Neck:  Supple  Heart: Regular rate and rhythm with frequent ectopy  Monitor showing PACs with sinus rhythm  Lungs:  CTA bilaterally, bilat symmetrical expansion, no wheeze, rales, or rhonchi  Abdomen:   Bowel sounds present, soft, nontender, no masses, no organomegaly, no peritoneal signs  Extremities:  No clubbing, cyanosis, or edema  Right lower extremity externally rotated with hip tenderness  Skin:  Warm and dry, no open lesions or rash  Neuro:  Cranial nerves 2-12 intact, no focal deficits  Breast: deferred  Rectal: deferred  Genitalia:  deferred    LABS:  Data reviewed      ASSESSMENT:      Principal Problem:    Closed fracture of right hip (HCC)  Periprosthetic  Mechanical fall  DVT right lower extremity 2 weeks ago  Paroxysmal atrial fibrillation currently in sinus rhythm  Advanced dementia        PLAN:  Obtain ultrasound results of the right lower extremity from the nursing home   Pain control  Hold anticoagulants in anticipation of surgical intervention  Consider IVC filter placement preoperatively  Consult vascular surgery/input appreciated  Cardiology consult for preop assessment  Questions answered to the family satisfaction    Mary Anne Richardson MD  10:58 AM  2/13/2023

## 2023-02-13 NOTE — PROGRESS NOTES
Palliative Care Department  Palliative Care Progress Note  Provider: Liliana Moon, JENISE Soto CNP  [phone]    Hospital Day: 3  Date of Initial Consult: 2/12/2023  Referring Provider: Dr. Deric Taveras was consulted for assistance with: Assist with goals of care    Chief Complaint: Delvis Rosales is a 80 y.o. female with chief complaint of fall, hip fracture. HPI:   Delvis Rosales is a 80 y.o. female with significant medical history of Dementia, who prior to presenting had been a DNR CC and enrolled in hospice care, who also recently had been moved to an assisted living facility, reportedly had a fall at the facility, was admitted on 2/11/2023 for further management following a fall which resulted in hip fracture. She is being followed closely by orthopedics, with tentative plan for surgical intervention to provide palliation. ASSESSMENT/PLAN:     Pertinent Hospital Diagnoses:  Current medical issues leading to Palliative Medicine involvement include   Active Hospital Problems    Diagnosis Date Noted    Chronic deep vein thrombosis (DVT) of left lower extremity (HCC) [I82.502] 02/12/2023     Priority: Medium    Atrial fibrillation ([facility]) [I48.91] 02/12/2023     Priority: Medium    Closed fracture of right hip ([facility]) [S72.001A] 02/11/2023     Priority: Medium     Palliative Care Encounter / Counseling Regarding Goals of Care:  Please see detailed goals of care discussion as below. At this time, Delvis Rosales, Does Not have capacity for medical decision-making. Capacity is time limited and situation/question specific.   During encounter patient's children will be surrogate medical decision-maker  Outcome of goals of care meeting: continue current management and to be determined  Patient's family wishes to pursue surgical intervention for ORIF of right hip & IVC filter if indicated  Family wishes for patient to transition back to hospice care after surgical interventions  Code status: DNR-CC  Advanced Directives: Living Will and HC-POA  Surrogate/Legal NOK:   Frank Murillo ([phone]) the patient's son      SUBJECTIVE:   Events/Discussions:  Chart reviewed. Patient seen at the bedside, alert but disoriented. Patient unable to partake in meaningful conversation and unable to make decisions for herself. Patient's daughter, Krishan Ruiz, present at the bedside. Introduced self and role of palliative medicine. Patient's daughter states that patient has a living well her brother, KATIE GAO and herself are healthcare power of 's for the patient. Discussed patient's current condition and comorbidities. Krishan Ruiz states that patient had been at assisted living facility for about 1 week and experienced a fall in which she fractured her right femur. Patient was on hospice services at that time and family revoked to admit patient for possible surgical intervention. Discussed goals of care and CODE STATUS options. Krishan Ruiz states they wish for their mother's CODE STATUS to remain a DNR CC but they wish to pursue with surgical intervention if possible as they do not want their mother to remain in pain. The patient's son, KATIE GAO, is a neurosurgeon and has been updated by Ortho and vascular surgery. Their decision still remains for surgical intervention if indicated and recommended. Krishan Ruiz states they are aware of the potential cardiovascular risks with surgery. They also are aware that patient's CODE STATUS will change to full code during surgical intervention. Patient was hospice services with [facility]. Nickerson Joseph states that patient will discharge to [facility] at discharge. All questions and concerns addressed. Palliative medicine to follow. As per Dr. Naranjo Bi note from 2/12/2023  Patient physically seen and examined. Patient is a very pleasant 80-year-old female who is recently moved to another nursing home and fell suffering a right periprosthetic hip fracture.   She has a history of a revision that hip as well. She is accompanied by her 3 sons when I saw her this morning. One of her sons is a neurosurgeon. We discussed in detail the fact that they had her on hospice and palliative care but he did not want her suffering in pain. I think this is a very reasonable point. I showed him the x-rays with him over the fact that her canal is very large and ectatic. She did fracture the proximal portion of her femur as well as the stem appears loose in the canal.  I do not think a normal press-fit stem would get much bite in her canal due to the thin nature of the bone and the fact that it is wide nearly the whole way down the femur. She would require a large cement load with a revision stem which could potentially drop her pressure and be fatal.  We talked about this in detail. I do want to see how she does mobilizing. We will let her rest for a day get her off of her Eliquis which is for a DVT in her left lower extremity placed on Lovenox. We will see how comfortable she is getting a chair early this week if she is suffering significantly we will lean towards operative intervention which again I discussed at length with the family would be very risky at her age and for how big of the surgery it is. They understand this and agree with our treatment plan of trying to treat it conservatively initially if able. Past Medical History:   Diagnosis Date    Diabetes mellitus ([address]     DVT (deep venous thrombosis) ([facility] [address] 2011    left leg    Fall     recent, falls x3    Hypertension        Past Surgical History:   Procedure Laterality Date     SECTION      x4    COLONOSCOPY      HIP SURGERY      right    KNEE SURGERY      left       No family history on file. Allergies   Allergen Reactions    Sulfa Antibiotics Other (See Comments)     unknown       ROS: UNLESS STATED ABOVE PATIENT DENIES:  CONSTITUTIONAL:  fever, chill, rigors, nausea, vomiting, fatigue.   HEENT: blurry vision, double vision, hearing problem, tinnitus, hoarseness, dysphagia, odynophagia  RESPIRATORY: cough, shortness of breath, sputum expectoration. CARDIOVASCULAR:  Chest pain/pressure, palpitation, syncope, irregular beats  GASTROINTESTINAL:  abdominal or rectal pain, diarrhea, constipation, . GENITOURINARY:  Burning, frequency, urgency, incontinence, discharge  INTEGUMENTARY: rash, wound, pruritis  HEMATOLOGIC/LYMPHATIC:  Swelling, sores, gum bleeding, easy bruising, pica. MUSCULOSKELETAL:  pain, edema, joint swelling or redness  NEUROLOGICAL:  light headed, dizziness, loss of consciousness, weakness, change in memory, seizures, tremors    OBJECTIVE:   Prognosis: unknown    Physical Exam:  BP (!) 163/95   Pulse 92   Temp 98.8 °F (37.1 °C) (Oral)   Resp 20   SpO2 96%     Gen:  Appears stated age, well nourished, in no acute distress  HEENT:  Normocephalic, conjunctiva pink, no drainage, mucosa moist  Neck:  Supple  Lungs:  CTA bilaterally  Heart: RRR  Abd:  Soft, non tender, non distended, BS+  M/S/Ext: Right leg immobilizer in place, no edema, pulses present  Skin:  Warm and dry  Neuro: A&Ox1, follows commands    Objective data reviewed: labs, images, records, medication use, vitals, and chart    Time/Communication:  Greater than 50% of time spent, total 25 minutes in counseling and coordination of care at the bedside regarding goals of care and see above. JENISE Ortega CNP  Palliative Medicine    Patient and the plan of care discussed with the other IDT members of Palliative Care Team, and with consultants, Primary Attending, patient, family, and floor nurses, as appropriate and available. Thank you for allowing Palliative Medicine to participate in the care of Sen Quiles. Note: This report was completed using computer[employer]IN[employer] voiced recognition software. Every effort has been made to ensure accuracy; however, inadvertent computerized transcription errors may be present.

## 2023-02-13 NOTE — PROGRESS NOTES
Department of Orthopedic Surgery  Resident Progress Note      SUBJECTIVE  Patient seen and examined at bedside. Reports diffuse pain all over her body. No further complaints    OBJECTIVE    Physical    VITALS:  /80   Pulse 91   Temp 98 °F (36.7 °C) (Tympanic)   Resp 20   SpO2 100%     In bed, resting comfortably    MUSCULOSKELETAL:   Right lower Extremity:  Knee immobilizer in place to the right knee. Extremity shortened and externally rotated  +Log roll and + Heel Strike  pain with palpation about the lateral aspect of the femur and hip. Compartments soft and compressible, calf non-tender  +DP & PT pulses, Brisk Cap refill, Toes warm and perfused  Unable to assess sensory and motor function due to patient limited response    Data    CBC:   Lab Results   Component Value Date/Time    WBC 6.5 02/13/2023 05:01 AM    RBC 2.90 02/13/2023 05:01 AM    HGB 9.0 02/13/2023 05:01 AM    HCT 27.1 02/13/2023 05:01 AM    MCV 93.4 02/13/2023 05:01 AM    MCH 31.0 02/13/2023 05:01 AM    MCHC 33.2 02/13/2023 05:01 AM    RDW 13.2 02/13/2023 05:01 AM     02/13/2023 05:01 AM    MPV 10.0 02/13/2023 05:01 AM     PT/INR:    Lab Results   Component Value Date/Time    PROTIME 18.5 02/11/2023 08:36 PM    PROTIME 15.6 12/07/2011 04:37 AM    INR 1.7 02/11/2023 08:36 PM       Labs  No results for input(s): BC, BLOODCULT2 in the last 72 hours. No results for input(s): CXSURG in the last 72 hours. ASSESSMENT  Closed, right periprosthetic hip fracture    PLAN    Continue nonweightbearing right lower extremity  Currently recommending nonoperative option seeing patient overall status. PT/OT-fall precautions-we will assess mobility and transfer from bed to chair.   DVT prophylaxis  Pain management

## 2023-02-13 NOTE — CONSULTS
CARDIOLOGY CONSULTATION    Patient Name:  Tommy Mays    :  5/10/1931    Reason for Consultation:   History of atrial fibrillation with rapid ventricular response/syncope/preoperative cardiovascular risk assessment. History of Present Illness:   Tommy Mays presents to [facility],following a  history of sudden unexplained fall having just establish residency at a nursing care facility. She does not recall any of this and her history is offered by her family members specifically her daughter. who is presently at her bedside with Mr. Radha Barrett as well. According to her daughter,Mrs. Radha Barrett has been living in [place] with her son just recently returned to this area. While in [place] she fared quite well. Prior to that she had been hospitalized in 2022 with an apparent Syndrome  and the sudden onset of atrial fibrillation with a rapid ventricular response. At that time she was placed on amiodarone and chronic anticoagulant therapy but it is unclear if she has continued these medications recently. .nonetheless she presents now with a fracture of her right hip and recent history of DVT right lower extremity 2 weeks ago. Past Medical History:   has a past medical history of Diabetes mellitus ([address] DVT (deep venous thrombosis) ([facility] [address] Fall, and Hypertension. Surgical History:   has a past surgical history that includes  section; knee surgery; hip surgery; and Colonoscopy. Social History:   reports that she has never smoked. She has never used smokeless tobacco. She reports current alcohol use of about 1.0 standard drink per week. She reports that she does not use drugs. Family History:  family history is remarkable for mother deseased CVA and father unknown etiology. Medications:  Prior to Admission medications    Medication Sig Start Date End Date Taking?  Authorizing Provider   polyethylene glycol (GLYCOLAX) 17 GM/SCOOP powder Take 17 g by mouth daily   Yes Historical Provider, MD   levothyroxine (SYNTHROID) 25 MCG tablet Take 25 mcg by mouth Daily   Yes Historical Provider, MD   senna (SENOKOT) 8.8 MG/5ML SYRP syrup Take 10 mLs by mouth daily   Yes Historical Provider, MD   LORazepam (ATIVAN) 0.5 MG tablet Take 0.5 mg by mouth every 6 hours as needed for Anxiety.   Yes Historical Provider, MD   amiodarone (PACERONE) 100 MG tablet Take 1 tablet by mouth in the morning.  Patient not taking: Reported on 2/11/2023 7/24/22   Albert Hoff MD   losartan (COZAAR) 100 MG tablet Take 1 tablet by mouth in the morning.  Patient not taking: Reported on 2/11/2023 7/24/22   Albert Hoff MD   docusate sodium (COLACE) 150 MG/15ML liquid Take 10 mLs by mouth in the morning.  Patient not taking: Reported on 2/11/2023 7/24/22   Albert Hoff MD   levETIRAcetam (KEPPRA) 500 MG tablet Take 1 tablet by mouth in the morning and 1 tablet before bedtime.  Patient not taking: Reported on 2/11/2023 7/23/22   Albert Hoff MD   metFORMIN (GLUCOPHAGE) 500 MG tablet Take 500 mg by mouth daily (with breakfast)  Patient not taking: Reported on 2/11/2023    Historical Provider, MD       Allergies:  Sulfa antibiotics     Review of Systems:   Constitutional: there has been no unanticipated weight loss. There's been no significant change in energy or activity level, nor sleep pattern . No fever chills or rigors.    Eyes: No visual changes or diplopia. No scleral icterus.  ENT: No Headaches, hearing loss or vertigo. No mouth sores or sore throat. No change in taste or smell.  Cardiovascular: No chest discomfort, dyspnea on exertion, palpitations, loss of consciousness, no phlebitis, no claudication.  Respiratory: No cough or wheezing, no sputum production. No hemoptysis, pleuritic pain.  Gastrointestinal: No abdominal pain, appetite loss, blood in stools. No change in bowel habits. No hematemesis  Genitourinary: No dysuria, trouble voiding or hematuria. No nocturia or increased  frequency. Musculoskeletal:  No gait disturbance, weakness or joint complaints. Integumentary: No rash or pruritis. Neurological: No headache, diplopia, change in muscle strength, numbness or tingling. No change in gait, balance, coordination, mood, affect, memory, mentation, behavior. Transient change in neurologic cognitive status. Psychiatric: No anxiety or depression.+ Dementia  Endocrine: No temperature intolerance. No excessive thirst, fluid intake, or urination. No tremor. Hematologic/Lymphatic: No abnormal bruising or bleeding, blood clots or swollen lymph nodes. Allergic/Immunologic: No nasal congestion or hives. Physical Examination:    Vital Signs: /80   Pulse 91   Temp 98 °F (36.7 °C) (Tympanic)   Resp 20   SpO2 100%   General appearance: Well preserved, mesomorphic body habitus, alert, no distress. Skin: Skin color, texture, turgor normal. No rashes or lesions. No induration or tightening palpated. Head: Normocephalic. No masses, lesions, tenderness or abnormalities  Eyes: Conjunctivae/corneas clear. PERRL, EOMs intact. Sclera non icteric. Ears: External ears normal. Canals clear. TM's clear bilaterally. Hearing normal to finger rub. Nose/Sinuses: Nares normal. Septum midline. Mucosa normal. No drainage or sinus tenderness. Oropharynx: Lips, mucosa, and tongue normal. Oropharynx clear with no exudate seen. Neck: Neck supple and symmetric. No adenopathy. Thyroid symmetric, normal size, without nodules. Trachea is midline. Carotids brisk in upstroke without bruits, no abnormal JVP noted at 45°. Chest: Even excursion  Lungs: Lungs clear to auscultation bilaterally. No retractions or use of accessory muscles. No tactile vocal fremitus. No rhonchi, crackles or rales. Heart:  S1 > S2. Regular, regular rhythm. No gallop or murmur. No rub, palpable thrill or heave noted. PMI 5th intercostal space midclavicular line. Abdomen: Abdomen soft, mildly protuberant, non-tender.  BS normal. No masses, organomegaly. No hernia noted. Extremities: Extremities normal. No deformities, edema, or skin discoloration. No cyanosis or clubbing noted to the nails. Peripheral pulses present 2+ upper extremities and present 1+  lower extremities. Musculoskeletal: Spine ROM normal. Muscular strength intact. Neuro: Cranial nerves intact. Motor: Strength 5/5 in all extremities. Reflexes 2+ in all extremities. No focal weakness. Sensory: grossly normal to touch. Coordination intact. Pertinent Labs:  CBC:   Recent Labs     02/11/23  0836 02/12/23 0456   WBC 13.7* 6.2   HGB 12.0 9.2*    214     BMP:  Recent Labs     02/11/23  0836 02/12/23 0456    141   K 3.7 3.5    108*   CO2 23 25   BUN 24* 22   CREATININE 0.7 0.7   GLUCOSE 194* 193*   LABGLOM >60 >60     ABGs:   Lab Results   Component Value Date/Time    PH 7.39 12/05/2011 06:10 PM     INR:   Recent Labs     02/11/23  0836 02/11/23 2036   INR 1.5 1.7     PRO-BNP:   Lab Results   Component Value Date    PROBNP 130 02/11/2023    PROBNP 118 04/11/2017      Cardiac Injury Profile:   Recent Labs     02/11/23  0836   TROPHS 19*      Lipid Profile:   Lab Results   Component Value Date/Time    TRIG 146 07/28/2022 03:30 PM    HDL 41 07/28/2022 03:30 PM    LDLCALC 154 07/28/2022 03:30 PM    CHOL 224 07/28/2022 03:30 PM      Thyroid:   Lab Results   Component Value Date    TSH 10.940 (H) 02/11/2023      Hemoglobin A1C: No components found for: HGBA1C   ECG:  See report    Radiology:  No orders to display     Assessment:    Patient Active Problem List   Diagnosis Code    Seizure-like activity ([address] R56.9    Closed fracture of right hip ([address] S72.001A    Chronic deep vein thrombosis (DVT) of left lower extremity ([address] I82.502    Atrial fibrillation ([address] I48.91       Plan:  Following an in-depth discussion with Mrs. Reyna Saravia daughter and  they wish for her to proceed with surgery fully recognizing the potential cardiovascular risks.   She will need to hold her apixaban for 48 hours prior to the surgical procedure if she indeed is on this medication which has not to my knowledge been substantiated yet.  Likewise, would continue and/or adjust dosage for both levothyroxine and amiodarone.  I will follow her throughout the perioperative period.    I have spent more than 48 minutes face to face with Stephanie Lilly,and reviewing notes and laboratory data with greater than 50% of this time instructing and counseling the patient and her daughter and son per telephone regarding my findings and recommendations and I have answered all questions as posed to me by MsSyed Maxx. Thank you, Albert Hoff MD and Josse Milton DO for allowing me to consult in the care of this patient.    Kirk Infante DO , FACP, FACC, [facility]    NOTE:  This report was transcribed using voice recognition software.  Every effort was made to ensure accuracy; however, inadvertent computerized transcription errors may be present.

## 2023-02-13 NOTE — CONSULTS
Palliative Care Department  Palliative Care Initial Consult  Provider: Dorothy Form, APRN - CNP  [phone]    Hospital Day: 2  Date of Initial Consult: 2/12/2023  Referring Provider: Dr. Norman Gallagher was consulted for assistance with: Assist with goals of care    Chief Complaint: Dedrick Preston is a 80 y.o. female with chief complaint of fall, hip fracture. HPI:   Dedrick Preston is a 80 y.o. female with significant medical history of Dementia, who prior to presenting had been a DNR CC and enrolled in hospice care, who also recently had been moved to an assisted living facility, reportedly had a fall at the facility, was admitted on 2/11/2023 for further management following a fall which resulted in hip fracture. She is being followed closely by orthopedics, with tentative plan for surgical intervention to provide palliation. ASSESSMENT/PLAN:     Pertinent Hospital Diagnoses:  Current medical issues leading to Palliative Medicine involvement include   Active Hospital Problems    Diagnosis Date Noted    Chronic deep vein thrombosis (DVT) of left lower extremity (HCC) [I82.502] 02/12/2023     Priority: Medium    Atrial fibrillation ([address] [I48.91] 02/12/2023     Priority: Medium    Closed fracture of right hip ([facility] [S72.001A] 02/11/2023     Priority: Medium     Palliative Care Encounter / Counseling Regarding Goals of Care:  Please see detailed goals of care discussion as below. At this time, Dedrick Preston, Does Not have capacity for medical decision-making. Capacity is time limited and situation/question specific.   During encounter patient's children will be surrogate medical decision-maker  Outcome of goals of care meeting: continue current management and to be determined  Current plan is to treat conservatively  Further surgical intervention will be discussed between the family and orthopedic surgery  Code status: DNR-CC  We will confirm DNR status with family  Advanced Directives: Living Will and HC-POA  Surrogate/Legal NOK:   Mell Mueller ([phone]) the patient's son      SUBJECTIVE:   Events/Discussions:  Mrs. Chrissie Jarquin seen at the bedside, no family present. She is alert, oriented to person, but confused. She complains of pain in her leg, does not appear to be in any acute distress. No family was present during my encounter, it appears as though also been well discussed with the orthopedic team as detailed below. The palliative team will follow-up further tomorrow to further discuss goals of care. As per Dr. Norma Lagos note from 2/12/2023  Patient physically seen and examined. Patient is a very pleasant 72-year-old female who is recently moved to another nursing home and fell suffering a right periprosthetic hip fracture. She has a history of a revision that hip as well. She is accompanied by her 3 sons when I saw her this morning. One of her sons is a neurosurgeon. We discussed in detail the fact that they had her on hospice and palliative care but he did not want her suffering in pain. I think this is a very reasonable point. I showed him the x-rays with him over the fact that her canal is very large and ectatic. She did fracture the proximal portion of her femur as well as the stem appears loose in the canal.  I do not think a normal press-fit stem would get much bite in her canal due to the thin nature of the bone and the fact that it is wide nearly the whole way down the femur. She would require a large cement load with a revision stem which could potentially drop her pressure and be fatal.  We talked about this in detail. I do want to see how she does mobilizing. We will let her rest for a day get her off of her Eliquis which is for a DVT in her left lower extremity placed on Lovenox.   We will see how comfortable she is getting a chair early this week if she is suffering significantly we will lean towards operative intervention which again I discussed at length with the family would be very risky at her age and for how big of the surgery it is. They understand this and agree with our treatment plan of trying to treat it conservatively initially if able. Past Medical History:   Diagnosis Date    Diabetes mellitus ([facility] [address]     DVT (deep venous thrombosis) ([facility] [address] 2011    left leg    Fall     recent, falls x3    Hypertension        Past Surgical History:   Procedure Laterality Date     SECTION      x4    COLONOSCOPY      HIP SURGERY      right    KNEE SURGERY      left       No family history on file. Allergies   Allergen Reactions    Sulfa Antibiotics Other (See Comments)     unknown       ROS: UNLESS STATED ABOVE PATIENT DENIES:  CONSTITUTIONAL:  fever, chill, rigors, nausea, vomiting, fatigue. HEENT: blurry vision, double vision, hearing problem, tinnitus, hoarseness, dysphagia, odynophagia  RESPIRATORY: cough, shortness of breath, sputum expectoration. CARDIOVASCULAR:  Chest pain/pressure, palpitation, syncope, irregular beats  GASTROINTESTINAL:  abdominal or rectal pain, diarrhea, constipation, . GENITOURINARY:  Burning, frequency, urgency, incontinence, discharge  INTEGUMENTARY: rash, wound, pruritis  HEMATOLOGIC/LYMPHATIC:  Swelling, sores, gum bleeding, easy bruising, pica.   MUSCULOSKELETAL:  pain, edema, joint swelling or redness  NEUROLOGICAL:  light headed, dizziness, loss of consciousness, weakness, change in memory, seizures, tremors    OBJECTIVE:   Prognosis: unknown    Physical Exam:  /62   Pulse 86   Temp 98.3 °F (36.8 °C) (Axillary)   Resp 20   SpO2 99%     Gen:  Appears stated age, well nourished, in no acute distress  HEENT:  Normocephalic, conjunctiva pink, no drainage, mucosa moist  Neck:  Supple  Lungs:  CTA bilaterally  Heart: RRR  Abd:  Soft, non tender, non distended, BS+  M/S/Ext: Right leg immobilizer in place, no edema, pulses present  Skin:  Warm and dry  Neuro:  PERRL, Alert, oriented to person, confused; following commands    Objective data reviewed: labs, images, records, medication use, vitals, and chart    Time/Communication:  Greater than 50% of time spent, total 40 minutes in counseling and coordination of care at the bedside regarding goals of care and see above. JENISE Melgar CNP  Palliative Medicine    Patient and the plan of care discussed with the other IDT members of Palliative Care Team, and with consultants, Primary Attending, patient, family, and floor nurses, as appropriate and available. Thank you for allowing Palliative Medicine to participate in the care of Sen Quiles. Note: This report was completed using computerPerformance Lab voiced recognition software. Every effort has been made to ensure accuracy; however, inadvertent computerized transcription errors may be present.

## 2023-02-13 NOTE — PLAN OF CARE
Problem: Chronic Conditions and Co-morbidities  Goal: Patient's chronic conditions and co-morbidity symptoms are monitored and maintained or improved  2/12/2023 1619 by Manuel Joseph RN  Outcome: Progressing     Problem: Discharge Planning  Goal: Discharge to home or other facility with appropriate resources  2/13/2023 0226 by Frederick Escobar RN  Outcome: Progressing  2/12/2023 1619 by Manuel Joseph RN  Outcome: Progressing     Problem: Skin/Tissue Integrity  Goal: Absence of new skin breakdown  Description: 1. Monitor for areas of redness and/or skin breakdown  2. Assess vascular access sites hourly  3. Every 4-6 hours minimum:  Change oxygen saturation probe site  4. Every 4-6 hours:  If on nasal continuous positive airway pressure, respiratory therapy assess nares and determine need for appliance change or resting period.   2/12/2023 1619 by Manuel Joseph RN  Outcome: Progressing     Problem: Safety - Adult  Goal: Free from fall injury  2/13/2023 0226 by Frederick Escobar RN  Outcome: Progressing  2/12/2023 1619 by Manuel Joseph RN  Outcome: Progressing     Problem: ABCDS Injury Assessment  Goal: Absence of physical injury  2/12/2023 1619 by Manuel Joseph RN  Outcome: Progressing     Problem: Pain  Goal: Verbalizes/displays adequate comfort level or baseline comfort level  2/13/2023 0226 by Frederick Escobar RN  Outcome: Progressing  2/12/2023 1619 by Manuel Joseph RN  Outcome: Progressing

## 2023-02-14 LAB
ALBUMIN SERPL-MCNC: 3.2 G/DL (ref 3.5–5.2)
ALP BLD-CCNC: 70 U/L (ref 35–104)
ALT SERPL-CCNC: 11 U/L (ref 0–32)
ANION GAP SERPL CALCULATED.3IONS-SCNC: 12 MMOL/L (ref 7–16)
AST SERPL-CCNC: 19 U/L (ref 0–31)
BASOPHILS ABSOLUTE: 0.04 E9/L (ref 0–0.2)
BASOPHILS RELATIVE PERCENT: 0.6 % (ref 0–2)
BILIRUB SERPL-MCNC: 1.5 MG/DL (ref 0–1.2)
BUN BLDV-MCNC: 10 MG/DL (ref 6–23)
CALCIUM SERPL-MCNC: 8.1 MG/DL (ref 8.6–10.2)
CHLORIDE BLD-SCNC: 101 MMOL/L (ref 98–107)
CO2: 22 MMOL/L (ref 22–29)
CREAT SERPL-MCNC: 0.6 MG/DL (ref 0.5–1)
EOSINOPHILS ABSOLUTE: 0.08 E9/L (ref 0.05–0.5)
EOSINOPHILS RELATIVE PERCENT: 1.3 % (ref 0–6)
GFR SERPL CREATININE-BSD FRML MDRD: >60 ML/MIN/1.73
GLUCOSE BLD-MCNC: 221 MG/DL (ref 74–99)
HCT VFR BLD CALC: 25.5 % (ref 34–48)
HEMOGLOBIN: 8.5 G/DL (ref 11.5–15.5)
IMMATURE GRANULOCYTES #: 0.02 E9/L
IMMATURE GRANULOCYTES %: 0.3 % (ref 0–5)
LYMPHOCYTES ABSOLUTE: 1.47 E9/L (ref 1.5–4)
LYMPHOCYTES RELATIVE PERCENT: 23.7 % (ref 20–42)
MCH RBC QN AUTO: 31 PG (ref 26–35)
MCHC RBC AUTO-ENTMCNC: 33.3 % (ref 32–34.5)
MCV RBC AUTO: 93.1 FL (ref 80–99.9)
METER GLUCOSE: 167 MG/DL (ref 74–99)
METER GLUCOSE: 219 MG/DL (ref 74–99)
METER GLUCOSE: 234 MG/DL (ref 74–99)
METER GLUCOSE: 240 MG/DL (ref 74–99)
METER GLUCOSE: 254 MG/DL (ref 74–99)
MONOCYTES ABSOLUTE: 0.51 E9/L (ref 0.1–0.95)
MONOCYTES RELATIVE PERCENT: 8.2 % (ref 2–12)
NEUTROPHILS ABSOLUTE: 4.09 E9/L (ref 1.8–7.3)
NEUTROPHILS RELATIVE PERCENT: 65.9 % (ref 43–80)
PDW BLD-RTO: 13.1 FL (ref 11.5–15)
PLATELET # BLD: 183 E9/L (ref 130–450)
PMV BLD AUTO: 9.9 FL (ref 7–12)
POTASSIUM SERPL-SCNC: 3.2 MMOL/L (ref 3.5–5)
RBC # BLD: 2.74 E12/L (ref 3.5–5.5)
SODIUM BLD-SCNC: 135 MMOL/L (ref 132–146)
TOTAL PROTEIN: 5.5 G/DL (ref 6.4–8.3)
WBC # BLD: 6.2 E9/L (ref 4.5–11.5)

## 2023-02-14 PROCEDURE — 6370000000 HC RX 637 (ALT 250 FOR IP): Performed by: INTERNAL MEDICINE

## 2023-02-14 PROCEDURE — 80053 COMPREHEN METABOLIC PANEL: CPT

## 2023-02-14 PROCEDURE — 99232 SBSQ HOSP IP/OBS MODERATE 35: CPT | Performed by: PHYSICIAN ASSISTANT

## 2023-02-14 PROCEDURE — 2500000003 HC RX 250 WO HCPCS: Performed by: INTERNAL MEDICINE

## 2023-02-14 PROCEDURE — 85025 COMPLETE CBC W/AUTO DIFF WBC: CPT

## 2023-02-14 PROCEDURE — 2580000003 HC RX 258: Performed by: INTERNAL MEDICINE

## 2023-02-14 PROCEDURE — 6360000002 HC RX W HCPCS: Performed by: ORTHOPAEDIC SURGERY

## 2023-02-14 PROCEDURE — 1200000000 HC SEMI PRIVATE

## 2023-02-14 PROCEDURE — 82962 GLUCOSE BLOOD TEST: CPT

## 2023-02-14 PROCEDURE — 36415 COLL VENOUS BLD VENIPUNCTURE: CPT

## 2023-02-14 RX ADMIN — POTASSIUM BICARBONATE 40 MEQ: 782 TABLET, EFFERVESCENT ORAL at 13:47

## 2023-02-14 RX ADMIN — DEXTROSE AND SODIUM CHLORIDE: 5; 450 INJECTION, SOLUTION INTRAVENOUS at 21:52

## 2023-02-14 RX ADMIN — POLYETHYLENE GLYCOL 3350 17 G: 17 POWDER, FOR SOLUTION ORAL at 08:30

## 2023-02-14 RX ADMIN — HYDROCODONE BITARTRATE AND ACETAMINOPHEN 1 TABLET: 5; 325 TABLET ORAL at 18:11

## 2023-02-14 RX ADMIN — MORPHINE SULFATE 2 MG: 2 INJECTION, SOLUTION INTRAMUSCULAR; INTRAVENOUS at 11:41

## 2023-02-14 RX ADMIN — LEVOTHYROXINE SODIUM 25 MCG: 0.05 TABLET ORAL at 05:43

## 2023-02-14 RX ADMIN — INSULIN LISPRO 4 UNITS: 100 INJECTION, SOLUTION INTRAVENOUS; SUBCUTANEOUS at 09:09

## 2023-02-14 RX ADMIN — INSULIN LISPRO 2 UNITS: 100 INJECTION, SOLUTION INTRAVENOUS; SUBCUTANEOUS at 13:46

## 2023-02-14 RX ADMIN — ACETAMINOPHEN 500 MG: 500 TABLET ORAL at 21:44

## 2023-02-14 RX ADMIN — AMIODARONE HYDROCHLORIDE 200 MG: 200 TABLET ORAL at 08:29

## 2023-02-14 RX ADMIN — LORAZEPAM 0.5 MG: 0.5 TABLET ORAL at 21:44

## 2023-02-14 RX ADMIN — ENOXAPARIN SODIUM 60 MG: 100 INJECTION SUBCUTANEOUS at 08:29

## 2023-02-14 RX ADMIN — ENOXAPARIN SODIUM 60 MG: 100 INJECTION SUBCUTANEOUS at 21:45

## 2023-02-14 ASSESSMENT — PAIN DESCRIPTION - ORIENTATION
ORIENTATION: RIGHT

## 2023-02-14 ASSESSMENT — PAIN DESCRIPTION - LOCATION
LOCATION: HIP
LOCATION: LEG;HIP
LOCATION: HIP

## 2023-02-14 ASSESSMENT — PAIN DESCRIPTION - DESCRIPTORS
DESCRIPTORS: CRAMPING;DISCOMFORT;DULL
DESCRIPTORS: ACHING;DULL;CRAMPING
DESCRIPTORS: DISCOMFORT;HEAVINESS;JABBING

## 2023-02-14 ASSESSMENT — PAIN SCALES - GENERAL
PAINLEVEL_OUTOF10: 4
PAINLEVEL_OUTOF10: 8
PAINLEVEL_OUTOF10: 8
PAINLEVEL_OUTOF10: 4
PAINLEVEL_OUTOF10: 4

## 2023-02-14 ASSESSMENT — PAIN SCALES - WONG BAKER
WONGBAKER_NUMERICALRESPONSE: 4
WONGBAKER_NUMERICALRESPONSE: 4
WONGBAKER_NUMERICALRESPONSE: 0
WONGBAKER_NUMERICALRESPONSE: 4

## 2023-02-14 NOTE — PROGRESS NOTES
Called the patient's son and discussed the patient's current plan. We will plan to get her into a bedside chair tomorrow with therapy. If the patient is unable to tolerate and has significant pain with either that or activities such as hygiene we will still consider operative intervention and fixation of her fracture. if we do decide and agreed upon with the family that surgery is the best option based on how she does with therapy tomorrow we would plan for surgery on 2/16/2023.

## 2023-02-14 NOTE — PROGRESS NOTES
PROGRESS NOTE       PATIENT PROBLEM LIST:  Patient Active Problem List   Diagnosis Code    Seizure-like activity ([facility] [address] R56.9    Closed fracture of right hip ([facility] [address] S72.001A    Chronic deep vein thrombosis (DVT) of left lower extremity (HCC) I82.502    Atrial fibrillation ([facility] I48.91       SUBJECTIVE:  Arcadio Hagan is resting quietly in bed voicing no complaints at this time. She remains intermittently in atrial fibrillation and apixaban is on hold presently. REVIEW OF SYSTEMS:  General ROS: negative for - fatigue, malaise,  weight gain or weight loss  Psychological ROS: negative for - anxiety , depression  Ophthalmic ROS: negative for - decreased vision or visual distortion. ENT ROS: negative  Allergy and Immunology ROS: negative  Hematological and Lymphatic ROS: negative  Endocrine: no heat or cold intolerance and no polyphagia, polydipsia, or polyuria  Respiratory ROS: negative for - hemoptysis, pleuritic pain, and wheezing  Cardiovascular ROS: positive for - irregular heartbeat. Gastrointestinal ROS: no abdominal pain, change in bowel habits, or black or bloody stools  Genito-Urinary ROS: no nocturia, dysuria, trouble voiding, frequency or hematuria  Musculoskeletal ROS: negative for- myalgias, arthralgias, or claudication  Neurological ROS: no TIA or stroke symptoms otherwise no significant change in symptoms or problems since yesterday as documented in previous progress notes.     SCHEDULED MEDICATIONS:   enoxaparin  60 mg SubCUTAneous BID    levothyroxine  25 mcg Oral Daily    sennosides  10 mL Oral Daily    insulin lispro  0-8 Units SubCUTAneous TID WC    insulin lispro  0-4 Units SubCUTAneous Nightly    polyethylene glycol  17 g Oral Daily    amiodarone  200 mg Oral Daily       VITAL SIGNS:                                                                                                                          /64   Pulse 84   Temp 98.2 °F (36.8 °C) (Axillary)   Resp 17   Ht 5' 5\" (1.651 m)   Wt 133 lb (60.3 kg)   SpO2 93%   BMI 22.13 kg/m²   Patient Vitals for the past 96 hrs (Last 3 readings):   Weight   02/14/23 1100 133 lb (60.3 kg)     OBJECTIVE:    HEENT: PERRL, EOM  Intact; sclera non-icteric, conjunctiva pink. Carotids are brisk in upstroke with normal contour. No carotid bruits. Normal jugular venous pulsation at 45°. No palpable cervical nor supraclavicular nodes. Thyroid not palpable. Trachea midline. Chest: Even excursion  Lungs: CTA B, no expiratory wheezes or rhonchi, no decreased tactile fremitus without inspiratory rales. Heart: Regular  rhythm; S1 > S2, no gallop or murmur. No clicks, rub, palpable thrills   or heaves. PMI nondisplaced, 5th intercostal space MCL. Abdomen: Soft, nontender, nondistended,  mildly protuberant, no masses or organomegaly. Bowel sounds active. Extremities: Without clubbing, cyanosis or edema. Pulses present 3+ upper extermities bilaterally; present 1+ DP  bilaterally and present 1+ PT bilaterally. Data:   Scheduled Meds: Reviewed  Continuous Infusions:    dextrose      dextrose 5 % and 0.45 % NaCl 75 mL/hr at 02/13/23 1157       Intake/Output Summary (Last 24 hours) at 2/13/2023 2032  Last data filed at 2/13/2023 1918  Gross per 24 hour   Intake 180 ml   Output 1475 ml   Net -1295 ml     CBC:   Recent Labs     02/12/23  0456 02/13/23  0501   WBC 6.2 6.5   HGB 9.2* 9.0*   HCT 27.6* 27.1*    197     BMP:  Recent Labs     02/12/23  0456 02/13/23  0501    140   K 3.5 3.7   * 106   CO2 25 23   BUN 22 15   CREATININE 0.7 0.9   LABGLOM >60 >60     ABGs:   Lab Results   Component Value Date/Time    PH 7.39 12/05/2011 06:10 PM     INR:   Recent Labs     02/11/23 2036   INR 1.7     PRO-BNP:   Lab Results   Component Value Date    PROBNP 130 02/11/2023    PROBNP 118 04/11/2017      TSH:   Lab Results   Component Value Date    TSH 10.940 (H) 02/11/2023      Cardiac Injury Profile: No results for input(s): TROPHS in the last 72 hours. Lipid Profile:   Lab Results   Component Value Date/Time    TRIG 146 07/28/2022 03:30 PM    HDL 41 07/28/2022 03:30 PM    LDLCALC 154 07/28/2022 03:30 PM    CHOL 224 07/28/2022 03:30 PM      Hemoglobin A1C: No components found for: HGBA1C      RAD:   No results found. EKG: See Report  Echo: See Report      IMPRESSIONS:  Patient Active Problem List   Diagnosis Code    Seizure-like activity ([facility] [address] R56.9    Closed fracture of right hip ([address] S72.001A    Chronic deep vein thrombosis (DVT) of left lower extremity ([address] I82.502    Atrial fibrillation ([address] I48.91       RECOMMENDATIONS:  Hopefully will be able to proceed with surgery but will bridge utilizing either heparin or enoxaparin as she remains at risk for systemic embolization and only needs to be off apixaban for 48 hours preoperatively. I have spent more than 25 minutes face to face with Chucho Chavez and reviewing notes and laboratory data, with greater than 50% of this time instructing and counseling the patient face to face regarding my findings and recommendations and I have answered all questions as posed to me by Ms. Sasha Montoya. Mikki William, DO FACP,FACC,FSCAI      NOTE:  This report was transcribed using voice recognition software.   Every effort was made to ensure accuracy; however, inadvertent computerized transcription errors may be present

## 2023-02-14 NOTE — CARE COORDINATION
2/14/2023 social work transition of care planning  Sw notified [facility]. Sw followed up with pt's dtr at bedside for other luann choices. She will review list and provide alternate choices. Awaiting ortho poc.   Electronically signed by CRISTAL Hernandez on 2/14/2023 at 12:35 PM

## 2023-02-14 NOTE — CARE COORDINATION
Call made to Andrei Lamar at [facility] to check if beds are available at [facility]. Bed available, referral made and she will follow-up regarding acceptance. Plan is for the patient to return to [facility] pending she is able to tolerate sitting in a wheelchair. 10:20A  Received a return call from Andrei Lamar at [facility] and she reports the bed is no longer available, other [facility] facilities does not accept [employer].     Karen Quintero, MSW, LSW [phone]

## 2023-02-14 NOTE — PROGRESS NOTES
Vascular Surgery Progress Note    CC: RLE DVT    HISTORY:  The patient is awake, alert. Confused at baseline. Endorses pain. Daughter and  at bedside stating they would like to proceed with orthopedic intervention even considering the risks as her pain is not allowing for a good quality of life. IMPRESSION:  Periprosthetic hip fracture. History of left lower extremity DVT. New US demonstrating left lower extremity nonocclusive PTV DVT diagnosed in 1/2023    PLAN:  Given the distal location of the recently imaged DVT, would not recommend VCF at this time as her risk of associated PE is low and insertion of VCF is not without risk. Also, uncertain if this was an acute event vs chronic scarring from her previous DVT. This was discussed with the patient's daughter. Recommend initiation of AC as soon as safely possible following her surgery in consideration of her a fib even more so than her tibial DVT. Vascular will sign off; please re-consult for any concerns.     Plan discussed with Dr Marie Zhao    Patient Active Problem List   Diagnosis Code    Seizure-like activity ([facility] [address] R56.9    Closed fracture of right hip ([facility] [address] S72.001A    Chronic deep vein thrombosis (DVT) of left lower extremity (HCC) I82.502    Atrial fibrillation (HCC) I48.91       Current Medications:    dextrose      dextrose 5 % and 0.45 % NaCl 75 mL/hr at 02/13/23 1157      morphine (PF), acetaminophen, HYDROcodone 5 mg - acetaminophen, LORazepam, glucose, dextrose bolus **OR** dextrose bolus, glucagon (rDNA), dextrose, trimethobenzamide    enoxaparin  60 mg SubCUTAneous BID    levothyroxine  25 mcg Oral Daily    sennosides  10 mL Oral Daily    insulin lispro  0-8 Units SubCUTAneous TID WC    insulin lispro  0-4 Units SubCUTAneous Nightly    polyethylene glycol  17 g Oral Daily    amiodarone  200 mg Oral Daily          PHYSICAL EXAM:    Vitals:    02/14/23 0830   BP: 127/64   Pulse: 84   Resp: 18   Temp: 98.2 °F (36.8 °C)     CONSTITUTIONAL: awake, alert, cooperative, no apparent distress, frail appearing  LUNGS:  no increased work of breathing, good air exchange  CARDIOVASCULAR: regular rate, irregular rhythm  ABDOMEN:  soft, non-distended  LE: knee immobilizer to right knee. feet warm and well perfused.  1+ DP pulses b/l    LABS:    Lab Results   Component Value Date    WBC 6.2 02/14/2023    HGB 8.5 (L) 02/14/2023    HCT 25.5 (L) 02/14/2023     02/14/2023    PROTIME 18.5 (H) 02/11/2023    INR 1.7 02/11/2023    APTT 32.0 02/11/2023    K 3.2 (L) 02/14/2023    BUN 10 02/14/2023    CREATININE 0.6 02/14/2023       RADIOLOGY:

## 2023-02-14 NOTE — PROGRESS NOTES
[facility]  Internal [facility]  Progress Notes      CHIEF COMPLAINT: Patient fell at nursing facility      HISTORY OF PRESENT ILLNESS:   2023   Patient was seen on the floor earlier this morning at the main campus of [facility] with the ER physician at the time of admission  Data reviewed in detail with her 3 sons at bedside and a registered nurse  Patient currently living in a nursing facility due to advanced dementia  Presented to ER after sustaining a mechanical fall that resulted in right hip periprosthetic fracture  Admitted for further management  Patient was on Eliquis for atrial fibrillation paroxysmal and recent onset of DVT right lower extremity  Pain control adequate  2023  Patient was seen on the floor earlier this morning  Pain control adequate  Data reviewed in detail  2023  Patient was seen on the floor earlier this morning  Vascular input appreciated  No plans for IVC filter  Pain control adequate  Past Medical History:    Past Medical History:   Diagnosis Date    Diabetes mellitus ([facility] [address]     DVT (deep venous thrombosis) ([facility] [address] 2011    left leg    Fall     recent, falls x3    Hypertension        Past Surgical History:    Past Surgical History:   Procedure Laterality Date     SECTION      x4    COLONOSCOPY      HIP SURGERY      right    KNEE SURGERY      left       Medications Prior to Admission:    Medications Prior to Admission: polyethylene glycol (GLYCOLAX) 17 GM/SCOOP powder, Take 17 g by mouth daily  levothyroxine (SYNTHROID) 25 MCG tablet, Take 25 mcg by mouth Daily  senna (SENOKOT) 8.8 MG/5ML SYRP syrup, Take 10 mLs by mouth daily  LORazepam (ATIVAN) 0.5 MG tablet, Take 0.5 mg by mouth every 6 hours as needed for Anxiety. [DISCONTINUED] insulin glargine (LANTUS) 100 UNIT/ML injection vial, Inject 18 Units into the skin nightly  amiodarone (PACERONE) 100 MG tablet, Take 1 tablet by mouth in the morning.  (Patient not taking: Reported on 2/11/2023)  losartan (COZAAR) 100 MG tablet, Take 1 tablet by mouth in the morning. (Patient not taking: Reported on 2/11/2023)  docusate sodium (COLACE) 150 MG/15ML liquid, Take 10 mLs by mouth in the morning. (Patient not taking: Reported on 2/11/2023)  levETIRAcetam (KEPPRA) 500 MG tablet, Take 1 tablet by mouth in the morning and 1 tablet before bedtime. (Patient not taking: Reported on 2/11/2023)  [DISCONTINUED] apixaban (ELIQUIS) 2.5 MG TABS tablet, Take 1 tablet by mouth in the morning and 1 tablet before bedtime. metFORMIN (GLUCOPHAGE) 500 MG tablet, Take 500 mg by mouth daily (with breakfast) (Patient not taking: Reported on 2/11/2023)    Allergies:    Sulfa antibiotics    Social History:    reports that she has never smoked. She has never used smokeless tobacco. She reports current alcohol use of about 1.0 standard drink per week. She reports that she does not use drugs. Family History:   family history is not on file. REVIEW OF SYSTEMS:  As above in the HPI, otherwise negative    PHYSICAL EXAM:    Vitals:  /64   Pulse 84   Temp 98.2 °F (36.8 °C) (Axillary)   Resp 17   Ht 5' 5\" (1.651 m)   Wt 133 lb (60.3 kg)   SpO2 93%   BMI 22.13 kg/m²     General:  Awake, alert, disoriented  Chronically ill looking  Thin built  HEENT:  Normocephalic, atraumatic. Pupils equal, round, reactive to light. No scleral icterus. No conjunctival injection No nasal discharge. Neck:  Supple  Heart: Regular rate and rhythm with frequent ectopy  Monitor showing PACs with sinus rhythm  Lungs:  CTA bilaterally, bilat symmetrical expansion, no wheeze, rales, or rhonchi  Abdomen:   Bowel sounds present, soft, nontender, no masses, no organomegaly, no peritoneal signs  Extremities:  No clubbing, cyanosis, or edema  Right lower extremity externally rotated with hip tenderness  Skin:  Warm and dry, no open lesions or rash  Neuro:  Cranial nerves 2-12 intact, no focal deficits  Breast: deferred  Rectal: deferred  Genitalia:  deferred    LABS:  Data reviewed      ASSESSMENT:      Principal Problem:    Closed fracture of right hip (HCC)  Periprosthetic  Mechanical fall  DVT right lower extremity 2 weeks ago  Paroxysmal atrial fibrillation currently in sinus rhythm  Advanced dementia        PLAN:  Pain control  Hold anticoagulants in anticipation of surgical intervention  Consult vascular surgery/input appreciated  Cardiology consult for preop assessment  Questions answered to the family satisfaction    Jani Sesay MD  12:26 PM  2/14/2023

## 2023-02-14 NOTE — PROGRESS NOTES
Department of Orthopedic Surgery  Resident Progress Note      SUBJECTIVE  Patient seen and examined at bedside. Appears somewhat confused this AM. No acute overnight event per nursing. OBJECTIVE    Physical    VITALS:  /66   Pulse 85   Temp 97.3 °F (36.3 °C) (Oral)   Resp 18   SpO2 93%     In bed, resting comfortably    MUSCULOSKELETAL:   Right lower Extremity:  Knee immobilizer in place to the right knee. Extremity shortened and externally rotated  +Log roll and + Heel Strike  pain with palpation about the lateral aspect of the femur and hip. Compartments soft and compressible, calf non-tender  +DP & PT pulses, Brisk Cap refill, Toes warm and perfused  Unable to assess sensory and motor function due to patient limited response    Data    CBC:   Lab Results   Component Value Date/Time    WBC 6.5 02/13/2023 05:01 AM    RBC 2.90 02/13/2023 05:01 AM    HGB 9.0 02/13/2023 05:01 AM    HCT 27.1 02/13/2023 05:01 AM    MCV 93.4 02/13/2023 05:01 AM    MCH 31.0 02/13/2023 05:01 AM    MCHC 33.2 02/13/2023 05:01 AM    RDW 13.2 02/13/2023 05:01 AM     02/13/2023 05:01 AM    MPV 10.0 02/13/2023 05:01 AM     PT/INR:    Lab Results   Component Value Date/Time    PROTIME 18.5 02/11/2023 08:36 PM    PROTIME 15.6 12/07/2011 04:37 AM    INR 1.7 02/11/2023 08:36 PM       Labs  No results for input(s): BC, BLOODCULT2 in the last 72 hours. No results for input(s): CXSURG in the last 72 hours. ASSESSMENT  Closed, right periprosthetic hip fracture    PLAN    Continue nonweightbearing right lower extremity  Currently recommending nonoperative option seeing patient overall status. PT/OT-fall precautions-we will assess mobility and transfer from bed to chair.  Trial today 2/14   DVT prophylaxis  Pain management

## 2023-02-15 LAB
METER GLUCOSE: 157 MG/DL (ref 74–99)
METER GLUCOSE: 214 MG/DL (ref 74–99)
METER GLUCOSE: 216 MG/DL (ref 74–99)
METER GLUCOSE: 220 MG/DL (ref 74–99)

## 2023-02-15 PROCEDURE — 2500000003 HC RX 250 WO HCPCS: Performed by: INTERNAL MEDICINE

## 2023-02-15 PROCEDURE — 6370000000 HC RX 637 (ALT 250 FOR IP): Performed by: INTERNAL MEDICINE

## 2023-02-15 PROCEDURE — 6360000002 HC RX W HCPCS: Performed by: ORTHOPAEDIC SURGERY

## 2023-02-15 PROCEDURE — 2709999900 HC NON-CHARGEABLE SUPPLY: Performed by: ORTHOPAEDIC SURGERY

## 2023-02-15 PROCEDURE — 1200000000 HC SEMI PRIVATE

## 2023-02-15 PROCEDURE — 82962 GLUCOSE BLOOD TEST: CPT

## 2023-02-15 PROCEDURE — 2580000003 HC RX 258: Performed by: INTERNAL MEDICINE

## 2023-02-15 RX ADMIN — POLYETHYLENE GLYCOL 3350 17 G: 17 POWDER, FOR SOLUTION ORAL at 09:47

## 2023-02-15 RX ADMIN — HYDROCODONE BITARTRATE AND ACETAMINOPHEN 1 TABLET: 5; 325 TABLET ORAL at 11:50

## 2023-02-15 RX ADMIN — HYDROCODONE BITARTRATE AND ACETAMINOPHEN 1 TABLET: 5; 325 TABLET ORAL at 18:01

## 2023-02-15 RX ADMIN — INSULIN LISPRO 2 UNITS: 100 INJECTION, SOLUTION INTRAVENOUS; SUBCUTANEOUS at 18:02

## 2023-02-15 RX ADMIN — LEVOTHYROXINE SODIUM 25 MCG: 0.05 TABLET ORAL at 06:01

## 2023-02-15 RX ADMIN — ENOXAPARIN SODIUM 60 MG: 100 INJECTION SUBCUTANEOUS at 09:46

## 2023-02-15 RX ADMIN — INSULIN LISPRO 2 UNITS: 100 INJECTION, SOLUTION INTRAVENOUS; SUBCUTANEOUS at 09:54

## 2023-02-15 RX ADMIN — AMIODARONE HYDROCHLORIDE 200 MG: 200 TABLET ORAL at 09:47

## 2023-02-15 RX ADMIN — MORPHINE SULFATE 2 MG: 2 INJECTION, SOLUTION INTRAMUSCULAR; INTRAVENOUS at 00:56

## 2023-02-15 RX ADMIN — ACETAMINOPHEN 500 MG: 500 TABLET ORAL at 21:50

## 2023-02-15 RX ADMIN — INSULIN LISPRO 2 UNITS: 100 INJECTION, SOLUTION INTRAVENOUS; SUBCUTANEOUS at 11:55

## 2023-02-15 RX ADMIN — DEXTROSE AND SODIUM CHLORIDE: 5; 450 INJECTION, SOLUTION INTRAVENOUS at 22:00

## 2023-02-15 ASSESSMENT — PAIN DESCRIPTION - LOCATION
LOCATION: HIP
LOCATION: LEG

## 2023-02-15 ASSESSMENT — PAIN SCALES - GENERAL
PAINLEVEL_OUTOF10: 5
PAINLEVEL_OUTOF10: 3

## 2023-02-15 ASSESSMENT — PAIN DESCRIPTION - DESCRIPTORS
DESCRIPTORS: ACHING;DULL;CRAMPING
DESCRIPTORS: ACHING
DESCRIPTORS: JABBING
DESCRIPTORS: ACHING

## 2023-02-15 ASSESSMENT — PAIN DESCRIPTION - ORIENTATION
ORIENTATION: RIGHT

## 2023-02-15 NOTE — PROGRESS NOTES
Date: 2/15/2023       Patient Name: Niels Benítez  : 5/10/1931      MRN: [id number]    PT order received. Chart has been reviewed. Pt asleep on arrival with family present. PT evaluation will be on hold - family present and adamantly refusing therapy for pt at this time d/t severe pain with all activity. Will continue to follow and complete evaluation at later time.      Abdullahi Morris, PT

## 2023-02-15 NOTE — PROGRESS NOTES
Department of Orthopedic Surgery  Resident Progress Note      SUBJECTIVE  Patient seen and examined at bedside. Appears somewhat confused this AM. No acute overnight event per nursing. OBJECTIVE    Physical    VITALS:  /78   Pulse 75   Temp 98.3 °F (36.8 °C) (Temporal)   Resp 18   Ht 5' 5\" (1.651 m)   Wt 133 lb (60.3 kg)   SpO2 94%   BMI 22.13 kg/m²     In bed, resting comfortably, arousable but falls back to sleep easily    MUSCULOSKELETAL:   Right lower Extremity:  Knee immobilizer in place to the right knee. Extremity shortened and externally rotated  +Log roll and + Heel Strike  pain with palpation about the lateral aspect of the femur and hip. Compartments soft and compressible, calf non-tender  +DP & PT pulses, Brisk Cap refill, Toes warm and perfused  Unable to assess sensory and motor function due to patient limited response    Data    CBC:   Lab Results   Component Value Date/Time    WBC 6.2 02/14/2023 05:55 AM    RBC 2.74 02/14/2023 05:55 AM    HGB 8.5 02/14/2023 05:55 AM    HCT 25.5 02/14/2023 05:55 AM    MCV 93.1 02/14/2023 05:55 AM    MCH 31.0 02/14/2023 05:55 AM    MCHC 33.3 02/14/2023 05:55 AM    RDW 13.1 02/14/2023 05:55 AM     02/14/2023 05:55 AM    MPV 9.9 02/14/2023 05:55 AM     PT/INR:    Lab Results   Component Value Date/Time    PROTIME 18.5 02/11/2023 08:36 PM    PROTIME 15.6 12/07/2011 04:37 AM    INR 1.7 02/11/2023 08:36 PM       Labs  No results for input(s): BC, BLOODCULT2 in the last 72 hours. No results for input(s): CXSURG in the last 72 hours. ASSESSMENT  Closed, right periprosthetic hip fracture    PLAN    Continue nonweightbearing right lower extremity  Currently recommending nonoperative option seeing patient overall status. PT/OT-fall precautions-we will assess mobility and transfer from bed to chair.  Trial today 2/15 with PT to mobilize to chair  DVT prophylaxis  Pain management  Discussed with attending  If patient fails physical therapy then would consider ORIF of her right periprosthetic hip fracture tomorrow with Dr. Bayron Mesa. Patient seen and examined. Patient still in significant pain with any movement of the right lower extremity. Talk to the family in detail once again. We talked about the fact that it is hard to mobilize her even into a chair. They would like to go forward with operative fixation. Explained the stem would still be loose I do not think it is safe to revise the femoral stem. They understand and agree. I explained there is a chance that she could pass when the operating room. They are well aware of this but do not want her pain which I think is very reasonable. We will take her for open reduction internal fixation of her right proximal femur. Understand the risk of surgery including death, nonunion, hardware failure, continued pain, deep venous fibrosis, wound healing issues, infection, and any other unforeseeable complications. They said to go forward with the procedure.

## 2023-02-15 NOTE — PROGRESS NOTES
PROGRESS NOTE       PATIENT PROBLEM LIST:  Patient Active Problem List   Diagnosis Code    Seizure-like activity ([facility] R56.9    Closed fracture of right hip ([facility] S72.001A    Chronic deep vein thrombosis (DVT) of left lower extremity (HCC) I82.502    Atrial fibrillation ([facility] I48.91       SUBJECTIVE:  Ashley Clement is resting quietly in bed voicing no complaints at this time. She remains intermittently in atrial fibrillation and apixaban is on hold presently. REVIEW OF SYSTEMS:  General ROS: negative for - fatigue, malaise,  weight gain or weight loss  Psychological ROS: negative for - anxiety , depression  Ophthalmic ROS: negative for - decreased vision or visual distortion. ENT ROS: negative  Allergy and Immunology ROS: negative  Hematological and Lymphatic ROS: negative  Endocrine: no heat or cold intolerance and no polyphagia, polydipsia, or polyuria  Respiratory ROS: negative for - hemoptysis, pleuritic pain, and wheezing  Cardiovascular ROS: positive for - irregular heartbeat. Gastrointestinal ROS: no abdominal pain, change in bowel habits, or black or bloody stools  Genito-Urinary ROS: no nocturia, dysuria, trouble voiding, frequency or hematuria  Musculoskeletal ROS: negative for- myalgias, arthralgias, or claudication  Neurological ROS: no TIA or stroke symptoms otherwise no significant change in symptoms or problems since yesterday as documented in previous progress notes.     SCHEDULED MEDICATIONS:   enoxaparin  60 mg SubCUTAneous BID    levothyroxine  25 mcg Oral Daily    sennosides  10 mL Oral Daily    insulin lispro  0-8 Units SubCUTAneous TID WC    insulin lispro  0-4 Units SubCUTAneous Nightly    polyethylene glycol  17 g Oral Daily    amiodarone  200 mg Oral Daily       VITAL SIGNS:                                                                                                                          /66   Pulse 77   Temp 98.8 °F (37.1 °C) (Temporal)   Resp 18   Ht 5' 5\" (1.651 m)   Wt 133 lb (60.3 kg)   SpO2 96%   BMI 22.13 kg/m²   Patient Vitals for the past 96 hrs (Last 3 readings):   Weight   02/14/23 1100 133 lb (60.3 kg)     OBJECTIVE:    HEENT: PERRL, EOM  Intact; sclera non-icteric, conjunctiva pink. Carotids are brisk in upstroke with normal contour. No carotid bruits. Normal jugular venous pulsation at 45°. No palpable cervical nor supraclavicular nodes. Thyroid not palpable. Trachea midline. Chest: Even excursion  Lungs: CTA B, no expiratory wheezes or rhonchi, no decreased tactile fremitus without inspiratory rales. Heart: Regular  rhythm; S1 > S2, no gallop or murmur. No clicks, rub, palpable thrills   or heaves. PMI nondisplaced, 5th intercostal space MCL. Abdomen: Soft, nontender, nondistended,  mildly protuberant, no masses or organomegaly. Bowel sounds active. Extremities: Without clubbing, cyanosis or edema. Pulses present 3+ upper extermities bilaterally; present 1+ DP  bilaterally and present 1+ PT bilaterally. Data:   Scheduled Meds: Reviewed  Continuous Infusions:    dextrose      dextrose 5 % and 0.45 % NaCl 75 mL/hr at 02/15/23 0118       Intake/Output Summary (Last 24 hours) at 2/13/2023 2032  Last data filed at 2/13/2023 1918  Gross per 24 hour   Intake 180 ml   Output 1475 ml   Net -1295 ml     CBC:   Recent Labs     02/12/23  0456 02/13/23  0501   WBC 6.2 6.5   HGB 9.2* 9.0*   HCT 27.6* 27.1*    197     BMP:  Recent Labs     02/12/23  0456 02/13/23  0501    140   K 3.5 3.7   * 106   CO2 25 23   BUN 22 15   CREATININE 0.7 0.9   LABGLOM >60 >60     ABGs:   Lab Results   Component Value Date/Time    PH 7.39 12/05/2011 06:10 PM     INR:   No results for input(s): INR in the last 72 hours.     PRO-BNP:   Lab Results   Component Value Date    PROBNP 130 02/11/2023    PROBNP 118 04/11/2017      TSH:   Lab Results   Component Value Date    TSH 10.940 (H) 02/11/2023      Cardiac Injury Profile: No results for input(s): TROPHS in the last 72 hours. Lipid Profile:   Lab Results   Component Value Date/Time    TRIG 146 07/28/2022 03:30 PM    HDL 41 07/28/2022 03:30 PM    LDLCALC 154 07/28/2022 03:30 PM    CHOL 224 07/28/2022 03:30 PM      Hemoglobin A1C: No components found for: HGBA1C      RAD:   No results found. EKG: See Report  Echo: See Report      IMPRESSIONS:  Patient Active Problem List   Diagnosis Code    Seizure-like activity ([address] R56.9    Closed fracture of right hip ([address] S72.001A    Chronic deep vein thrombosis (DVT) of left lower extremity ([address] I82.502    Atrial fibrillation ([address] I48.91       RECOMMENDATIONS:  Hopefully will be able to proceed with surgery but will bridge utilizing either heparin or enoxaparin as she remains at risk for systemic embolization and only needs to be off apixaban for 48 hours preoperatively. I have spent more than 25 minutes face to face with Casper Weems and reviewing notes and laboratory data, with greater than 50% of this time instructing and counseling the patient face to face regarding my findings and recommendations and I have answered all questions as posed to me by Ms. Michael Thompson. Tomás Peralta,  FACP,FACC,FSCAI      NOTE:  This report was transcribed using voice recognition software.   Every effort was made to ensure accuracy; however, inadvertent computerized transcription errors may be present

## 2023-02-15 NOTE — PROGRESS NOTES
Patients daughter is at bedside. Daughter states that she and brothers do not want patient moved to side of bed or up to a chair at this time. States the patient is in too much pain when being moved and is not alert enough right not to be moved.

## 2023-02-15 NOTE — PROGRESS NOTES
Lane Arciniega MD FACP  Internal medicine  Progress Notes      CHIEF COMPLAINT: Patient fell at nursing facility      HISTORY OF PRESENT ILLNESS:   2023   Patient was seen on the floor earlier this morning at the main campus of [facility] with the ER physician at the time of admission  Data reviewed in detail with her 3 sons at bedside and a registered nurse  Patient currently living in a nursing facility due to advanced dementia  Presented to ER after sustaining a mechanical fall that resulted in right hip periprosthetic fracture  Admitted for further management  Patient was on Eliquis for atrial fibrillation paroxysmal and recent onset of DVT right lower extremity  Pain control adequate  2023  Patient was seen on the floor earlier this morning  Pain control adequate  Data reviewed in detail  2023  Patient was seen on the floor earlier this morning  Vascular input appreciated  No plans for IVC filter  Pain control adequate  2/15/2023  Patient was seen on the floor earlier this morning  Somnolent from pain meds  Daughter at bedside  Past Medical History:    Past Medical History:   Diagnosis Date    Diabetes mellitus ([facility]     DVT (deep venous thrombosis) ([facility] [facility] 2011    left leg    Fall     recent, falls x3    Hypertension        Past Surgical History:    Past Surgical History:   Procedure Laterality Date     SECTION      x4    COLONOSCOPY      HIP SURGERY      right    KNEE SURGERY      left       Medications Prior to Admission:    Medications Prior to Admission: polyethylene glycol (GLYCOLAX) 17 GM/SCOOP powder, Take 17 g by mouth daily  levothyroxine (SYNTHROID) 25 MCG tablet, Take 25 mcg by mouth Daily  senna (SENOKOT) 8.8 MG/5ML SYRP syrup, Take 10 mLs by mouth daily  LORazepam (ATIVAN) 0.5 MG tablet, Take 0.5 mg by mouth every 6 hours as needed for Anxiety.   [DISCONTINUED] insulin glargine (LANTUS) 100 UNIT/ML injection vial, Inject 18 Units into the skin nightly  amiodarone (PACERONE) 100 MG tablet, Take 1 tablet by mouth in the morning. (Patient not taking: Reported on 2/11/2023)  losartan (COZAAR) 100 MG tablet, Take 1 tablet by mouth in the morning. (Patient not taking: Reported on 2/11/2023)  docusate sodium (COLACE) 150 MG/15ML liquid, Take 10 mLs by mouth in the morning. (Patient not taking: Reported on 2/11/2023)  levETIRAcetam (KEPPRA) 500 MG tablet, Take 1 tablet by mouth in the morning and 1 tablet before bedtime. (Patient not taking: Reported on 2/11/2023)  [DISCONTINUED] apixaban (ELIQUIS) 2.5 MG TABS tablet, Take 1 tablet by mouth in the morning and 1 tablet before bedtime. metFORMIN (GLUCOPHAGE) 500 MG tablet, Take 500 mg by mouth daily (with breakfast) (Patient not taking: Reported on 2/11/2023)    Allergies:    Sulfa antibiotics    Social History:    reports that she has never smoked. She has never used smokeless tobacco. She reports current alcohol use of about 1.0 standard drink per week. She reports that she does not use drugs. Family History:   family history is not on file. REVIEW OF SYSTEMS:  As above in the HPI, otherwise negative    PHYSICAL EXAM:    Vitals:  /66   Pulse 77   Temp 98.8 °F (37.1 °C) (Temporal)   Resp 18   Ht 5' 5\" (1.651 m)   Wt 133 lb (60.3 kg)   SpO2 96%   BMI 22.13 kg/m²     General:  Awake, alert, disoriented  Chronically ill looking  Thin built  HEENT:  Normocephalic, atraumatic. Pupils equal, round, reactive to light. No scleral icterus. No conjunctival injection No nasal discharge. Neck:  Supple  Heart: Irregular consistent with A-fib  Lungs:  CTA bilaterally, bilat symmetrical expansion, no wheeze, rales, or rhonchi  Abdomen:   Bowel sounds present, soft, nontender, no masses, no organomegaly, no peritoneal signs  Extremities:  No clubbing, cyanosis, or edema  Right lower extremity externally rotated with hip tenderness  Skin:  Warm and dry, no open lesions or rash  Neuro:  Cranial nerves 2-12 intact, no focal deficits  Breast: deferred  Rectal: deferred  Genitalia:  deferred    LABS:  Data reviewed      ASSESSMENT:      Principal Problem:    Closed fracture of right hip (HCC)  Periprosthetic  Mechanical fall  DVT right lower extremity 2 weeks ago  Paroxysmal atrial fibrillation currently in sinus rhythm  Advanced dementia        PLAN:  Pain control  Hold anticoagulants in anticipation of surgical intervention  Consult vascular surgery/input appreciated  Cardiology consult for preop assessment  Questions answered to the family satisfaction    Elias Bautista MD  3:30 PM  2/15/2023

## 2023-02-15 NOTE — PROGRESS NOTES
OT consult received and appreciated. Chart reviewed. Will hold evaluation due to daughter declining patient to be treated due to her pain and lethargy. Would like to speak to physicians regarding surgery . Will evaluate at a later time. Thank you. Loretto Bosworth.  [address]

## 2023-02-15 NOTE — CARE COORDINATION
2/15/2023 social work transition of care planning  Plan is to return to [facility] ([facility]) v snf,pending needs at discharge. Ortho following pt progress with PT. Per ortho note,if sx indicated,possible plan for 2/16. Sw will follow.   Electronically signed by CRISTAL Lowry on 2/15/2023 at 8:09 AM

## 2023-02-16 ENCOUNTER — APPOINTMENT (OUTPATIENT)
Dept: GENERAL RADIOLOGY | Age: 88
DRG: 480 | End: 2023-02-16
Payer: MEDICARE

## 2023-02-16 ENCOUNTER — ANESTHESIA EVENT (OUTPATIENT)
Dept: OPERATING ROOM | Age: 88
End: 2023-02-16
Payer: MEDICARE

## 2023-02-16 ENCOUNTER — ANESTHESIA (OUTPATIENT)
Dept: OPERATING ROOM | Age: 88
End: 2023-02-16
Payer: MEDICARE

## 2023-02-16 LAB
ABO/RH: NORMAL
ANTIBODY SCREEN: NORMAL
METER GLUCOSE: 293 MG/DL (ref 74–99)
METER GLUCOSE: 342 MG/DL (ref 74–99)

## 2023-02-16 PROCEDURE — 2580000003 HC RX 258: Performed by: ORTHOPAEDIC SURGERY

## 2023-02-16 PROCEDURE — 0QS604Z REPOSITION RIGHT UPPER FEMUR WITH INTERNAL FIXATION DEVICE, OPEN APPROACH: ICD-10-PCS | Performed by: ORTHOPAEDIC SURGERY

## 2023-02-16 PROCEDURE — C1769 GUIDE WIRE: HCPCS | Performed by: ORTHOPAEDIC SURGERY

## 2023-02-16 PROCEDURE — 3209999900 FLUORO FOR SURGICAL PROCEDURES

## 2023-02-16 PROCEDURE — 6370000000 HC RX 637 (ALT 250 FOR IP): Performed by: STUDENT IN AN ORGANIZED HEALTH CARE EDUCATION/TRAINING PROGRAM

## 2023-02-16 PROCEDURE — 86901 BLOOD TYPING SEROLOGIC RH(D): CPT

## 2023-02-16 PROCEDURE — 6360000002 HC RX W HCPCS: Performed by: ORTHOPAEDIC SURGERY

## 2023-02-16 PROCEDURE — 2500000003 HC RX 250 WO HCPCS: Performed by: ORTHOPAEDIC SURGERY

## 2023-02-16 PROCEDURE — 2500000003 HC RX 250 WO HCPCS

## 2023-02-16 PROCEDURE — 2580000003 HC RX 258

## 2023-02-16 PROCEDURE — 3700000001 HC ADD 15 MINUTES (ANESTHESIA): Performed by: ORTHOPAEDIC SURGERY

## 2023-02-16 PROCEDURE — C1713 ANCHOR/SCREW BN/BN,TIS/BN: HCPCS | Performed by: ORTHOPAEDIC SURGERY

## 2023-02-16 PROCEDURE — 73502 X-RAY EXAM HIP UNI 2-3 VIEWS: CPT | Performed by: RADIOLOGY

## 2023-02-16 PROCEDURE — 6360000002 HC RX W HCPCS: Performed by: NURSE ANESTHETIST, CERTIFIED REGISTERED

## 2023-02-16 PROCEDURE — 3600000015 HC SURGERY LEVEL 5 ADDTL 15MIN: Performed by: ORTHOPAEDIC SURGERY

## 2023-02-16 PROCEDURE — 2580000003 HC RX 258: Performed by: STUDENT IN AN ORGANIZED HEALTH CARE EDUCATION/TRAINING PROGRAM

## 2023-02-16 PROCEDURE — 2500000003 HC RX 250 WO HCPCS: Performed by: NURSE ANESTHETIST, CERTIFIED REGISTERED

## 2023-02-16 PROCEDURE — 6370000000 HC RX 637 (ALT 250 FOR IP): Performed by: INTERNAL MEDICINE

## 2023-02-16 PROCEDURE — 6360000002 HC RX W HCPCS

## 2023-02-16 PROCEDURE — 27245 TREAT THIGH FRACTURE: CPT | Performed by: ORTHOPAEDIC SURGERY

## 2023-02-16 PROCEDURE — 86900 BLOOD TYPING SEROLOGIC ABO: CPT

## 2023-02-16 PROCEDURE — 1200000000 HC SEMI PRIVATE

## 2023-02-16 PROCEDURE — 2720000010 HC SURG SUPPLY STERILE: Performed by: ORTHOPAEDIC SURGERY

## 2023-02-16 PROCEDURE — 6360000002 HC RX W HCPCS: Performed by: STUDENT IN AN ORGANIZED HEALTH CARE EDUCATION/TRAINING PROGRAM

## 2023-02-16 PROCEDURE — 36415 COLL VENOUS BLD VENIPUNCTURE: CPT

## 2023-02-16 PROCEDURE — C1776 JOINT DEVICE (IMPLANTABLE): HCPCS | Performed by: ORTHOPAEDIC SURGERY

## 2023-02-16 PROCEDURE — 6360000002 HC RX W HCPCS: Performed by: ANESTHESIOLOGY

## 2023-02-16 PROCEDURE — 3600000005 HC SURGERY LEVEL 5 BASE: Performed by: ORTHOPAEDIC SURGERY

## 2023-02-16 PROCEDURE — 2W6SXZZ TRACTION OF RIGHT FOOT: ICD-10-PCS | Performed by: ORTHOPAEDIC SURGERY

## 2023-02-16 PROCEDURE — 2709999900 HC NON-CHARGEABLE SUPPLY: Performed by: ORTHOPAEDIC SURGERY

## 2023-02-16 PROCEDURE — 7100000001 HC PACU RECOVERY - ADDTL 15 MIN: Performed by: ORTHOPAEDIC SURGERY

## 2023-02-16 PROCEDURE — P9016 RBC LEUKOCYTES REDUCED: HCPCS

## 2023-02-16 PROCEDURE — 82962 GLUCOSE BLOOD TEST: CPT

## 2023-02-16 PROCEDURE — 86850 RBC ANTIBODY SCREEN: CPT

## 2023-02-16 PROCEDURE — 86923 COMPATIBILITY TEST ELECTRIC: CPT

## 2023-02-16 PROCEDURE — 7100000000 HC PACU RECOVERY - FIRST 15 MIN: Performed by: ORTHOPAEDIC SURGERY

## 2023-02-16 PROCEDURE — 73552 X-RAY EXAM OF FEMUR 2/>: CPT | Performed by: RADIOLOGY

## 2023-02-16 PROCEDURE — 3700000000 HC ANESTHESIA ATTENDED CARE: Performed by: ORTHOPAEDIC SURGERY

## 2023-02-16 DEVICE — SCREW BNE L30MM DIA3.5MM PROX TIB S STL ST FULL THRD T15: Type: IMPLANTABLE DEVICE | Site: HIP | Status: FUNCTIONAL

## 2023-02-16 DEVICE — IMPLANTABLE DEVICE: Type: IMPLANTABLE DEVICE | Site: HIP | Status: FUNCTIONAL

## 2023-02-16 DEVICE — SCREW BNE L50MM DIA3.5MM PROX TIB S STL ST FULL THRD T15: Type: IMPLANTABLE DEVICE | Site: HIP | Status: FUNCTIONAL

## 2023-02-16 DEVICE — CABLE SURG DIA1.7MM S STL HA CERCLAGE W/ CRMP 29880101S] DEPUY SYNTHES USA]: Type: IMPLANTABLE DEVICE | Site: HIP | Status: FUNCTIONAL

## 2023-02-16 DEVICE — SCREW BNE L34MM DIA4.5MM PROX CORT TIB S STL ST LOK FULL: Type: IMPLANTABLE DEVICE | Site: HIP | Status: FUNCTIONAL

## 2023-02-16 DEVICE — CABLE SURG L750MM DIA1MM S STL SMOOTH W/ CRMP: Type: IMPLANTABLE DEVICE | Site: HIP | Status: FUNCTIONAL

## 2023-02-16 DEVICE — SCREW BNE L40MM DIA4.5MM PROX CORT TIB S STL ST LOK FULL: Type: IMPLANTABLE DEVICE | Site: HIP | Status: FUNCTIONAL

## 2023-02-16 DEVICE — SCREW BNE L24MM DIA3.5MM ANK S STL LOK ST STARDRV RECESS: Type: IMPLANTABLE DEVICE | Site: HIP | Status: FUNCTIONAL

## 2023-02-16 DEVICE — SCREW BNE L36MM DIA4.5MM PROX CORT TIB S STL ST LOK FULL: Type: IMPLANTABLE DEVICE | Site: HIP | Status: FUNCTIONAL

## 2023-02-16 DEVICE — SCREW BNE L40MM DIA3.5MM PROX TIB S STL ST FULL THRD W/ T15: Type: IMPLANTABLE DEVICE | Site: HIP | Status: FUNCTIONAL

## 2023-02-16 RX ORDER — TOBRAMYCIN 1.2 G/30ML
INJECTION, POWDER, LYOPHILIZED, FOR SOLUTION INTRAVENOUS PRN
Status: DISCONTINUED | OUTPATIENT
Start: 2023-02-16 | End: 2023-02-16 | Stop reason: ALTCHOICE

## 2023-02-16 RX ORDER — LABETALOL HYDROCHLORIDE 5 MG/ML
INJECTION, SOLUTION INTRAVENOUS PRN
Status: DISCONTINUED | OUTPATIENT
Start: 2023-02-16 | End: 2023-02-16 | Stop reason: SDUPTHER

## 2023-02-16 RX ORDER — SODIUM CHLORIDE 0.9 % (FLUSH) 0.9 %
5-40 SYRINGE (ML) INJECTION EVERY 12 HOURS SCHEDULED
Status: DISCONTINUED | OUTPATIENT
Start: 2023-02-16 | End: 2023-02-16 | Stop reason: HOSPADM

## 2023-02-16 RX ORDER — SODIUM CHLORIDE 0.9 % (FLUSH) 0.9 %
5-40 SYRINGE (ML) INJECTION PRN
Status: DISCONTINUED | OUTPATIENT
Start: 2023-02-16 | End: 2023-02-16 | Stop reason: HOSPADM

## 2023-02-16 RX ORDER — SODIUM CHLORIDE 9 MG/ML
INJECTION, SOLUTION INTRAVENOUS CONTINUOUS PRN
Status: DISCONTINUED | OUTPATIENT
Start: 2023-02-16 | End: 2023-02-16 | Stop reason: SDUPTHER

## 2023-02-16 RX ORDER — FENTANYL CITRATE 50 UG/ML
INJECTION, SOLUTION INTRAMUSCULAR; INTRAVENOUS PRN
Status: DISCONTINUED | OUTPATIENT
Start: 2023-02-16 | End: 2023-02-16 | Stop reason: SDUPTHER

## 2023-02-16 RX ORDER — OXYCODONE HYDROCHLORIDE AND ACETAMINOPHEN 5; 325 MG/1; MG/1
1 TABLET ORAL EVERY 6 HOURS PRN
Qty: 28 TABLET | Refills: 0 | Status: SHIPPED | OUTPATIENT
Start: 2023-02-16 | End: 2023-02-23

## 2023-02-16 RX ORDER — MORPHINE SULFATE 2 MG/ML
1 INJECTION, SOLUTION INTRAMUSCULAR; INTRAVENOUS EVERY 5 MIN PRN
Status: DISCONTINUED | OUTPATIENT
Start: 2023-02-16 | End: 2023-02-16 | Stop reason: HOSPADM

## 2023-02-16 RX ORDER — PROPOFOL 10 MG/ML
INJECTION, EMULSION INTRAVENOUS PRN
Status: DISCONTINUED | OUTPATIENT
Start: 2023-02-16 | End: 2023-02-16 | Stop reason: SDUPTHER

## 2023-02-16 RX ORDER — LIDOCAINE HYDROCHLORIDE 20 MG/ML
INJECTION, SOLUTION INTRAVENOUS PRN
Status: DISCONTINUED | OUTPATIENT
Start: 2023-02-16 | End: 2023-02-16 | Stop reason: SDUPTHER

## 2023-02-16 RX ORDER — MORPHINE SULFATE 2 MG/ML
INJECTION, SOLUTION INTRAMUSCULAR; INTRAVENOUS
Status: COMPLETED
Start: 2023-02-16 | End: 2023-02-16

## 2023-02-16 RX ORDER — MORPHINE SULFATE 2 MG/ML
2 INJECTION, SOLUTION INTRAMUSCULAR; INTRAVENOUS EVERY 5 MIN PRN
Status: DISCONTINUED | OUTPATIENT
Start: 2023-02-16 | End: 2023-02-16 | Stop reason: HOSPADM

## 2023-02-16 RX ORDER — DEXAMETHASONE SODIUM PHOSPHATE 10 MG/ML
INJECTION INTRAMUSCULAR; INTRAVENOUS PRN
Status: DISCONTINUED | OUTPATIENT
Start: 2023-02-16 | End: 2023-02-16 | Stop reason: SDUPTHER

## 2023-02-16 RX ORDER — SODIUM CHLORIDE 9 MG/ML
INJECTION, SOLUTION INTRAVENOUS PRN
Status: DISCONTINUED | OUTPATIENT
Start: 2023-02-16 | End: 2023-02-16 | Stop reason: HOSPADM

## 2023-02-16 RX ORDER — CEFAZOLIN SODIUM 1 G/3ML
INJECTION, POWDER, FOR SOLUTION INTRAMUSCULAR; INTRAVENOUS PRN
Status: DISCONTINUED | OUTPATIENT
Start: 2023-02-16 | End: 2023-02-16 | Stop reason: SDUPTHER

## 2023-02-16 RX ORDER — ROCURONIUM BROMIDE 10 MG/ML
INJECTION, SOLUTION INTRAVENOUS PRN
Status: DISCONTINUED | OUTPATIENT
Start: 2023-02-16 | End: 2023-02-16 | Stop reason: SDUPTHER

## 2023-02-16 RX ORDER — MEPERIDINE HYDROCHLORIDE 25 MG/ML
12.5 INJECTION INTRAMUSCULAR; INTRAVENOUS; SUBCUTANEOUS EVERY 5 MIN PRN
Status: DISCONTINUED | OUTPATIENT
Start: 2023-02-16 | End: 2023-02-16 | Stop reason: HOSPADM

## 2023-02-16 RX ORDER — ASPIRIN 325 MG
325 TABLET, DELAYED RELEASE (ENTERIC COATED) ORAL DAILY
Qty: 30 TABLET | Refills: 0 | Status: SHIPPED | OUTPATIENT
Start: 2023-02-16 | End: 2023-02-18 | Stop reason: HOSPADM

## 2023-02-16 RX ORDER — ONDANSETRON 2 MG/ML
INJECTION INTRAMUSCULAR; INTRAVENOUS PRN
Status: DISCONTINUED | OUTPATIENT
Start: 2023-02-16 | End: 2023-02-16 | Stop reason: SDUPTHER

## 2023-02-16 RX ADMIN — MORPHINE SULFATE 2 MG: 2 INJECTION, SOLUTION INTRAMUSCULAR; INTRAVENOUS at 12:54

## 2023-02-16 RX ADMIN — DEXAMETHASONE SODIUM PHOSPHATE 10 MG: 10 INJECTION INTRAMUSCULAR; INTRAVENOUS at 11:11

## 2023-02-16 RX ADMIN — FENTANYL CITRATE 50 MCG: 50 INJECTION, SOLUTION INTRAMUSCULAR; INTRAVENOUS at 10:59

## 2023-02-16 RX ADMIN — FENTANYL CITRATE 25 MCG: 50 INJECTION, SOLUTION INTRAMUSCULAR; INTRAVENOUS at 12:12

## 2023-02-16 RX ADMIN — MORPHINE SULFATE 2 MG: 2 INJECTION, SOLUTION INTRAMUSCULAR; INTRAVENOUS at 12:33

## 2023-02-16 RX ADMIN — MORPHINE SULFATE 2 MG: 2 INJECTION, SOLUTION INTRAMUSCULAR; INTRAVENOUS at 12:46

## 2023-02-16 RX ADMIN — FENTANYL CITRATE 25 MCG: 50 INJECTION, SOLUTION INTRAMUSCULAR; INTRAVENOUS at 11:04

## 2023-02-16 RX ADMIN — SUGAMMADEX 60 MG: 100 INJECTION, SOLUTION INTRAVENOUS at 11:43

## 2023-02-16 RX ADMIN — LEVOTHYROXINE SODIUM 25 MCG: 0.05 TABLET ORAL at 05:56

## 2023-02-16 RX ADMIN — MORPHINE SULFATE 2 MG: 2 INJECTION, SOLUTION INTRAMUSCULAR; INTRAVENOUS at 12:22

## 2023-02-16 RX ADMIN — DEXTROSE AND SODIUM CHLORIDE: 5; 450 INJECTION, SOLUTION INTRAVENOUS at 13:50

## 2023-02-16 RX ADMIN — INSULIN LISPRO 6 UNITS: 100 INJECTION, SOLUTION INTRAVENOUS; SUBCUTANEOUS at 17:13

## 2023-02-16 RX ADMIN — ENOXAPARIN SODIUM 60 MG: 100 INJECTION SUBCUTANEOUS at 21:46

## 2023-02-16 RX ADMIN — TRANEXAMIC ACID 1000 MG: 1 INJECTION, SOLUTION INTRAVENOUS at 11:31

## 2023-02-16 RX ADMIN — SODIUM CHLORIDE: 9 INJECTION, SOLUTION INTRAVENOUS at 10:52

## 2023-02-16 RX ADMIN — LIDOCAINE HYDROCHLORIDE 40 MG: 20 INJECTION, SOLUTION INTRAVENOUS at 10:59

## 2023-02-16 RX ADMIN — ROCURONIUM BROMIDE 20 MG: 10 INJECTION, SOLUTION INTRAVENOUS at 11:23

## 2023-02-16 RX ADMIN — CEFAZOLIN 2000 MG: 2 INJECTION, POWDER, FOR SOLUTION INTRAMUSCULAR; INTRAVENOUS at 21:46

## 2023-02-16 RX ADMIN — CEFAZOLIN 2 G: 1 INJECTION, POWDER, FOR SOLUTION INTRAMUSCULAR; INTRAVENOUS at 11:04

## 2023-02-16 RX ADMIN — FENTANYL CITRATE 25 MCG: 50 INJECTION, SOLUTION INTRAMUSCULAR; INTRAVENOUS at 11:06

## 2023-02-16 RX ADMIN — ROCURONIUM BROMIDE 30 MG: 10 INJECTION, SOLUTION INTRAVENOUS at 10:59

## 2023-02-16 RX ADMIN — AMIODARONE HYDROCHLORIDE 200 MG: 200 TABLET ORAL at 08:33

## 2023-02-16 RX ADMIN — ONDANSETRON 4 MG: 2 INJECTION INTRAMUSCULAR; INTRAVENOUS at 11:35

## 2023-02-16 RX ADMIN — PROPOFOL 50 MG: 10 INJECTION, EMULSION INTRAVENOUS at 10:59

## 2023-02-16 RX ADMIN — LABETALOL HYDROCHLORIDE 5 MG: 5 INJECTION INTRAVENOUS at 11:01

## 2023-02-16 ASSESSMENT — PAIN DESCRIPTION - DESCRIPTORS: DESCRIPTORS: ACHING

## 2023-02-16 ASSESSMENT — PAIN DESCRIPTION - LOCATION: LOCATION: HIP

## 2023-02-16 NOTE — OP NOTE
Operative Note      Patient: Carlos Fabian  YOB: 1931  MRN: [id number]    Date of Procedure: 2/16/2023    Pre-Op Diagnosis: RIGHT PERIPROSTHETIC HIP FRACTURE of the total hip in the intertrochanteric region of the femur    Post-Op Diagnosis: Same           Procedure:  Open reduction internal fixation intertrochanteric region around the right total hip arthroplasty with plates and screws and cables        Surgeon(s):  Christiano Guo MD    Assistant:   Resident: Ihsan Soto DO; Danielle Rizzo DO; Adrianna Acosta DO    Anesthesia: General    Estimated Blood Loss (mL): 395     Complications: None    Specimens:   * No specimens in log *    Implants:  * No implants in log *      Drains:   Urinary Catheter 02/11/23 2 Way (Active)   Catheter Indications Prolonged immobilization (e.g. unstable thoracic or lumbar spine, multiple traumatic injuries such as pelvic fractures) 02/16/23 0746   Site Assessment No urethral drainage 02/16/23 0746   Urine Color Yellow 02/16/23 0746   Urine Appearance Clear 02/16/23 0746   Urine Odor Malodorous 02/15/23 1521   Collection Container Standard 02/16/23 0746   Securement Method Securing device (Describe) 02/16/23 0746   Catheter Care  Soap and water 02/16/23 0746   Catheter Best Practices  Drainage tube clipped to bed 02/16/23 0746   Status Draining;Patent 02/16/23 0746   Output (mL) 350 mL 02/15/23 1521       Findings: Near-anatomic reduction used the 5 hole Synthes periprosthetic proximal femur plate. Detailed Description of Procedure:   Patient was brought to the operating room in supine position on hospital room bed. Patient was transferred to the operating room table by multiple individuals in the safe fashion with anesthesia and control patient C-spine area. Patient was then positioned in a right lateral decubitus position. She was subsequently the beanbag. All points pressure identified well-padded. An axillary roll was placed.   The right lower extremity was then sterilely prepped and draped in standard orthopedic fashion. A timeout was performed indicating the appropriate identification of the patient, the procedure to be performed, and the side to be performed upon. This was agreed upon by all individuals in the room. After the timeout was performed the previous total hip incision was marked out and extended with a blue marker. A 10 blade is then used to make an incision. Careful dissection was shot down to the iliotibial band which was incised this fibers. We then went subvastus to expose the femur. The fracture was identified and cleaned out. It was then held in a reduced position with traction while a reduction clamp was put in position. A 1.0 mm Synthes cable was then tensioned and crimped and cut. We then placed the 5 hole prosthetic plate over the proximal femur. Once we checked under fluoroscopy bicortical screws were placed distally 1 right at the tip of the stem and 2 additional bicortical screws. An additional 1.7 mm cable was passed to help with stability of the plate on the femur. Proximally we placed multiple locking screws in the greater trochanter in the metaphysis. Final x-rays showed good positioning of hardware implants in good position. It was then armando irrigated sterile saline. Powdered tobramycin powder was placed in the wound. The vastus and iliotibial band were closed with barbed suture. This was done in a watertight fashion. Irrigation was carried once again the subcutaneous tissues closed with 2-0 Monocryl and skin with staples. Patient's compartments were soft compressible. All bleeding was controlled prior to closure. An occlusive dressing was put in position. Patient was wrapped with a compressive wrap and taken to the PACU in stable condition. Postoperative plan:  1. May mobilize to a chair    2. Strict nonweightbearing right lower extremity    3.   DVT prophylaxis in the form of Eliquis which we was previously prescribed to her due to left lower extremity DVT    4.   See her back in the office in 2 weeks for wound check staple removal and x-rays of the right hip and femur    Electronically signed by Al Dupree MD on 2/16/2023 at 11:43 AM

## 2023-02-16 NOTE — PROGRESS NOTES
Efraín Liz MD FACP  Internal medicine  Progress Notes      CHIEF COMPLAINT: Patient fell at nursing facility      HISTORY OF PRESENT ILLNESS:   2023   Patient was seen on the floor earlier this morning at the main campus of [facility] with the ER physician at the time of admission  Data reviewed in detail with her 3 sons at bedside and a registered nurse  Patient currently living in a nursing facility due to advanced dementia  Presented to ER after sustaining a mechanical fall that resulted in right hip periprosthetic fracture  Admitted for further management  Patient was on Eliquis for atrial fibrillation paroxysmal and recent onset of DVT right lower extremity  Pain control adequate  2023  Patient was seen on the floor earlier this morning  Pain control adequate  Data reviewed in detail  2023  Patient was seen on the floor earlier this morning  Vascular input appreciated  No plans for IVC filter  Pain control adequate  2/15/2023  Patient was seen on the floor earlier this morning  Somnolent from pain meds  Daughter at bedside  2023  Patient was seen on the floor earlier this morning  Somnolent but easily arousable  Pain control adequate  Mental status at her baseline  Past Medical History:    Past Medical History:   Diagnosis Date    Diabetes mellitus ([facility]     DVT (deep venous thrombosis) ([facility] 2011    left leg    Fall     recent, falls x3    Hypertension        Past Surgical History:    Past Surgical History:   Procedure Laterality Date     SECTION      x4    COLONOSCOPY      HIP SURGERY      right    KNEE SURGERY      left       Medications Prior to Admission:    Medications Prior to Admission: polyethylene glycol (GLYCOLAX) 17 GM/SCOOP powder, Take 17 g by mouth daily  levothyroxine (SYNTHROID) 25 MCG tablet, Take 25 mcg by mouth Daily  senna (SENOKOT) 8.8 MG/5ML SYRP syrup, Take 10 mLs by mouth daily  LORazepam (ATIVAN) 0.5 MG tablet, Take 0.5 mg by mouth every 6 hours as needed for Anxiety. [DISCONTINUED] insulin glargine (LANTUS) 100 UNIT/ML injection vial, Inject 18 Units into the skin nightly  amiodarone (PACERONE) 100 MG tablet, Take 1 tablet by mouth in the morning. (Patient not taking: Reported on 2/11/2023)  losartan (COZAAR) 100 MG tablet, Take 1 tablet by mouth in the morning. (Patient not taking: Reported on 2/11/2023)  docusate sodium (COLACE) 150 MG/15ML liquid, Take 10 mLs by mouth in the morning. (Patient not taking: Reported on 2/11/2023)  levETIRAcetam (KEPPRA) 500 MG tablet, Take 1 tablet by mouth in the morning and 1 tablet before bedtime. (Patient not taking: Reported on 2/11/2023)  [DISCONTINUED] apixaban (ELIQUIS) 2.5 MG TABS tablet, Take 1 tablet by mouth in the morning and 1 tablet before bedtime. metFORMIN (GLUCOPHAGE) 500 MG tablet, Take 500 mg by mouth daily (with breakfast) (Patient not taking: Reported on 2/11/2023)    Allergies:    Sulfa antibiotics    Social History:    reports that she has never smoked. She has never used smokeless tobacco. She reports current alcohol use of about 1.0 standard drink per week. She reports that she does not use drugs. Family History:   family history is not on file. REVIEW OF SYSTEMS:  As above in the HPI, otherwise negative    PHYSICAL EXAM:    Vitals:  /81   Pulse 78   Temp 98.8 °F (37.1 °C)   Resp 16   Ht 5' 5\" (1.651 m)   Wt 133 lb (60.3 kg)   SpO2 96%   BMI 22.13 kg/m²     General:  Awake, alert, disoriented  Chronically ill looking  Thin built  HEENT:  Normocephalic, atraumatic. Pupils equal, round, reactive to light. No scleral icterus. No conjunctival injection No nasal discharge. Neck:  Supple  Heart: Irregular consistent with A-fib  Lungs:  CTA bilaterally, bilat symmetrical expansion, no wheeze, rales, or rhonchi  Abdomen:   Bowel sounds present, soft, nontender, no masses, no organomegaly, no peritoneal signs  Extremities:  No clubbing, cyanosis, or edema  Right lower extremity externally rotated with hip tenderness  Skin:  Warm and dry, no open lesions or rash  Neuro:  Cranial nerves 2-12 intact, no focal deficits  Breast: deferred  Rectal: deferred  Genitalia:  deferred    LABS:  Data reviewed      ASSESSMENT:      Principal Problem:    Closed fracture of right hip (HCC)  Periprosthetic  Mechanical fall  DVT right lower extremity 2 weeks ago  Paroxysmal atrial fibrillation currently in sinus rhythm  Advanced dementia        PLAN:  Pain control  Hold anticoagulants in anticipation of surgical intervention  Consult vascular surgery/input appreciated  Cardiology consult for preop assessment  Questions answered to the family satisfaction    Zenon Larson MD  10:27 AM  2/16/2023

## 2023-02-16 NOTE — ANESTHESIA PRE PROCEDURE
Department of Anesthesiology  Preprocedure Note       Name:  Romero Tee   Age:  80 y.o.  :  5/10/1931                                          MRN:  [id number]         Date:  2023      Surgeon: Yoseph Henderson):  Tonya Coon MD    Procedure: Procedure(s):  RIGHT HIP OPEN REDUCTION INTERNAL FIXATION - RIGHT PERIPROSTHETIC HIP FRACTURE, LATERAL POSITION, BEAN BAG, WITH POSSIBLE REVISION - WANTS TO FOLLOW  HIP TOTAL ARTHROPLASTY REVISION    Medications prior to admission:   Prior to Admission medications    Medication Sig Start Date End Date Taking? Authorizing Provider   polyethylene glycol (GLYCOLAX) 17 GM/SCOOP powder Take 17 g by mouth daily    Historical Provider, MD   levothyroxine (SYNTHROID) 25 MCG tablet Take 25 mcg by mouth Daily    Historical Provider, MD   senna (SENOKOT) 8.8 MG/5ML SYRP syrup Take 10 mLs by mouth daily    Historical Provider, MD   LORazepam (ATIVAN) 0.5 MG tablet Take 0.5 mg by mouth every 6 hours as needed for Anxiety. Historical Provider, MD   amiodarone (PACERONE) 100 MG tablet Take 1 tablet by mouth in the morning. Patient not taking: Reported on 2023   Rony Mckinney MD   losartan (COZAAR) 100 MG tablet Take 1 tablet by mouth in the morning. Patient not taking: Reported on 2023   Rony Mckinney MD   docusate sodium (COLACE) 150 MG/15ML liquid Take 10 mLs by mouth in the morning. Patient not taking: Reported on 2023   Rony Mckinney MD   levETIRAcetam (KEPPRA) 500 MG tablet Take 1 tablet by mouth in the morning and 1 tablet before bedtime. Patient not taking: Reported on 2023   Rony Mckinney MD   metFORMIN (GLUCOPHAGE) 500 MG tablet Take 500 mg by mouth daily (with breakfast)  Patient not taking: Reported on 2023    Historical Provider, MD       Current medications:    No current facility-administered medications for this visit. No current outpatient medications on file.      Facility-Administered Medications Ordered in Other Visits   Medication Dose Route Frequency Provider Last Rate Last Admin    tranexamic acid (CYKLOKAPRON) irrigation 1,000 mg  1,000 mg Intra-artICUlar On Call to [address], MD        enoxaparin (LOVENOX) injection 60 mg  60 mg SubCUTAneous BID Stone Lucas    60 mg at 02/15/23 0946    morphine (PF) injection 2 mg  2 mg IntraVENous Q4H PRN Layton Harden MD   2 mg at 02/15/23 0056    acetaminophen (TYLENOL) tablet 500 mg  500 mg Oral Q4H PRN Layton Harden MD   500 mg at 02/15/23 2150    HYDROcodone-acetaminophen ([place]) 5-325 MG per tablet 1 tablet  1 tablet Oral Q6H PRN Layton Harden MD   1 tablet at 02/15/23 1801    levothyroxine (SYNTHROID) tablet 25 mcg  25 mcg Oral Daily Layton Harden MD   25 mcg at 02/16/23 0556    LORazepam (ATIVAN) tablet 0.5 mg  0.5 mg Oral Q6H PRN Layton Harden MD   0.5 mg at 02/14/23 2144    sennosides (SENOKOT) 8.8 MG/5ML syrup 10 mL  10 mL Oral Daily Layton Harden MD   10 mL at 02/13/23 1119    insulin lispro (HUMALOG) injection vial 0-8 Units  0-8 Units SubCUTAneous TID WC Layton Harden MD   2 Units at 02/15/23 1802    insulin lispro (HUMALOG) injection vial 0-4 Units  0-4 Units SubCUTAneous Nightly Layton Harden MD        glucose chewable tablet 16 g  4 tablet Oral PRN Layton Harden MD        dextrose bolus 10% 125 mL  125 mL IntraVENous PRN Layton Harden MD        Or    dextrose bolus 10% 250 mL  250 mL IntraVENous PRN Layton Harden MD        glucagon injection 1 mg  1 mg SubCUTAneous PRN Layton Harden MD        dextrose 10 % infusion   IntraVENous Continuous PRN Layton Harden MD        dextrose 5 % and 0.45 % sodium chloride infusion   IntraVENous Continuous Layton Harden MD 75 mL/hr at 02/15/23 2200 New Bag at 02/15/23 2200    trimethobenzamide (TIGAN) injection 100 mg  100 mg IntraMUSCular Q8H PRN Layton Harden MD        polyethylene glycol (GLYCOLAX) packet 17 g  17 g Oral Daily Layton Harden MD 17 g at 02/15/23 0947    amiodarone (CORDARONE) tablet 200 mg  200 mg Oral Daily Debra Pride DO   200 mg at 23 2969       Allergies: Allergies   Allergen Reactions    Sulfa Antibiotics Other (See Comments)     unknown       Problem List:    Patient Active Problem List   Diagnosis Code    [facility] R56.9    [facility] of [facility] S72.001A    Chronic deep vein thrombosis (DVT) of left lower extremity (HCC) I82.502    Atrial fibrillation ([facility] I48.91       Past Medical History:        Diagnosis Date    [facility]     DVT [facility]) [facility] 2011    left leg    Fall     recent, falls x3    Hypertension        Past Surgical History:        Procedure Laterality Date     SECTION      x4    COLONOSCOPY      HIP SURGERY      right    KNEE SURGERY      left       Social History:    Social History     Tobacco Use    Smoking status: Never    Smokeless tobacco: Never   Substance Use Topics    Alcohol use: Yes     Alcohol/week: 1.0 standard drink     Types: 1 Glasses of wine per week     Comment: rare                                Counseling given: Not Answered      Vital Signs (Current): There were no vitals filed for this visit.                                            BP Readings from Last 3 Encounters:   23 134/81   23 105/68   22 (!) 149/90       NPO Status:                                                                                 BMI:   Wt Readings from Last 3 Encounters:   23 133 lb (60.3 kg)   23 133 lb (60.3 kg)   22 133 lb (60.3 kg)     There is no height or weight on file to calculate BMI.    CBC:   Lab Results   Component Value Date/Time    WBC 6.2 2023 05:55 AM    RBC 2.74 2023 05:55 AM    HGB 8.5 2023 05:55 AM    HCT 25.5 2023 05:55 AM    MCV 93.1 2023 05:55 AM    RDW 13.1 2023 05:55 AM     2023 05:55 AM       CMP:   Lab Results   Component Value Date/Time     02/14/2023 05:55 AM    K 3.2 02/14/2023 05:55 AM    K 3.3 07/16/2022 01:18 PM     02/14/2023 05:55 AM    CO2 22 02/14/2023 05:55 AM    BUN 10 02/14/2023 05:55 AM    CREATININE 0.6 02/14/2023 05:55 AM    GFRAA >60 07/28/2022 03:30 PM    LABGLOM >60 02/14/2023 05:55 AM    GLUCOSE 221 02/14/2023 05:55 AM    GLUCOSE 287 12/05/2011 10:25 PM    PROT 5.5 02/14/2023 05:55 AM    CALCIUM 8.1 02/14/2023 05:55 AM    BILITOT 1.5 02/14/2023 05:55 AM    ALKPHOS 70 02/14/2023 05:55 AM    AST 19 02/14/2023 05:55 AM    ALT 11 02/14/2023 05:55 AM       POC Tests: No results for input(s): POCGLU, POCNA, POCK, POCCL, POCBUN, POCHEMO, POCHCT in the last 72 hours. Coags:   Lab Results   Component Value Date/Time    PROTIME 18.5 02/11/2023 08:36 PM    PROTIME 15.6 12/07/2011 04:37 AM    INR 1.7 02/11/2023 08:36 PM    APTT 32.0 02/11/2023 08:36 PM       HCG (If Applicable): No results found for: PREGTESTUR, PREGSERUM, HCG, HCGQUANT     ABGs: No results found for: PHART, PO2ART, LFY1MHF, NBP4LON, BEART, D6LDGBHI     Type & Screen (If Applicable):  No results found for: LABABO, LABRH    Drug/Infectious Status (If Applicable):  No results found for: HIV, HEPCAB    COVID-19 Screening (If Applicable):   Lab Results   Component Value Date/Time    COVID19 Not Detected 02/11/2023 08:36 AM       ECHO-  Summary   Left ventricle grossly normal in size. Normal LV segmental wall motion. Normal left ventricular wall thickness. Estimated left ventricular ejection fraction is 70±5%. Does not meet 50% diagnostic criteria for diastolic dysfunction. The LAESV Index is >34 ml/m2. Normal right ventricular size and function. TAPSE is low normal   Trace aortic regurgitation is noted. Mild aortic stenosis is present. No evidence to suggest pulmonary hypertension.        Anesthesia Evaluation  Patient summary reviewed and Nursing notes reviewed no history of anesthetic complications:   Airway: Comment: BRYON patient not following verbal commands. Dental:      Comment: BRYON patient not following verbal commands    Pulmonary: breath sounds clear to auscultation  (+) COPD:                             Cardiovascular:    (+) hypertension:, dysrhythmias: atrial fibrillation, CHF:,       ECG reviewed  Rhythm: regular  Rate: normal  Echocardiogram reviewed               ROS comment: EKG  Normal sinus rhythm  Normal ECG  When compared with ECG of 17-JUL-2022 17:35,           Neuro/Psych:   (+) seizures:, neuromuscular disease (sciatica):, dementia            GI/Hepatic/Renal:             Endo/Other:    (+) Diabetes, blood dyscrasia: anticoagulation therapy:., malignancy/cancer. Abdominal:             Vascular:   + DVT, . Other Findings:             Anesthesia Plan      general     ASA 3       Induction: intravenous. MIPS: Postoperative opioids intended and Prophylactic antiemetics administered. Plan discussed with CRNA and attending. Patient unable to participate in discussion/assessment. Noted history of dementia on chart.      Dwayne Owens MD   2/16/2023

## 2023-02-16 NOTE — DISCHARGE INSTRUCTIONS
[facility]) Department of Orthopedic Surgery  [address] Wash Dust, DO Dr. Simeon Leyden, MD Dr. Waunita Amber, MD Ansel Sick, PA-C Aaron Hong DelloStritto PA-C Lesleigh Cords PA-C      Orthopaedics Discharge Instructions   Non weight bearing right lower extremity  Pain medication Per Prescriptions  Contact Office for Medication Refill- [phone]  Office can refill pain med every 7 days  If patient discharging to facility then pain control will be continued per facility physician  Ice to operative/injured site for 15-30 minutes of each hour for next 5 days    Recommend that you continue to ice the area 2-3 times per day after this   Elevate operative/injured limb on 2 pillows at home  Goal is to have limb above the heart if able  Continue DVT Prophylaxis (blood clot prevention) as Prescribed: per trauma surgery   OK to remove Aquacel dressings on post op day seven. OK to shower on post op day 2. No baths or lotions. Follow Up in Office in 2 weeks. Your first post op appointment is often with one of our PAs. Call the office at [phone] or directions or with any questions. Watch for these significant complications. Call physician if they or any other problems occur:  Fever over 101°, redness, swelling or warmth at the operative site  Unrelieved nausea    Foul smelling or cloudy drainage at the operative site   Unrelieved pain    Blood soaked dressing. (Some oozing may be normal)     Numb, pale, blue, cold or tingling extremity    No future appointments. It is the Department of Orthopaedic Trauma's standard of practice that providers will de-escalate(wean) all patients from narcotic(opioid) medications during the post-operative period. We provide multimodal pain control but opioid medications are tapered in all of our patients.   If patient requires referral to pain management for prolonged taper off of opioid pain medication we will facilitate this process.

## 2023-02-16 NOTE — PROGRESS NOTES
PATIENT'S UPPER DENTURE PLACED IN A DENTURE CUP LABELED WITH PATIENT LABELS, SENT TO PACU WITH PATIENT POST PROCEDURE.

## 2023-02-16 NOTE — ANESTHESIA POSTPROCEDURE EVALUATION
Department of Anesthesiology  Postprocedure Note    Patient: Jose Lowery  MRN: [id number]  YOB: 1931  Date of evaluation: 2/16/2023      Procedure Summary     Date: 02/16/23 Room / Location: [facility] OR 09 / [facility]    Anesthesia Start: 1030 Anesthesia Stop: 1216    Procedure: RIGHT HIP OPEN REDUCTION INTERNAL FIXATION - RIGHT PERIPROSTHETIC HIP FRACTURE (Right: Hip) Diagnosis:       Periprosthetic fracture of hip, initial encounter      (RIGHT PERIPROSTHETIC HIP FRACTURE)    Surgeons: Mohan Dc MD Responsible Provider:     Anesthesia Type: general ASA Status: 3          Anesthesia Type: No value filed.     Pamela Phase I:      Pamela Phase II:        Anesthesia Post Evaluation    Patient location during evaluation: PACU  Patient participation: complete - patient participated  Level of consciousness: awake  Pain score: 0  Airway patency: patent  Nausea & Vomiting: no nausea and no vomiting  Complications: no  Cardiovascular status: hemodynamically stable  Respiratory status: acceptable, face mask, nonlabored ventilation and spontaneous ventilation  Hydration status: euvolemic

## 2023-02-16 NOTE — ANESTHESIA PRE PROCEDURE
Department of Anesthesiology  Preprocedure Note       Name:  Ulices Roy   Age:  80 y.o.  :  5/10/1931                                          MRN:  [id number]         Date:  2023      Surgeon: Christa Stevens):  Alejandra Logan MD    Procedure: Procedure(s):  RIGHT HIP OPEN REDUCTION INTERNAL FIXATION - RIGHT PERIPROSTHETIC HIP FRACTURE    Medications prior to admission:   Prior to Admission medications    Medication Sig Start Date End Date Taking? Authorizing Provider   oxyCODONE-acetaminophen (PERCOCET) 5-325 MG per tablet Take 1 tablet by mouth every 6 hours as needed for Pain for up to 7 days. Intended supply: 7 days. Take lowest dose possible to manage pain Max Daily Amount: 4 tablets 23 Yes Raúl Haji DO   aspirin 325 MG EC tablet Take 1 tablet by mouth daily 23 Yes Raúl Haji DO   polyethylene glycol (GLYCOLAX) 17 GM/SCOOP powder Take 17 g by mouth daily   Yes Historical Provider, MD   levothyroxine (SYNTHROID) 25 MCG tablet Take 25 mcg by mouth Daily   Yes Historical Provider, MD   senna (SENOKOT) 8.8 MG/5ML SYRP syrup Take 10 mLs by mouth daily   Yes Historical Provider, MD   LORazepam (ATIVAN) 0.5 MG tablet Take 0.5 mg by mouth every 6 hours as needed for Anxiety. Yes Historical Provider, MD   amiodarone (PACERONE) 100 MG tablet Take 1 tablet by mouth in the morning. Patient not taking: Reported on 2023   Renate Galarza MD   losartan (COZAAR) 100 MG tablet Take 1 tablet by mouth in the morning. Patient not taking: Reported on 2023   Renate Galarza MD   docusate sodium (COLACE) 150 MG/15ML liquid Take 10 mLs by mouth in the morning. Patient not taking: Reported on 2023   Renate Galarza MD   levETIRAcetam (KEPPRA) 500 MG tablet Take 1 tablet by mouth in the morning and 1 tablet before bedtime.   Patient not taking: Reported on 2023   Renate Galarza MD   metFORMIN (GLUCOPHAGE) 500 MG tablet Take 500 mg by mouth daily (with breakfast)  Patient not taking: Reported on 2/11/2023    Historical Provider, MD       Current medications:    Current Facility-Administered Medications   Medication Dose Route Frequency Provider Last Rate Last Admin    ceFAZolin (ANCEF) 1,000 mg in sterile water 10 mL IV syringe  1,000 mg IntraVENous Q8H Geovanny Steven, DO        ceFAZolin (ANCEF) 1,000 mg in sterile water 10 mL IV syringe  1,000 mg IntraVENous Q8H Raúl Haji, DO        enoxaparin (LOVENOX) injection 60 mg  60 mg SubCUTAneous BID Edward Brittany, DO   60 mg at 02/15/23 0946    morphine (PF) injection 2 mg  2 mg IntraVENous Q4H PRN Edward Brittany, DO   2 mg at 02/15/23 0056    acetaminophen (TYLENOL) tablet 500 mg  500 mg Oral Q4H PRN Edward Brittany, DO   500 mg at 02/15/23 2150    HYDROcodone-acetaminophen (NORCO) 5-325 MG per tablet 1 tablet  1 tablet Oral Q6H PRN Enrikeward Brittany, DO   1 tablet at 02/15/23 1801    levothyroxine (SYNTHROID) tablet 25 mcg  25 mcg Oral Daily Edward Brittany, DO   25 mcg at 02/16/23 0556    LORazepam (ATIVAN) tablet 0.5 mg  0.5 mg Oral Q6H PRN Edward Brittany, DO   0.5 mg at 02/14/23 2144    sennosides (SENOKOT) 8.8 MG/5ML syrup 10 mL  10 mL Oral Daily Edward Brittany, DO   10 mL at 02/13/23 1119    insulin lispro (HUMALOG) injection vial 0-8 Units  0-8 Units SubCUTAneous TID WC Eddavid Brittany, DO   2 Units at 02/15/23 1802    insulin lispro (HUMALOG) injection vial 0-4 Units  0-4 Units SubCUTAneous Nightly Edward Brittany, DO        glucose chewable tablet 16 g  4 tablet Oral PRN Edward Brittany, DO        dextrose bolus 10% 125 mL  125 mL IntraVENous PRN Edward Brittany, DO        Or    dextrose bolus 10% 250 mL  250 mL IntraVENous PRN Edward Brittany, DO        glucagon injection 1 mg  1 mg SubCUTAneous PRN Edward Brittany, DO        dextrose 10 % infusion   IntraVENous Continuous PRN Edward Brittany, DO        dextrose 5 % and 0.45 % sodium chloride infusion   IntraVENous Continuous Evelena Narrow, DO 75 mL/hr at 02/15/23 2200 New Bag at 02/15/23 2200    trimethobenzamide (TIGAN) injection 100 mg  100 mg IntraMUSCular Q8H PRN Evelena Narrow, DO        polyethylene glycol [facility]) packet 17 g  17 g Oral Daily Evelena Narrow, DO   17 g at 02/15/23 4033    amiodarone (CORDARONE) tablet 200 mg  200 mg Oral Daily Evelena Narrow, DO   200 mg at 23 0131       Allergies: Allergies   Allergen Reactions    Sulfa Antibiotics Other (See Comments)     unknown       Problem List:    Patient Active Problem List   Diagnosis Code    Seizure-like activity ([facility] R56.9    Closed fracture of right hip ([facility] S72.001A    Chronic deep vein thrombosis (DVT) of left lower extremity (HCC) I82.502    Atrial fibrillation ([address] I48.91       Past Medical History:        Diagnosis Date    Diabetes mellitus ([facility]     DVT (deep venous thrombosis) ([facility] 2011    left leg    Fall     recent, falls x3    Hypertension        Past Surgical History:        Procedure Laterality Date     SECTION      x4    COLONOSCOPY      HIP SURGERY      right    KNEE SURGERY      left       Social History:    Social History     Tobacco Use    Smoking status: Never    Smokeless tobacco: Never   Substance Use Topics    Alcohol use:  Yes     Alcohol/week: 1.0 standard drink     Types: 1 Glasses of wine per week     Comment: rare                                Counseling given: Not Answered      Vital Signs (Current):   Vitals:    23 1246 23 1254 23 1300 23 1307   BP: (!) 161/73      Pulse: 70      Resp: 18 18 17    Temp:   98 °F (36.7 °C)    TempSrc:       SpO2:    100%   Weight:       Height:                                                  BP Readings from Last 3 Encounters:   23 (!) 161/73   23 105/68   22 (!) 149/90       NPO Status:                                                                                 BMI:   Wt Readings from Last 3 Encounters:   02/14/23 133 lb (60.3 kg)   02/11/23 133 lb (60.3 kg)   07/16/22 133 lb (60.3 kg)     Body mass index is 22.13 kg/m². CBC:   Lab Results   Component Value Date/Time    WBC 6.2 02/14/2023 05:55 AM    RBC 2.74 02/14/2023 05:55 AM    HGB 8.5 02/14/2023 05:55 AM    HCT 25.5 02/14/2023 05:55 AM    MCV 93.1 02/14/2023 05:55 AM    RDW 13.1 02/14/2023 05:55 AM     02/14/2023 05:55 AM       CMP:   Lab Results   Component Value Date/Time     02/14/2023 05:55 AM    K 3.2 02/14/2023 05:55 AM    K 3.3 07/16/2022 01:18 PM     02/14/2023 05:55 AM    CO2 22 02/14/2023 05:55 AM    BUN 10 02/14/2023 05:55 AM    CREATININE 0.6 02/14/2023 05:55 AM    GFRAA >60 07/28/2022 03:30 PM    LABGLOM >60 02/14/2023 05:55 AM    GLUCOSE 221 02/14/2023 05:55 AM    GLUCOSE 287 12/05/2011 10:25 PM    PROT 5.5 02/14/2023 05:55 AM    CALCIUM 8.1 02/14/2023 05:55 AM    BILITOT 1.5 02/14/2023 05:55 AM    ALKPHOS 70 02/14/2023 05:55 AM    AST 19 02/14/2023 05:55 AM    ALT 11 02/14/2023 05:55 AM       POC Tests: No results for input(s): POCGLU, POCNA, POCK, POCCL, POCBUN, POCHEMO, POCHCT in the last 72 hours.     Coags:   Lab Results   Component Value Date/Time    PROTIME 18.5 02/11/2023 08:36 PM    PROTIME 15.6 12/07/2011 04:37 AM    INR 1.7 02/11/2023 08:36 PM    APTT 32.0 02/11/2023 08:36 PM       HCG (If Applicable): No results found for: PREGTESTUR, PREGSERUM, HCG, HCGQUANT     ABGs: No results found for: PHART, PO2ART, LRZ3BAH, YSK2TTK, BEART, R0WSNUVF     Type & Screen (If Applicable):  No results found for: LABABO, LABRH    Drug/Infectious Status (If Applicable):  No results found for: HIV, HEPCAB    COVID-19 Screening (If Applicable):   Lab Results   Component Value Date/Time    COVID19 Not Detected 02/11/2023 08:36 AM           Anesthesia Evaluation    Airway: Mallampati: II          Dental:          Pulmonary:                              Cardiovascular:                      Neuro/Psych: GI/Hepatic/Renal:             Endo/Other:                     Abdominal:             Vascular:           Other Findings:           Anesthesia Plan        Andrés Rutherford MD   2/16/2023

## 2023-02-16 NOTE — CARE COORDINATION
2/16/2023 social work transition of care planning  Sw followed up with pt's family at bedside to discuss transition of care planning. Pt had sx today. Family wishes for  to return to [facility],if able to tolerate sitting in w/c. Snf options, if needed:[facility]-no beds,[facility]-will visit facility. Family willing to pay privately. Sw will follow.   Electronically signed by CRISTAL Bates on 2/16/2023 at 3:01 PM

## 2023-02-16 NOTE — PROGRESS NOTES
OCCUPATIONAL THERAPY TREATMENT NOTE     N Located within [facility]      OT BEDSIDE TREATMENT NOTE      Date:2023  Patient Name: Sophy Aguilar  MRN: [id number]  : 5/10/1931  Room: [address] [place] [address]-A     OT orders received & appreciated, chart reviewed. Pt. OR this day, will follow post-op in accordance with Ortho recommendations/orders/POC. Thank you,  [facility].  MADISON OlveraR/L   License #  [id number]

## 2023-02-17 LAB
ALBUMIN SERPL-MCNC: 3.1 G/DL (ref 3.5–5.2)
ALP BLD-CCNC: 83 U/L (ref 35–104)
ALT SERPL-CCNC: 19 U/L (ref 0–32)
ANION GAP SERPL CALCULATED.3IONS-SCNC: 8 MMOL/L (ref 7–16)
AST SERPL-CCNC: 28 U/L (ref 0–31)
BASOPHILS ABSOLUTE: 0.01 E9/L (ref 0–0.2)
BASOPHILS RELATIVE PERCENT: 0.1 % (ref 0–2)
BILIRUB SERPL-MCNC: 0.9 MG/DL (ref 0–1.2)
BUN BLDV-MCNC: 11 MG/DL (ref 6–23)
CALCIUM SERPL-MCNC: 8 MG/DL (ref 8.6–10.2)
CHLORIDE BLD-SCNC: 104 MMOL/L (ref 98–107)
CO2: 26 MMOL/L (ref 22–29)
CREAT SERPL-MCNC: 0.7 MG/DL (ref 0.5–1)
EOSINOPHILS ABSOLUTE: 0.03 E9/L (ref 0.05–0.5)
EOSINOPHILS RELATIVE PERCENT: 0.4 % (ref 0–6)
GFR SERPL CREATININE-BSD FRML MDRD: >60 ML/MIN/1.73
GLUCOSE BLD-MCNC: 258 MG/DL (ref 74–99)
HCT VFR BLD CALC: 20.3 % (ref 34–48)
HEMOGLOBIN: 6.7 G/DL (ref 11.5–15.5)
IMMATURE GRANULOCYTES #: 0.04 E9/L
IMMATURE GRANULOCYTES %: 0.6 % (ref 0–5)
LYMPHOCYTES ABSOLUTE: 1.29 E9/L (ref 1.5–4)
LYMPHOCYTES RELATIVE PERCENT: 18.9 % (ref 20–42)
MCH RBC QN AUTO: 30 PG (ref 26–35)
MCHC RBC AUTO-ENTMCNC: 33 % (ref 32–34.5)
MCV RBC AUTO: 91 FL (ref 80–99.9)
METER GLUCOSE: 216 MG/DL (ref 74–99)
METER GLUCOSE: 248 MG/DL (ref 74–99)
METER GLUCOSE: 251 MG/DL (ref 74–99)
MONOCYTES ABSOLUTE: 0.65 E9/L (ref 0.1–0.95)
MONOCYTES RELATIVE PERCENT: 9.5 % (ref 2–12)
NEUTROPHILS ABSOLUTE: 4.82 E9/L (ref 1.8–7.3)
NEUTROPHILS RELATIVE PERCENT: 70.5 % (ref 43–80)
PDW BLD-RTO: 14.1 FL (ref 11.5–15)
PLATELET # BLD: 274 E9/L (ref 130–450)
PMV BLD AUTO: 9.3 FL (ref 7–12)
POTASSIUM SERPL-SCNC: 3.3 MMOL/L (ref 3.5–5)
RBC # BLD: 2.23 E12/L (ref 3.5–5.5)
SODIUM BLD-SCNC: 138 MMOL/L (ref 132–146)
TOTAL PROTEIN: 5.2 G/DL (ref 6.4–8.3)
WBC # BLD: 6.8 E9/L (ref 4.5–11.5)

## 2023-02-17 PROCEDURE — 80053 COMPREHEN METABOLIC PANEL: CPT

## 2023-02-17 PROCEDURE — 1200000000 HC SEMI PRIVATE

## 2023-02-17 PROCEDURE — 97161 PT EVAL LOW COMPLEX 20 MIN: CPT

## 2023-02-17 PROCEDURE — 6360000002 HC RX W HCPCS

## 2023-02-17 PROCEDURE — 2580000003 HC RX 258

## 2023-02-17 PROCEDURE — 6360000002 HC RX W HCPCS: Performed by: STUDENT IN AN ORGANIZED HEALTH CARE EDUCATION/TRAINING PROGRAM

## 2023-02-17 PROCEDURE — 2580000003 HC RX 258: Performed by: STUDENT IN AN ORGANIZED HEALTH CARE EDUCATION/TRAINING PROGRAM

## 2023-02-17 PROCEDURE — 97535 SELF CARE MNGMENT TRAINING: CPT

## 2023-02-17 PROCEDURE — 6370000000 HC RX 637 (ALT 250 FOR IP): Performed by: STUDENT IN AN ORGANIZED HEALTH CARE EDUCATION/TRAINING PROGRAM

## 2023-02-17 PROCEDURE — 36415 COLL VENOUS BLD VENIPUNCTURE: CPT

## 2023-02-17 PROCEDURE — 97530 THERAPEUTIC ACTIVITIES: CPT

## 2023-02-17 PROCEDURE — 97165 OT EVAL LOW COMPLEX 30 MIN: CPT

## 2023-02-17 PROCEDURE — 85025 COMPLETE CBC W/AUTO DIFF WBC: CPT

## 2023-02-17 PROCEDURE — 82962 GLUCOSE BLOOD TEST: CPT

## 2023-02-17 RX ADMIN — AMIODARONE HYDROCHLORIDE 200 MG: 200 TABLET ORAL at 08:54

## 2023-02-17 RX ADMIN — INSULIN LISPRO 2 UNITS: 100 INJECTION, SOLUTION INTRAVENOUS; SUBCUTANEOUS at 12:51

## 2023-02-17 RX ADMIN — DEXTROSE AND SODIUM CHLORIDE: 5; 450 INJECTION, SOLUTION INTRAVENOUS at 03:37

## 2023-02-17 RX ADMIN — SENNOSIDES 10 ML: 8.8 SYRUP ORAL at 08:54

## 2023-02-17 RX ADMIN — POLYETHYLENE GLYCOL 3350 17 G: 17 POWDER, FOR SOLUTION ORAL at 08:54

## 2023-02-17 RX ADMIN — CEFAZOLIN 2000 MG: 2 INJECTION, POWDER, FOR SOLUTION INTRAMUSCULAR; INTRAVENOUS at 03:37

## 2023-02-17 RX ADMIN — LEVOTHYROXINE SODIUM 25 MCG: 0.05 TABLET ORAL at 06:10

## 2023-02-17 RX ADMIN — INSULIN LISPRO 4 UNITS: 100 INJECTION, SOLUTION INTRAVENOUS; SUBCUTANEOUS at 08:54

## 2023-02-17 RX ADMIN — INSULIN LISPRO 2 UNITS: 100 INJECTION, SOLUTION INTRAVENOUS; SUBCUTANEOUS at 17:25

## 2023-02-17 RX ADMIN — ENOXAPARIN SODIUM 60 MG: 100 INJECTION SUBCUTANEOUS at 21:18

## 2023-02-17 RX ADMIN — ENOXAPARIN SODIUM 60 MG: 100 INJECTION SUBCUTANEOUS at 08:54

## 2023-02-17 NOTE — PROGRESS NOTES
Department of Orthopedic Surgery   Progress Note    Patient seen and examined. More responsive today. Per nursing only complains of pain with rolling movement. No acute overnight event. No new complaints. Denies chest pain, shortness of breath, calf pain, dizziness/lightheadedness, fevers or chills. VITALS:  /70   Pulse 76   Temp 98.1 °F (36.7 °C) (Temporal)   Resp 18   Ht 5' 5\" (1.651 m)   Wt 133 lb (60.3 kg)   SpO2 94%   BMI 22.13 kg/m²     GENERAL: In bed, awake, appears comfortable  MUSCULOSKELETAL:    right lower extremity   Aquacel dressing C/D/I  Compartments soft and compressible, calf non-tender  Palpable dorsalis pedis and posterior tibialis pulse, brisk cap refill to toes, foot warm and perfused  Demonstrates passive ankle plantar/dorsiflexion/great toe extension    CBC:   Lab Results   Component Value Date/Time    WBC 6.2 02/14/2023 05:55 AM    HGB 8.5 02/14/2023 05:55 AM    HCT 25.5 02/14/2023 05:55 AM     02/14/2023 05:55 AM       ASSESSMENT  S/p right ANDRES periprosthetic fracture 2/16    PLAN    Continue physical therapy and protocol: Strict nonweightbearing right lower extremity,  May mobilize to chair  24 hour abx coverage to be completed today  Deep venous thrombosis prophylaxis -per medicine recommendations. Okay to resume postop day 1, early mobilization-okay for Eliquis seen and history of left lower extremity DVT  PT/OT  Pain Control: IV and PO  Monitor H&H  D/C Plan:  pending sw/cm and therapy recommendations.      Electronically signed by Tiffanie Carvalho DO on 2/17/2023 at 6:44 AM

## 2023-02-17 NOTE — DISCHARGE INSTR - COC
Continuity of Care Form    Patient Name: Sophy Pump   :  5/10/1931  MRN:  [id number]    [facility] date:  2023  Discharge date:  23      Code Status Order: DNR-CC   Advance Directives:     Admitting Physician:  Ran Lawler MD  PCP: Breanne Wilson DO    Discharging Nurse: [facility] Unit/Room#: 4960/1302-T  Discharging Unit Phone Number: ***    Emergency Contact:   Extended Emergency Contact Information  Primary Emergency Contact: Song Lilly  Home Phone: [phone]  Mobile Phone: [phone]  Relation: Child   needed?  No  Secondary Emergency Contact: Kunal Lilly  Address: [address] [place] [address] [address] [facility], [address] [place] [address] Phone: [phone]  Relation: Spouse    Past Surgical History:  Past Surgical History:   Procedure Laterality Date     SECTION      x4    COLONOSCOPY      HIP FRACTURE SURGERY Right 2023    RIGHT HIP OPEN REDUCTION INTERNAL FIXATION - RIGHT PERIPROSTHETIC HIP FRACTURE performed by Oswald Solis MD at [address] [facility]      right    KNEE SURGERY      left       Immunization History:   Immunization History   Administered Date(s) Administered    COVID-19, PFIZER PURPLE top, DILUTE for use, (age 15 y+), 30mcg/0.3mL 2021, 2021, 2021    Influenza Vaccine, unspecified formulation 10/30/2017    Pneumococcal Conjugate Vaccine 10/01/2013       Active Problems:  Patient Active Problem List   Diagnosis Code    Seizure-like activity ([facility] R56.9    Closed fracture of right hip ([address] S72.001A    Chronic deep vein thrombosis (DVT) of left lower extremity ([address] I82.502    Atrial fibrillation ([address] I48.91       Isolation/Infection:   Isolation            No Isolation          Patient Infection Status       Infection Onset Added Last Indicated Last Indicated By Review Planned Expiration Resolved Resolved By    None active    Resolved    COVID-19 (Rule Out) 23 COVID-19, Rapid (Ordered)   23 Rule-Out Test Resulted    COVID-19 22 Marysamuel Dixonaro   22     COVID-19 22 COVID-19, Rapid   22 Mary Ever    COVID-19 (Rule Out) 22 COVID-19, Rapid (Ordered)   22 Rule-Out Test Resulted            Nurse Assessment:  Last Vital Signs: /75   Pulse 77   Temp 97.6 °F (36.4 °C) (Temporal)   Resp 18   Ht 5' 5\" (1.651 m)   Wt 133 lb (60.3 kg)   SpO2 95%   BMI 22.13 kg/m²     Last documented pain score (0-10 scale): Pain Level: 3  Last Weight:   Wt Readings from Last 1 Encounters:   23 133 lb (60.3 kg)     Mental Status:   alert to self    IV Access:  - None    Nursing Mobility/ADLs:  Walking   Dependent  Transfer  Dependent  Bathing  Dependent  Dressing  Dependent  Toileting  Dependent  Feeding  Assisted  Med Admin  Assisted  Med Delivery   crushed and prefers mixed with apple sauce    Wound Care Documentation and Therapy:  Incision 23 Hip Right (Active)   Dressing Status Clean;Dry; Intact 23 09   Dressing/Treatment Dry dressing 23 09   Closure Sutures; Staples 23 1230   Drainage Amount None 23 0900   Number of days: 1        Elimination:  Continence: Bowel: No  Bladder: No  Urinary Catheter: Insertion Date: 23    Colostomy/Ileostomy/Ileal Conduit: No       Date of Last BM: ***  No intake or output data in the 24 hours ending 23 1530  I/O last 3 completed shifts:   In: 200 [I.V.:200]  Out: 450 [Urine:400; Blood:50]    Safety Concerns:     History of Falls (last 30 days) and At Risk for Falls    Impairments/Disabilities:      [facility] Impairments/Disabilities:[id number]}    Nutrition Therapy:  Current Nutrition Therapy:   - Oral Diet:  Carb Control 5 carbs/meal (2000kcals/day)    Routes of Feeding: Oral  Liquids: No Restrictions  Daily Fluid Restriction: no  Last Modified Barium Swallow with Video (Video Swallowing Test): not done    Treatments at the Time of Hospital Discharge:   Respiratory Treatments: ***  Oxygen Therapy:  is not on home oxygen therapy. Ventilator:    - No ventilator support    Rehab Therapies: Physical Therapy and Occupational Therapy  Weight Bearing Status/Restrictions: Non-weight bearing on right leg  Other Medical Equipment (for information only, NOT a DME order):  {EQUIPMENT:693890259}  Other Treatments: ***    Patient's personal belongings (please select all that are sent with patient):  {P DME Belongings:359595342}    RN SIGNATURE:  {Esignature:938822868}    CASE MANAGEMENT/SOCIAL WORK SECTION    Inpatient Status Date: ***    Readmission Risk Assessment Score:  Readmission Risk              Risk of Unplanned Readmission:  12           Discharging to Facility/ Agency   Name: Tania Ware  Address:  Phone:  Fax:    Dialysis Facility (if applicable)   Name:  Address:  Dialysis Schedule:  Phone:  Fax:    / signature: Electronically signed by CRISTAL Jean-Baptiste on 2/17/23 at 3:30 PM EST    PHYSICIAN SECTION    Prognosis: {Prognosis:6025171418}    Condition at Discharge: [facility] Patient Condition:[id number]}    Rehab Potential (if transferring to Rehab): {Prognosis:9705850671}    Recommended Labs or Other Treatments After Discharge: ***    Physician Certification: I certify the above information and transfer of Dedrick Preston  is necessary for the continuing treatment of the diagnosis listed and that she requires Fernando Falcon for less 30 days.      Update Admission H&P: {P DME Changes in UVSWE:920606819}    PHYSICIAN SIGNATURE:  Electronically signed by Nader Grover MD on 2/18/23 at 11:19 AM EST

## 2023-02-17 NOTE — CARE COORDINATION
2/17/2023social work transition of care planning  Sw followed up with pt's family to discuss transition of care planning. Family is going to tour two facilities today and let this sw know which one they choose. Sw will follow. Ambulance form completed in letters. Electronically signed by CRISTAL Haider on 2/17/2023 at 12:31 PM      Addendum: Received call form pt's son-Song. Family to tour [address], and [place]. Each facility following.   Electronically signed by CRISTAL Haider on 2/17/2023 at 1:30 PM

## 2023-02-17 NOTE — PROGRESS NOTES
[facility]  [facility]  Progress Notes      CHIEF COMPLAINT: Patient fell at nursing facility      HISTORY OF PRESENT ILLNESS:   2023   Patient was seen on the floor earlier this morning at the main campus of [facility] with the ER physician at the time of admission  Data reviewed in detail with her 3 sons at bedside and a registered nurse  Patient currently living in a nursing facility due to advanced dementia  Presented to ER after sustaining a mechanical fall that resulted in right hip periprosthetic fracture  Admitted for further management  Patient was on Eliquis for atrial fibrillation paroxysmal and recent onset of DVT right lower extremity  Pain control adequate  2023  Patient was seen on the floor earlier this morning  Pain control adequate  Data reviewed in detail  2023  Patient was seen on the floor earlier this morning  Vascular input appreciated  No plans for IVC filter  Pain control adequate  2/15/2023  Patient was seen on the floor earlier this morning  Somnolent from pain meds  Daughter at bedside  2023  Patient was seen on the floor earlier this morning  Somnolent but easily arousable  Pain control adequate  Mental status at her baseline  2023  Patient was seen on the floor earlier this morning  Postop day 1  Pain control adequate  Mental status at her baseline of confusion  Past Medical History:    Past Medical History:   Diagnosis Date    Diabetes mellitus ([address]     DVT (deep venous thrombosis) ([address] 2011    left leg    Fall     recent, falls x3    Hypertension        Past Surgical History:    Past Surgical History:   Procedure Laterality Date     SECTION      x4    COLONOSCOPY      HIP FRACTURE SURGERY Right 2023    RIGHT HIP OPEN REDUCTION INTERNAL FIXATION - RIGHT PERIPROSTHETIC HIP FRACTURE performed by Isaias Seay MD at [facility]      right    KNEE SURGERY      left       Medications Prior to Admission:    Medications Prior to Admission: polyethylene glycol (GLYCOLAX) 17 GM/SCOOP powder, Take 17 g by mouth daily  levothyroxine (SYNTHROID) 25 MCG tablet, Take 25 mcg by mouth Daily  senna (SENOKOT) 8.8 MG/5ML SYRP syrup, Take 10 mLs by mouth daily  LORazepam (ATIVAN) 0.5 MG tablet, Take 0.5 mg by mouth every 6 hours as needed for Anxiety. [DISCONTINUED] insulin glargine (LANTUS) 100 UNIT/ML injection vial, Inject 18 Units into the skin nightly  amiodarone (PACERONE) 100 MG tablet, Take 1 tablet by mouth in the morning. (Patient not taking: Reported on 2/11/2023)  losartan (COZAAR) 100 MG tablet, Take 1 tablet by mouth in the morning. (Patient not taking: Reported on 2/11/2023)  docusate sodium (COLACE) 150 MG/15ML liquid, Take 10 mLs by mouth in the morning. (Patient not taking: Reported on 2/11/2023)  levETIRAcetam (KEPPRA) 500 MG tablet, Take 1 tablet by mouth in the morning and 1 tablet before bedtime. (Patient not taking: Reported on 2/11/2023)  [DISCONTINUED] apixaban (ELIQUIS) 2.5 MG TABS tablet, Take 1 tablet by mouth in the morning and 1 tablet before bedtime. metFORMIN (GLUCOPHAGE) 500 MG tablet, Take 500 mg by mouth daily (with breakfast) (Patient not taking: Reported on 2/11/2023)    Allergies:    Sulfa antibiotics    Social History:    reports that she has never smoked. She has never used smokeless tobacco. She reports current alcohol use of about 1.0 standard drink per week. She reports that she does not use drugs. Family History:   family history is not on file. REVIEW OF SYSTEMS:  As above in the HPI, otherwise negative    PHYSICAL EXAM:    Vitals:  /75   Pulse 77   Temp 97.6 °F (36.4 °C) (Temporal)   Resp 18   Ht 5' 5\" (1.651 m)   Wt 133 lb (60.3 kg)   SpO2 95%   BMI 22.13 kg/m²     General:  Awake, alert, disoriented  Chronically ill looking  Thin built  HEENT:  Normocephalic, atraumatic. Pupils equal, round, reactive to light. No scleral icterus.   No conjunctival injection No nasal discharge. Neck:  Supple  Heart: Irregular consistent with A-fib  Lungs:  CTA bilaterally, bilat symmetrical expansion, no wheeze, rales, or rhonchi  Abdomen:   Bowel sounds present, soft, nontender, no masses, no organomegaly, no peritoneal signs  Extremities:  No clubbing, cyanosis, or edema  Right lower extremity externally rotated with hip tenderness  Skin:  Warm and dry, no open lesions or rash  Neuro:  Cranial nerves 2-12 intact, no focal deficits  Breast: deferred  Rectal: deferred  Genitalia:  deferred    LABS:  Data reviewed      ASSESSMENT:      Principal Problem:    Closed fracture of right hip (HCC)  Periprosthetic  Postop day 1  Mechanical fall  DVT right lower extremity 2 weeks ago  Paroxysmal atrial fibrillation currently in sinus rhythm  Advanced dementia        PLAN:  Pain control  Currently on therapeutic Lovenox dose  May switch to Eliquis soon  Check CBC and CMP  Advance diet and activity    Bradford Moe MD  2:15 PM  2/17/2023

## 2023-02-17 NOTE — PROGRESS NOTES
[address]  [address] [place] [address]      Date:2023                 Patient Name: Evelin Garcia  MRN: [id number]  : 5/10/1931  Room: [address] [place] [address]    Referring Provider: Mell Jiménez DO  Specific Provider Orders/Date: OT evaluation and treat 23    Evaluating OT: Fatmata Rogers. Teresita OTR/L #[id number]    Diagnosis: Closed fracture of right hip, initial encounter ([facility] [S72.001A]  Closed right [facility] fracture, [facility] encounter ([facility] [S72.001A]  Atrial fibrillation, unspecified type [facility]) [I48.91]      Surgery: s/p   R ORIF periprosthetic     Pertinent Medical History:  has a past medical history of Diabetes mellitus ([facility] DVT (deep venous thrombosis) ([facility] Fall, and Hypertension.      Precautions:  Fall Risk, strict NWB RLE, confusion, safety TSM,     Assessment of current deficits    [x] Functional mobility  [x]ADLs  [] Strength               []Cognition   [x] Functional transfers   [x] IADLs         [x] Safety Awareness   [x]Endurance   [] Fine Coordination              [] Balance      [] Vision/perception   []Sensation    []Gross Motor Coordination  [] ROM  [] Delirium                   [] Motor Control     OT PLAN OF CARE   OT POC based on physician orders, patient diagnosis and results of clinical assessment    Frequency/Duration   2-4 days/wk for 1 week PRN   Specific OT Treatment Interventions to include:   * Instruction/training on adapted ADL techniques and AE recommendations to increase functional independence within precautions       * Training on energy conservation strategies, correct breathing pattern and techniques to improve independence/tolerance for self-care routine  * Functional transfer/mobility training/DME recommendations for increased independence, safety, and fall prevention  * Patient/Family education to increase follow through with safety techniques and functional independence  * Recommendation of environmental modifications for increased safety with functional transfers/mobility and ADLs  * Cognitive retraining/development of therapeutic activities to improve problem solving, judgement, memory, and attention for increased safety/participation in ADL/IADL tasks  * Positioning to improve skin integrity, interaction with environment and functional independence    Recommended Adaptive Equipment: TBD     Home Living: Pt from AL memory care unit.   Family/ Outside assistance available: family lives out of town -  local   Equipment owned: ww, shower seat    Prior Level of Function: assisted  with ADLs , dependent with IADLs; ambulated ww at times  Driving: no  Medication management: assist  Leisure: enjoys playing with a tennis ball currently, enjoys [employer]    Pain Level: none at rest and in sitting , moderate with movement   Cognition: A&O: 1/4; Follows 0 step directions   Memory:  poor   Sequencing:  poor   Problem solving:  poor   Judgement/safety:  poor     Functional Assessment:  AM-PAC Daily Activity Raw Score: 8/24   Initial Eval Status  Date: 2/17/23 Treatment Status  Date: STGs = LTGs  Time frame: 2-4 days   Feeding Mod A able to drink from straw, noted slight coughing with liquids  Min A    Grooming Max A for face and hair care  Mod A    UB Dressing Dependent resistant to activity  Mod A    LB Dressing dependent     Bathing Simulated dependent  Max A    Toileting Dependent catheter  Max A    Bed Mobility  Supine to sit: max A x2 patient was fearful and retrograde pushing noted  Sit to supine:  max A x2 with resistance noted   Supine to sit: max A   Sit to supine: max A   Functional Transfers Sit to stand attempted x2 with poor ability to tolerate forward movement and poor ability to understand and follow through NWB to RLE -unsafe     Functional Mobility N/t     Balance Sitting:     Static:  min A     Dynamic:mod  A   Standing: n/t unsafe                                                      Activity Tolerance Poor+ tolerated sitting EOB for approximately 10 min.  Fair+   Visual/  Perceptual Glasses: has them but jaz not wear them         Safety poor                                  fair     Hand Dominance R    AROM (PROM) Strength Additional Info:    RUE  WFL grossly seen in activity would not cooperate in assessment N/t good  and wfl FMC/dexterity noted during ADL tasks       LUE WFL grossly seen in activity would not cooperate in assessment N/t good  and wfl FMC/dexterity noted during ADL tasks     Hearing: WFL   Sensation:   No c/o numbness or tingling   Tone: increased may be due to fear or pain   Edema: none noted    Comments: Upon arrival patient supine and agreeable to evaluation.  Performed OT evaluation with education on NWB to RLE and safety and benefit of upright sitting and positioning. Patient was seen with son in room. EOB grooming performed. Patient unsafe due to cognitive deficits to perform sit to stand transfer and maintain NWB restriction.  At end of session, patient supine with HOB elevated and  with call light and phone within reach, all lines and tubes intact. Nursing notified. Overall patient demonstrated  decreased independence and safety during completion of ADL/functional transfer/mobility tasks.  Pt would benefit from continued skilled OT to increase safety and independence with completion of ADL/IADL tasks for functional independence and quality of life.    Treatment: OT treatment provided this date includes:   Instruction/training on safety and adapted techniques for completion of ADLs: to increase [place] in self care within precautions  with AE/DME prn  if indicated  Functional transfer/mobility training/DME recommendations for increased  independence, safety, and fall prevention   Instruction/training on energy conservation/work simplification for completion of ADLs: techniques to increase [place]  with self care ADLs & iADLs, work simplification to improve endurance   Proper Positioning/Alignment: for optimal healing, skin integrity to prevent breakdown, decrease edema  Skilled monitoring of vitals: to include BP, spO2 & HR during session  Sitting/standing Balance/Tolerance- to increased balance & activity tolerance during ADLs as well as facilitate proper posture and/or positioning. Therapeutic exercise- Instruction on B  UE ROM exercises to improve strength/function for increased [place] with ADLs & iADLs    Rehab Potential: fair  for established goals     Patient / Family Goal: looking for memory care unit with rehab      Patient and/or family were instructed on functional diagnosis, prognosis/goals and OT plan of care. Demonstrated good understanding by family,poor by patient. Eval Complexity: low    Time In: 9:45  Time Out: 10:30  Total Treatment Time: 30 min. Min Units   OT Eval Low 85630  x     OT Eval Medium 80787      OT Eval High 45590       OT Re-Eval [id number]       Therapeutic Ex 02649       Therapeutic Activities 73614  20  1   ADL/Self Care 31865  10  1   Orthotic Management 39989       Neuro Re-Ed 09560       Non-Billable Time          Evaluation Time includes thorough review of current medical information, gathering information on past medical history/social history and prior level of function, completion of standardized testing/informal observation of tasks, assessment of data and education on plan of care and goals. Eli Ty.  Ign[address], [address]

## 2023-02-17 NOTE — PROGRESS NOTES
Comprehensive Nutrition Assessment    Type and Reason for Visit:  Initial, RD Nutrition Re-Screen/LOS    Nutrition Recommendations/Plan:   Modify diet as pt w/ hyperglycemia this adm and hx of DM- start 5 cho restriction to aid in glucose management  Start jhon BID and glucerna BID to aid in wound healing and promote oral intake  Will monitor     Malnutrition Assessment:  Malnutrition Status: At risk for malnutrition (Comment) (02/17/23 1420)    Context:  Chronic Illness     Findings of the 6 clinical characteristics of malnutrition:  Energy Intake:  75% or less estimated energy requirements for 1 month or longer  Weight Loss:  Unable to assess (d/t lack of wt hx per EMR)     Body Fat Loss:  Unable to assess     Muscle Mass Loss:  Unable to assess    Fluid Accumulation:  No significant fluid accumulation     Strength:  Not Performed    Nutrition Assessment:    pt adm d/t hip pain/fall w/ closed frx now s/p ORIF/arthrooplasty revision; on [facility]; confusion noted; PMhx of DM, DVT, HTN, COPD, CA,severe dementia; pt w/ sporadic intakes at meals- will start ONS and monitor. Nutrition Related Findings:    alert; oriented to person; abd WNL; no edema; -I/O; hyperglycemia; no recent A1c drawn Wound Type: Surgical Incision       Current Nutrition Intake & Therapies:    Average Meal Intake: 51-75%, 26-50% (sporadic)  Average Supplements Intake: None Ordered  ADULT ORAL NUTRITION SUPPLEMENT; Breakfast, Lunch; Wound Healing Oral Supplement  ADULT ORAL NUTRITION SUPPLEMENT; Lunch, Dinner; Diabetic Oral Supplement  ADULT DIET; Regular; 5 carb choices (75 gm/meal)    Anthropometric Measures:  Height: 5' 5\" (165.1 cm)  Ideal Body Weight (IBW): 125 lbs (57 kg)       Current Body Weight: 133 lb (60.3 kg) (2/14-no method), 106.4 % IBW.     Current BMI (kg/m2): 22.1  Usual Body Weight:  (UTO d/t lack of wt hx per EMR)     Weight Adjustment For: No Adjustment                 BMI Categories: Normal Weight (BMI 18.5-24. 9)    Estimated Daily Nutrient Needs:  Energy Requirements Based On: Formula  Weight Used for Energy Requirements: Current  Energy (kcal/day): 1705-1913  Weight Used for Protein Requirements: Current  Protein (g/day): 1.3-1.5g/kgxCBW=80-90g  Method Used for Fluid Requirements: 1 ml/kcal  Fluid (ml/day): 6347-5668    Nutrition Diagnosis:   Inadequate oral intake related to increase demand for energy/nutrients as evidenced by wounds, intake 51-75%, intake 26-50%    Nutrition Interventions:   Food and/or Nutrient Delivery: Start Oral Nutrition Supplement, Modify Current Diet ([place] BID and glucerna BID ; pt w/ hx of DM and hyperglycemia this adm- start 5 CHO diet to aid in glucose management)  Nutrition Education/Counseling: Education not appropriate  Coordination of Nutrition Care: Continue to monitor while inpatient       Goals:     Goals: PO intake 75% or greater, by next RD assessment       Nutrition Monitoring and Evaluation:   Behavioral-Environmental Outcomes: None Identified  Food/Nutrient Intake Outcomes: Food and Nutrient Intake, Supplement Intake  Physical Signs/Symptoms Outcomes: Biochemical Data, Nutrition Focused Physical Findings, Skin, Chewing or Swallowing, Weight, GI Status, Fluid Status or Edema    Discharge Planning:     Too soon to determine     Darius Vann RD  Contact: [phone]

## 2023-02-17 NOTE — PROGRESS NOTES
Chart reviewed and spoke with  about case. Family deciding on a facility for rehab. Patient has had [facility] in the past if family wishes to resume after rehab. CODE STATUS to remain a DNR CC. There are no further PM needs at this time. PM will now sign off. If new PM needs arise, please re-consult. Thank you.

## 2023-02-17 NOTE — PROGRESS NOTES
Physical Therapy Initial Evaluation    Name: Tommy Mays  : 5/10/1931  MRN: [id number]      Date of Service: 2023    Evaluating PT:  Dariela Kaiser, PT [id number]    Referring provider/PT Order:  PT Eval and Treat   23 1500  PT eval and treat           Michael Kirby DO     Room #:  4653/9026-V  Diagnosis:  Closed fracture of right hip, initial encounter [facility]) [S72.001A]  Closed right hip fracture, initial encounter ([facility] [S72.001A]  Atrial fibrillation, unspecified type ([facility] [I48.91]  PMHx/PSHx:     has a past medical history of Diabetes mellitus ([facility] DVT (deep venous thrombosis) ([facility] Fall, and Hypertension. has a past surgical history that includes  section; knee surgery; hip surgery; Colonoscopy; and Hip fracture surgery (Right, 2023). Procedure/Surgery:  S/p right ANDRES periprosthetic fracture   Precautions:  Falls,  NWB (non-weight bearing) RLE, dementia,   Equipment Needs: Equipment at [facility],    SUBJECTIVE:    Patient admitted from [address] [place] [address]. History of dementia. Was inconsistent with ambulation with fww. Dementia alert to name only. Family extremely supportive. Equipment owned: Guille Begum,      OBJECTIVE:   Initial Evaluation  Date: 23 Treatment Short Term/ Long Term   Goals   AM-PAC 6 Clicks      Was pt agreeable to Eval/treatment? yes     Does pt have pain? Yes with activity. Bed Mobility  Rolling: Max   Supine to sit:   Max x 2   Sit to supine: Max x 2   Scooting: Max x 2     Resistive to moving. Rolling: Min  Supine to sit: Min  Sit to supine: Min  Scooting: Min   Transfers Sit to stand: Attempted to complete multiple. Patient agitated and unsafe to complete. Thrusting backwards. Stand to sit:   Stand pivot: NT  Sit to stand: Min   Stand to sit: Min   Stand pivot: Min    Ambulation    NT unable. Stair negotiation: ascended and descended  NT       Strength/ROM:     RLE grossly 2/5  LLE grossly 4-/5  RLE AROM decreased due to discomfort. LLE AROM WFL    Balance:     Static Sitting: CGA  Dynamic Sitting: Min  Static Standing: NT unable  Dynamic Standing: NT      Patient is Alert & Oriented x person, place, time, and situation and follows directions   Sensation:  Pt denies numbness and tingling to extremities  Edema:  none    Therapeutic Exercises:  Functional activity     Patient education  Pt educated on role of Physical Therapy, risks of immobility, safety and plan of care,  importance of mobility while in hospital , and weight bearing status  and use of call light for safety. Patient response to education:   Pt verbalized understanding Pt demonstrated skill Pt requires further education in this area   no  Family educated. ASSESSMENT:    Conditions Requiring Skilled Therapeutic Intervention:    [x]Decreased strength     [x]Decreased ROM  [x]Decreased functional mobility  [x]Decreased balance   [x]Decreased endurance   [x]Decreased posture  []Decreased sensation  []Decreased coordination   []Decreased vision  [x]Decreased safety awareness   []Increased pain       Comments:    RN cleared patient for participation in therapy session. Patient was seen this date for PT evaluation. Son present for evaluation. Results of the functional assessment are noted above. At end of session, patient in bed with call light and phone within reach,  all lines and tubes intact, nursing notified. This patient can benefit from the continuation of skilled PT possibly in a rehab setting to maximize functional level and return to PLOF. Treatment:  Patient completed and was instructed in the following treatment:      Bed mobility training - pt given verbal and tactile cues to facilitate proper sequencing and safety during rolling and supine>sit as well as provided with physical assistance to complete task    STS and transfer training -   Gait training -   Education in NWB RLE unable to understand.    Skillful positioning in bed to protect skin/joint integrity. Vitals and symptoms were closely monitored throughout session. Pt's/ family goals      Participate in Rehab process    Prognosis is good  for reaching above PT goals. Patient and or family understand(s) diagnosis, prognosis, and plan of care.  yes,     PHYSICAL THERAPY PLAN OF CARE:    PT POC is established based on physician order and patient diagnosis     Diagnosis:  Closed fracture of right hip, initial encounter [facility] [S72.001A]  Closed right hip fracture, initial encounter ([facility] [S72.001A]  Atrial fibrillation, unspecified type ([facility] [I48.91]  Specific instructions for next treatment:  Transfer to bedside chair and complete STS. Current Treatment Recommendations:     [x] Strengthening to improve independence with functional mobility   [x] ROM to improve independence with functional mobility   [x] Balance Training to improve static/dynamic balance and to reduce fall risk  [x] Endurance Training to improve activity tolerance during functional mobility   [x] Transfer Training to improve safety and independence with all functional transfers   [x] Gait Training to improve gait mechanics, endurance and asses need for appropriate assistive device  [x] Stair Training in preparation for safe discharge home and/or into the community when appropriate  [] Positioning to prevent skin breakdown and contractures  [x] Safety and Education Training   [] Patient/Caregiver Education   [] HEP  [] Gait Team to be added to POC  [] Other     PT long term treatment goals are located in above grid    Frequency of treatments: 2-5x/week x 1-2 weeks. Time in  0950  Time out  1030    Total Treatment Time  30 minutes     Evaluation Time includes thorough review of current medical information, gathering information on past medical history/social history and prior level of function, completion of standardized testing/informal observation of tasks, assessment of data and education on plan of care and goals.     CPT codes:  [x] Low Complexity PT evaluation 76446  [] Moderate Complexity PT evaluation 27393  [] High Complexity PT evaluation 16098  [] PT Re-evaluation 72556  [] Gait training 03962 - minutes  [] Manual therapy 67974  minutes  [x] Therapeutic activities 33849 30 minutes  [] Therapeutic exercises 26374 - minutes  [] Neuromuscular reeducation [id number] minutes     Adline Sensor, [address] [facility]

## 2023-02-17 NOTE — PROGRESS NOTES
PROGRESS NOTE       PATIENT PROBLEM LIST:  Patient Active Problem List   Diagnosis Code    Seiz[facility] ([facility] R56.9    Closed fracture of right hip ([facility] S72.001A    Chronic deep vein thrombosis (DVT) of left lower extremity ([facility]) I82.502    Atrial fibrillation ([facility] I48.91       SUBJECTIVE:  Andres Crooks is resting quietly in bed voicing no complaints at this time. She remains intermittently in atrial fibrillation and apixaban is on hold presently. REVIEW OF SYSTEMS:  General ROS: negative for - fatigue, malaise,  weight gain or weight loss  Psychological ROS: negative for - anxiety , depression  Ophthalmic ROS: negative for - decreased vision or visual distortion. ENT ROS: negative  Allergy and Immunology ROS: negative  Hematological and Lymphatic ROS: negative  Endocrine: no heat or cold intolerance and no polyphagia, polydipsia, or polyuria  Respiratory ROS: negative for - hemoptysis, pleuritic pain, and wheezing  Cardiovascular ROS: positive for - irregular heartbeat. Gastrointestinal ROS: no abdominal pain, change in bowel habits, or black or bloody stools  Genito-Urinary ROS: no nocturia, dysuria, trouble voiding, frequency or hematuria  Musculoskeletal ROS: negative for- myalgias, arthralgias, or claudication  Neurological ROS: no TIA or stroke symptoms otherwise no significant change in symptoms or problems since yesterday as documented in previous progress notes.     SCHEDULED MEDICATIONS:   enoxaparin  60 mg SubCUTAneous BID    levothyroxine  25 mcg Oral Daily    sennosides  10 mL Oral Daily    insulin lispro  0-8 Units SubCUTAneous TID WC    insulin lispro  0-4 Units SubCUTAneous Nightly    polyethylene glycol  17 g Oral Daily    amiodarone  200 mg Oral Daily       VITAL SIGNS:                                                                                                                          /75   Pulse 77   Temp 97.6 °F (36.4 °C) (Temporal)   Resp 18   Ht 5' 5\" (1.651 m)   Wt 133 lb (60.3 kg)   SpO2 95%   BMI 22.13 kg/m²   Patient Vitals for the past 96 hrs (Last 3 readings):   Weight   02/14/23 1100 133 lb (60.3 kg)     OBJECTIVE:    HEENT: PERRL, EOM  Intact; sclera non-icteric, conjunctiva pink. Carotids are brisk in upstroke with normal contour. No carotid bruits. Normal jugular venous pulsation at 45°. No palpable cervical nor supraclavicular nodes. Thyroid not palpable. Trachea midline. Chest: Even excursion  Lungs: CTA B, no expiratory wheezes or rhonchi, no decreased tactile fremitus without inspiratory rales. Heart: Regular  rhythm; S1 > S2, no gallop or murmur. No clicks, rub, palpable thrills   or heaves. PMI nondisplaced, 5th intercostal space MCL. Abdomen: Soft, nontender, nondistended,  mildly protuberant, no masses or organomegaly. Bowel sounds active. Extremities: Without clubbing, cyanosis or edema. Pulses present 3+ upper extermities bilaterally; present 1+ DP  bilaterally and present 1+ PT bilaterally. Data:   Scheduled Meds: Reviewed  Continuous Infusions:    dextrose      dextrose 5 % and 0.45 % NaCl 75 mL/hr at 02/17/23 1228       Intake/Output Summary (Last 24 hours) at 2/13/2023 2032  Last data filed at 2/13/2023 1918  Gross per 24 hour   Intake 180 ml   Output 1475 ml   Net -1295 ml     CBC:   Recent Labs     02/12/23  0456 02/13/23  0501   WBC 6.2 6.5   HGB 9.2* 9.0*   HCT 27.6* 27.1*    197     BMP:  Recent Labs     02/12/23  0456 02/13/23  0501    140   K 3.5 3.7   * 106   CO2 25 23   BUN 22 15   CREATININE 0.7 0.9   LABGLOM >60 >60     ABGs:   Lab Results   Component Value Date/Time    PH 7.39 12/05/2011 06:10 PM     INR:   No results for input(s): INR in the last 72 hours.     PRO-BNP:   Lab Results   Component Value Date    PROBNP 130 02/11/2023    PROBNP 118 04/11/2017      TSH:   Lab Results   Component Value Date    TSH 10.940 (H) 02/11/2023      Cardiac Injury Profile: No results for input(s): TROPHS in the last 72 hours. Lipid Profile:   Lab Results   Component Value Date/Time    TRIG 146 07/28/2022 03:30 PM    HDL 41 07/28/2022 03:30 PM    LDLCALC 154 07/28/2022 03:30 PM    CHOL 224 07/28/2022 03:30 PM      Hemoglobin A1C: No components found for: HGBA1C      RAD:   No results found. EKG: See Report  Echo: See Report      IMPRESSIONS:  Patient Active Problem List   Diagnosis Code    Seizure-like activity ([address] R56.9    Closed fracture of right hip ([address] S72.001A    Chronic deep vein thrombosis (DVT) of left lower extremity ([address] I82.502    Atrial fibrillation ([address] I48.91       RECOMMENDATIONS:  Hopefully will be able to proceed with surgery but will bridge utilizing either heparin or enoxaparin as she remains at risk for systemic embolization and only needs to be off apixaban for 48 hours preoperatively. I have spent more than 25 minutes face to face with Sen Quiles and reviewing notes and laboratory data, with greater than 50% of this time instructing and counseling the patient face to face regarding my findings and recommendations and I have answered all questions as posed to me by Ms. Tiffany Barnett. Padma Paris, DO FACP,FACC,FSCAI      NOTE:  This report was transcribed using voice recognition software.   Every effort was made to ensure accuracy; however, inadvertent computerized transcription errors may be present

## 2023-02-17 NOTE — CARE COORDINATION
2/17/2023social work transition of care planning  Sw notified that pt accepted at [facility]-family's primary choice. They can accept over the weekend. Sw will complete pasar,envelope will be in soft chart. No covid  needed. Pt is po#1. Sw will update pt's family.   Electronically signed by CRISTAL Sandoval on 2/17/2023 at 3:34 PM

## 2023-02-18 LAB
BLOOD BANK DISPENSE STATUS: NORMAL
BLOOD BANK PRODUCT CODE: NORMAL
BPU ID: NORMAL
DESCRIPTION BLOOD BANK: NORMAL
HCT VFR BLD CALC: 24.8 % (ref 34–48)
HEMOGLOBIN: 8.3 G/DL (ref 11.5–15.5)
METER GLUCOSE: 210 MG/DL (ref 74–99)
METER GLUCOSE: 212 MG/DL (ref 74–99)
METER GLUCOSE: 226 MG/DL (ref 74–99)
METER GLUCOSE: 258 MG/DL (ref 74–99)

## 2023-02-18 PROCEDURE — 85018 HEMOGLOBIN: CPT

## 2023-02-18 PROCEDURE — 6360000002 HC RX W HCPCS: Performed by: STUDENT IN AN ORGANIZED HEALTH CARE EDUCATION/TRAINING PROGRAM

## 2023-02-18 PROCEDURE — 82962 GLUCOSE BLOOD TEST: CPT

## 2023-02-18 PROCEDURE — 36430 TRANSFUSION BLD/BLD COMPNT: CPT

## 2023-02-18 PROCEDURE — 36415 COLL VENOUS BLD VENIPUNCTURE: CPT

## 2023-02-18 PROCEDURE — 6370000000 HC RX 637 (ALT 250 FOR IP): Performed by: STUDENT IN AN ORGANIZED HEALTH CARE EDUCATION/TRAINING PROGRAM

## 2023-02-18 PROCEDURE — 85014 HEMATOCRIT: CPT

## 2023-02-18 PROCEDURE — 1200000000 HC SEMI PRIVATE

## 2023-02-18 RX ORDER — AMIODARONE HYDROCHLORIDE 200 MG/1
200 TABLET ORAL DAILY
DISCHARGE
Start: 2023-02-19

## 2023-02-18 RX ORDER — SODIUM CHLORIDE 9 MG/ML
INJECTION, SOLUTION INTRAVENOUS PRN
Status: DISCONTINUED | OUTPATIENT
Start: 2023-02-18 | End: 2023-02-19 | Stop reason: HOSPADM

## 2023-02-18 RX ADMIN — ACETAMINOPHEN 500 MG: 500 TABLET ORAL at 13:45

## 2023-02-18 RX ADMIN — INSULIN LISPRO 2 UNITS: 100 INJECTION, SOLUTION INTRAVENOUS; SUBCUTANEOUS at 13:57

## 2023-02-18 RX ADMIN — ENOXAPARIN SODIUM 60 MG: 100 INJECTION SUBCUTANEOUS at 09:48

## 2023-02-18 RX ADMIN — ENOXAPARIN SODIUM 60 MG: 100 INJECTION SUBCUTANEOUS at 20:31

## 2023-02-18 RX ADMIN — ACETAMINOPHEN 500 MG: 500 TABLET ORAL at 09:51

## 2023-02-18 RX ADMIN — POLYETHYLENE GLYCOL 3350 17 G: 17 POWDER, FOR SOLUTION ORAL at 09:52

## 2023-02-18 RX ADMIN — SENNOSIDES 10 ML: 8.8 SYRUP ORAL at 09:50

## 2023-02-18 RX ADMIN — LEVOTHYROXINE SODIUM 25 MCG: 0.05 TABLET ORAL at 09:49

## 2023-02-18 RX ADMIN — INSULIN LISPRO 2 UNITS: 100 INJECTION, SOLUTION INTRAVENOUS; SUBCUTANEOUS at 17:56

## 2023-02-18 RX ADMIN — AMIODARONE HYDROCHLORIDE 200 MG: 200 TABLET ORAL at 09:49

## 2023-02-18 ASSESSMENT — PAIN DESCRIPTION - ONSET
ONSET: ON-GOING
ONSET: ON-GOING

## 2023-02-18 ASSESSMENT — PAIN - FUNCTIONAL ASSESSMENT
PAIN_FUNCTIONAL_ASSESSMENT: PREVENTS OR INTERFERES SOME ACTIVE ACTIVITIES AND ADLS
PAIN_FUNCTIONAL_ASSESSMENT: PREVENTS OR INTERFERES SOME ACTIVE ACTIVITIES AND ADLS

## 2023-02-18 ASSESSMENT — PAIN SCALES - GENERAL
PAINLEVEL_OUTOF10: 6
PAINLEVEL_OUTOF10: 5
PAINLEVEL_OUTOF10: 4

## 2023-02-18 ASSESSMENT — PAIN SCALES - WONG BAKER: WONGBAKER_NUMERICALRESPONSE: 0

## 2023-02-18 ASSESSMENT — PAIN DESCRIPTION - PAIN TYPE
TYPE: ACUTE PAIN;SURGICAL PAIN
TYPE: ACUTE PAIN;SURGICAL PAIN

## 2023-02-18 ASSESSMENT — PAIN DESCRIPTION - DESCRIPTORS
DESCRIPTORS: ACHING;DISCOMFORT
DESCRIPTORS: ACHING;DISCOMFORT

## 2023-02-18 ASSESSMENT — PAIN DESCRIPTION - ORIENTATION
ORIENTATION: RIGHT
ORIENTATION: RIGHT

## 2023-02-18 ASSESSMENT — PAIN DESCRIPTION - LOCATION
LOCATION: HIP
LOCATION: HIP

## 2023-02-18 ASSESSMENT — PAIN DESCRIPTION - FREQUENCY
FREQUENCY: CONTINUOUS
FREQUENCY: CONTINUOUS

## 2023-02-18 NOTE — PROGRESS NOTES
Kandace Barcenas MD [facility]  Internal medicine  Progress Notes      CHIEF COMPLAINT: Patient fell at nursing facility      HISTORY OF PRESENT ILLNESS:   2023   Patient was seen on the floor earlier this morning at the main campus of [facility] with the ER physician at the time of admission  Data reviewed in detail with her 3 sons at bedside and a registered nurse  Patient currently living in a nursing facility due to advanced dementia  Presented to ER after sustaining a mechanical fall that resulted in right hip periprosthetic fracture  Admitted for further management  Patient was on Eliquis for atrial fibrillation paroxysmal and recent onset of DVT right lower extremity  Pain control adequate  2023  Patient was seen on the floor earlier this morning  Pain control adequate  Data reviewed in detail  2023  Patient was seen on the floor earlier this morning  Vascular input appreciated  No plans for IVC filter  Pain control adequate  2/15/2023  Patient was seen on the floor earlier this morning  Somnolent from pain meds  Daughter at bedside  2023  Patient was seen on the floor earlier this morning  Somnolent but easily arousable  Pain control adequate  Mental status at her baseline  2023  Patient was seen on the floor earlier this morning  Postop day 1  Pain control adequate  Mental status at her baseline of confusion  2023  Patient was seen on the floor earlier this morning  Pain control adequate  Transfused yesterday  Postop day 2  Past Medical History:    Past Medical History:   Diagnosis Date    Diabetes mellitus ([address]     DVT (deep venous thrombosis) ([address] 2011    left leg    Fall     recent, falls x3    Hypertension        Past Surgical History:    Past Surgical History:   Procedure Laterality Date     SECTION      x4    COLONOSCOPY      HIP FRACTURE SURGERY Right 2023    RIGHT HIP OPEN REDUCTION INTERNAL FIXATION - RIGHT PERIPROSTHETIC HIP FRACTURE performed by Jordy Bull MD at [address]      right    KNEE SURGERY      left       Medications Prior to Admission:    Medications Prior to Admission: polyethylene glycol (GLYCOLAX) 17 GM/SCOOP powder, Take 17 g by mouth daily  levothyroxine (SYNTHROID) 25 MCG tablet, Take 25 mcg by mouth Daily  senna (SENOKOT) 8.8 MG/5ML SYRP syrup, Take 10 mLs by mouth daily  LORazepam (ATIVAN) 0.5 MG tablet, Take 0.5 mg by mouth every 6 hours as needed for Anxiety. [DISCONTINUED] insulin glargine (LANTUS) 100 UNIT/ML injection vial, Inject 18 Units into the skin nightly  docusate sodium (COLACE) 150 MG/15ML liquid, Take 10 mLs by mouth in the morning. [DISCONTINUED] amiodarone (PACERONE) 100 MG tablet, Take 1 tablet by mouth in the morning. (Patient not taking: Reported on 2/11/2023)  [DISCONTINUED] apixaban (ELIQUIS) 2.5 MG TABS tablet, Take 1 tablet by mouth in the morning and 1 tablet before bedtime. [DISCONTINUED] losartan (COZAAR) 100 MG tablet, Take 1 tablet by mouth in the morning. (Patient not taking: Reported on 2/11/2023)  [DISCONTINUED] levETIRAcetam (KEPPRA) 500 MG tablet, Take 1 tablet by mouth in the morning and 1 tablet before bedtime. (Patient not taking: Reported on 2/11/2023)  [DISCONTINUED] metFORMIN (GLUCOPHAGE) 500 MG tablet, Take 500 mg by mouth daily (with breakfast) (Patient not taking: Reported on 2/11/2023)    Allergies:    Sulfa antibiotics    Social History:    reports that she has never smoked. She has never used smokeless tobacco. She reports current alcohol use of about 1.0 standard drink per week. She reports that she does not use drugs. Family History:   family history is not on file.     REVIEW OF SYSTEMS:  As above in the HPI, otherwise negative    PHYSICAL EXAM:    Vitals:  /80   Pulse 80   Temp 98.5 °F (36.9 °C)   Resp 15   Ht 5' 5\" (1.651 m)   Wt 133 lb (60.3 kg)   SpO2 98%   BMI 22.13 kg/m²     General:  Awake, alert, disoriented  Chronically ill looking  Thin built  HEENT:  Normocephalic, atraumatic. Pupils equal, round, reactive to light. No scleral icterus. No conjunctival injection No nasal discharge. Neck:  Supple  Heart: Irregular consistent with A-fib  Lungs:  CTA bilaterally, bilat symmetrical expansion, no wheeze, rales, or rhonchi  Abdomen:   Bowel sounds present, soft, nontender, no masses, no organomegaly, no peritoneal signs  Extremities:  No clubbing, cyanosis, or edema  Right lower extremity externally rotated with hip tenderness  Skin:  Warm and dry, no open lesions or rash  Neuro:  Cranial nerves 2-12 intact, no focal deficits  Breast: deferred  Rectal: deferred  Genitalia:  deferred    LABS:  Data reviewed      ASSESSMENT:      Principal Problem:    Closed fracture of right hip (HCC)  Periprosthetic  Postop day 2  Acute blood loss anemia transfused yesterday  Mechanical fall  DVT right lower extremity 2 weeks ago  Paroxysmal atrial fibrillation currently in sinus rhythm  Advanced dementia        PLAN:  Medically stable for discharge to [facility]  Advance diet and activity    Aravind De León MD  3:33 PM  2/18/2023

## 2023-02-18 NOTE — PROGRESS NOTES
Department of Orthopedic Surgery   Progress Note    Patient seen and examined. Pain control. Hemoglobin 6.7 last night, 1 unit of blood ordered and completed. No other acute overnight event. No new complaints. VITALS:  /77   Pulse 83   Temp 98.5 °F (36.9 °C) (Temporal)   Resp 20   Ht 5' 5\" (1.651 m)   Wt 133 lb (60.3 kg)   SpO2 98%   BMI 22.13 kg/m²     GENERAL: In bed, awake, appears comfortable  MUSCULOSKELETAL:    right lower extremity   Aquacel dressing C/D/I  Compartments soft and compressible, calf non-tender  Palpable dorsalis pedis and posterior tibialis pulse, brisk cap refill to toes, foot warm and perfused  Demonstrates passive ankle plantar/dorsiflexion/great toe extension    CBC:   Lab Results   Component Value Date/Time    WBC 6.8 02/17/2023 10:33 PM    HGB 6.7 02/17/2023 10:33 PM    HCT 20.3 02/17/2023 10:33 PM     02/17/2023 10:33 PM       ASSESSMENT  S/p right ANDRES periprosthetic fracture 2/16    PLAN    Continue physical therapy and protocol: Strict nonweightbearing right lower extremity, fall precaution  May mobilize to chair  24 hour abx coverage completed  Deep venous thrombosis prophylaxis -per medicine recommendations. Okay to resume postop day 1, early mobilization-okay for Eliquis seeing  history of left lower extremity DVT  PT/OT  Pain Control: IV and PO  Monitor H&H  D/C Plan:  pending sw/cm and therapy recommendations.      Electronically signed by Jad Frankel DO on 2/18/2023 at 5:42 AM

## 2023-02-19 VITALS
HEIGHT: 65 IN | SYSTOLIC BLOOD PRESSURE: 93 MMHG | RESPIRATION RATE: 20 BRPM | WEIGHT: 133 LBS | BODY MASS INDEX: 22.16 KG/M2 | OXYGEN SATURATION: 92 % | DIASTOLIC BLOOD PRESSURE: 55 MMHG | HEART RATE: 69 BPM | TEMPERATURE: 97.7 F

## 2023-02-19 LAB
ALBUMIN SERPL-MCNC: 2.9 G/DL (ref 3.5–5.2)
ALP BLD-CCNC: 85 U/L (ref 35–104)
ALT SERPL-CCNC: 14 U/L (ref 0–32)
ANION GAP SERPL CALCULATED.3IONS-SCNC: 10 MMOL/L (ref 7–16)
AST SERPL-CCNC: 19 U/L (ref 0–31)
BASOPHILS ABSOLUTE: 0.02 E9/L (ref 0–0.2)
BASOPHILS RELATIVE PERCENT: 0.3 % (ref 0–2)
BILIRUB SERPL-MCNC: 1.4 MG/DL (ref 0–1.2)
BUN BLDV-MCNC: 11 MG/DL (ref 6–23)
CALCIUM SERPL-MCNC: 7.9 MG/DL (ref 8.6–10.2)
CHLORIDE BLD-SCNC: 102 MMOL/L (ref 98–107)
CO2: 26 MMOL/L (ref 22–29)
CREAT SERPL-MCNC: 0.6 MG/DL (ref 0.5–1)
EOSINOPHILS ABSOLUTE: 0.13 E9/L (ref 0.05–0.5)
EOSINOPHILS RELATIVE PERCENT: 2.2 % (ref 0–6)
GFR SERPL CREATININE-BSD FRML MDRD: >60 ML/MIN/1.73
GLUCOSE BLD-MCNC: 200 MG/DL (ref 74–99)
HCT VFR BLD CALC: 23.3 % (ref 34–48)
HEMOGLOBIN: 7.9 G/DL (ref 11.5–15.5)
IMMATURE GRANULOCYTES #: 0.03 E9/L
IMMATURE GRANULOCYTES %: 0.5 % (ref 0–5)
LYMPHOCYTES ABSOLUTE: 1.21 E9/L (ref 1.5–4)
LYMPHOCYTES RELATIVE PERCENT: 20.5 % (ref 20–42)
MCH RBC QN AUTO: 29.5 PG (ref 26–35)
MCHC RBC AUTO-ENTMCNC: 33.9 % (ref 32–34.5)
MCV RBC AUTO: 86.9 FL (ref 80–99.9)
METER GLUCOSE: 208 MG/DL (ref 74–99)
METER GLUCOSE: 220 MG/DL (ref 74–99)
METER GLUCOSE: 314 MG/DL (ref 74–99)
MONOCYTES ABSOLUTE: 0.52 E9/L (ref 0.1–0.95)
MONOCYTES RELATIVE PERCENT: 8.8 % (ref 2–12)
NEUTROPHILS ABSOLUTE: 4 E9/L (ref 1.8–7.3)
NEUTROPHILS RELATIVE PERCENT: 67.7 % (ref 43–80)
PDW BLD-RTO: 14.6 FL (ref 11.5–15)
PLATELET # BLD: 320 E9/L (ref 130–450)
PMV BLD AUTO: 8.9 FL (ref 7–12)
POTASSIUM SERPL-SCNC: 3.2 MMOL/L (ref 3.5–5)
RBC # BLD: 2.68 E12/L (ref 3.5–5.5)
SODIUM BLD-SCNC: 138 MMOL/L (ref 132–146)
TOTAL PROTEIN: 5 G/DL (ref 6.4–8.3)
WBC # BLD: 5.9 E9/L (ref 4.5–11.5)

## 2023-02-19 PROCEDURE — 85025 COMPLETE CBC W/AUTO DIFF WBC: CPT

## 2023-02-19 PROCEDURE — 80053 COMPREHEN METABOLIC PANEL: CPT

## 2023-02-19 PROCEDURE — 6370000000 HC RX 637 (ALT 250 FOR IP): Performed by: INTERNAL MEDICINE

## 2023-02-19 PROCEDURE — 6370000000 HC RX 637 (ALT 250 FOR IP): Performed by: STUDENT IN AN ORGANIZED HEALTH CARE EDUCATION/TRAINING PROGRAM

## 2023-02-19 PROCEDURE — 36415 COLL VENOUS BLD VENIPUNCTURE: CPT

## 2023-02-19 PROCEDURE — 2580000003 HC RX 258: Performed by: STUDENT IN AN ORGANIZED HEALTH CARE EDUCATION/TRAINING PROGRAM

## 2023-02-19 PROCEDURE — 6360000002 HC RX W HCPCS: Performed by: INTERNAL MEDICINE

## 2023-02-19 PROCEDURE — 82962 GLUCOSE BLOOD TEST: CPT

## 2023-02-19 PROCEDURE — 2580000003 HC RX 258: Performed by: INTERNAL MEDICINE

## 2023-02-19 RX ORDER — LORAZEPAM 0.5 MG/1
0.5 TABLET ORAL EVERY 6 HOURS PRN
Qty: 20 TABLET | Refills: 0 | Status: SHIPPED | OUTPATIENT
Start: 2023-02-19 | End: 2023-02-24

## 2023-02-19 RX ORDER — SODIUM FERRIC GLUCONATE COMPLEX IN SUCROSE 12.5 MG/ML
125 INJECTION INTRAVENOUS ONCE
Status: DISCONTINUED | OUTPATIENT
Start: 2023-02-19 | End: 2023-02-19 | Stop reason: SDUPTHER

## 2023-02-19 RX ADMIN — SODIUM CHLORIDE 125 MG: 9 INJECTION, SOLUTION INTRAVENOUS at 09:41

## 2023-02-19 RX ADMIN — APIXABAN 2.5 MG: 2.5 TABLET, FILM COATED ORAL at 09:31

## 2023-02-19 RX ADMIN — ACETAMINOPHEN 500 MG: 500 TABLET ORAL at 11:14

## 2023-02-19 RX ADMIN — POTASSIUM BICARBONATE 40 MEQ: 782 TABLET, EFFERVESCENT ORAL at 11:10

## 2023-02-19 RX ADMIN — DEXTROSE AND SODIUM CHLORIDE 450 ML: 5; 450 INJECTION, SOLUTION INTRAVENOUS at 00:16

## 2023-02-19 RX ADMIN — ACETAMINOPHEN 500 MG: 500 TABLET ORAL at 05:34

## 2023-02-19 RX ADMIN — LEVOTHYROXINE SODIUM 25 MCG: 0.05 TABLET ORAL at 05:34

## 2023-02-19 ASSESSMENT — PAIN DESCRIPTION - LOCATION: LOCATION: LEG

## 2023-02-19 ASSESSMENT — PAIN - FUNCTIONAL ASSESSMENT: PAIN_FUNCTIONAL_ASSESSMENT: PREVENTS OR INTERFERES SOME ACTIVE ACTIVITIES AND ADLS

## 2023-02-19 ASSESSMENT — PAIN DESCRIPTION - ORIENTATION: ORIENTATION: RIGHT

## 2023-02-19 ASSESSMENT — PAIN SCALES - GENERAL: PAINLEVEL_OUTOF10: 3

## 2023-02-19 ASSESSMENT — PAIN DESCRIPTION - DESCRIPTORS: DESCRIPTORS: ACHING

## 2023-02-19 NOTE — PLAN OF CARE
Supportive fam all around and helping today w many things. Intake improved. Turns w wedges. Unit of PRCs infused w no probl. Courtesy call to fam re HGB getting up above 8 now. Supportive care.

## 2023-02-19 NOTE — PLAN OF CARE
Problem: Chronic Conditions and Co-morbidities  Goal: Patient's chronic conditions and co-morbidity symptoms are monitored and maintained or improved  2/18/2023 2303 by Jagdeep Wilson RN  Outcome: Progressing  2/18/2023 2006 by Justus Puga RN  Outcome: Progressing     Problem: Discharge Planning  Goal: Discharge to home or other facility with appropriate resources  Outcome: Progressing     Problem: Skin/Tissue Integrity  Goal: Absence of new skin breakdown  Description: 1. Monitor for areas of redness and/or skin breakdown  2. Assess vascular access sites hourly  3. Every 4-6 hours minimum:  Change oxygen saturation probe site  4. Every 4-6 hours:  If on nasal continuous positive airway pressure, respiratory therapy assess nares and determine need for appliance change or resting period.   2/18/2023 2303 by Jagdeep Wilson RN  Outcome: Progressing  2/18/2023 2006 by Justus Puga RN  Outcome: Progressing

## 2023-02-19 NOTE — PROGRESS NOTES
Department of Orthopedic Surgery   Progress Note    Patient seen and examined. Pain control. Resting in bed comfortably this morning. No new complaints. VITALS:  BP (!) 93/55   Pulse 69   Temp 97.7 °F (36.5 °C) (Temporal)   Resp 20   Ht 5' 5\" (1.651 m)   Wt 133 lb (60.3 kg)   SpO2 92%   BMI 22.13 kg/m²     GENERAL: In bed, appears comfortable  MUSCULOSKELETAL:    right lower extremity   Aquacel dressing C/D/I  Compartments soft and compressible, calf non-tender  Palpable dorsalis pedis and posterior tibialis pulse, brisk cap refill to toes, foot warm and perfused  Demonstrates passive ankle plantar/dorsiflexion/great toe extension    CBC:   Lab Results   Component Value Date/Time    WBC 5.9 02/19/2023 07:09 AM    HGB 7.9 02/19/2023 07:09 AM    HCT 23.3 02/19/2023 07:09 AM     02/19/2023 07:09 AM       ASSESSMENT  S/p right ANDRES periprosthetic fracture open reduction internal fixation 2/16    PLAN    Continue physical therapy and protocol: Strict nonweightbearing right lower extremity, fall precaution  May mobilize to chair  24 hour abx coverage completed  Deep venous thrombosis prophylaxis -per medicine recommendations. Okay to resume postop day 1, early mobilization-PT/OT  Pain Control: IV and PO  Monitor H&H 7.9  D/C Plan:  pending sw/cm and therapy recommendations.      Electronically signed by Alex Nazario DO on 2/19/2023 at 8:30 AM

## 2023-02-19 NOTE — CARE COORDINATION
Care Coordination  The patient is set up to be picked up at noon to go to [facility] skilled by [facility]. No Need for a covid test. Passr and envelope done. The patients  Son Brisa Seymour was notified of this @ [phone].  Will follow

## 2023-02-23 ENCOUNTER — TELEPHONE (OUTPATIENT)
Dept: ORTHOPEDIC SURGERY | Age: 88
End: 2023-02-23

## 2023-02-23 NOTE — TELEPHONE ENCOUNTER
OK to use tone lift for patient transfers due to underlying dementia and safety.   Electronically signed by Guillaume Steiner PA-C on 2/23/2023 at 1:56 PM

## 2023-02-23 NOTE — TELEPHONE ENCOUNTER
Kayy's message states that due to patient's dementia that PT group is requesting clearance to use a tone lift for transferring the patient. They believe that this would be the safest way to transfer patient. Requesting okay to use tone lift.      Procedure: 2/16/23  Open reduction internal fixation intertrochanteric region around the right total hip arthroplasty with plates and screws and cables      Future Appointments   Date Time Provider Brady Lopez   3/6/2023 10:00 AM SCHEDULE, [facility]     Routed

## 2023-02-24 NOTE — TELEPHONE ENCOUNTER
Called and spoke with Leyda Cantu, advised that per Ajit Delvalle PA-C patient is able to be tone lifted. Leyda Cantu verbalized understanding and appreciation, no further questions at this time.

## 2023-03-02 ENCOUNTER — TELEPHONE (OUTPATIENT)
Dept: ORTHOPEDIC SURGERY | Age: 88
End: 2023-03-02

## 2023-03-02 NOTE — TELEPHONE ENCOUNTER
Xavier from [facility], pt in isolation d/t contracted covid 10 day will be 3/6/23. Can she come to appt   Future Appointments   Date Time Provider Brady Lopez   3/6/2023 10:00 AM SCHEDULE, [facility]     Advised as long as pt does not have any symptoms, okay to be seen in office.

## 2023-03-06 ENCOUNTER — HOSPITAL ENCOUNTER (OUTPATIENT)
Dept: GENERAL RADIOLOGY | Age: 88
Discharge: HOME | End: 2023-03-08

## 2023-03-06 ENCOUNTER — OFFICE VISIT (OUTPATIENT)
Dept: ORTHOPEDIC SURGERY | Age: 88
End: 2023-03-06

## 2023-03-06 VITALS — BODY MASS INDEX: 22.13 KG/M2 | HEIGHT: 65 IN

## 2023-03-06 DIAGNOSIS — S72.001A CLOSED FRACTURE OF RIGHT HIP, INITIAL ENCOUNTER (HCC): Primary | ICD-10-CM

## 2023-03-06 DIAGNOSIS — S72.001D CLOSED FRACTURE OF RIGHT HIP WITH ROUTINE HEALING, SUBSEQUENT ENCOUNTER: Primary | ICD-10-CM

## 2023-03-06 DIAGNOSIS — I82.5Y2 CHRONIC DEEP VEIN THROMBOSIS (DVT) OF PROXIMAL VEIN OF LEFT LOWER EXTREMITY (HCC): ICD-10-CM

## 2023-03-06 DIAGNOSIS — S72.001A CLOSED FRACTURE OF RIGHT HIP, INITIAL ENCOUNTER (HCC): ICD-10-CM

## 2023-03-06 PROCEDURE — 99213 OFFICE O/P EST LOW 20 MIN: CPT

## 2023-03-06 PROCEDURE — 99024 POSTOP FOLLOW-UP VISIT: CPT | Performed by: PHYSICIAN ASSISTANT

## 2023-03-06 PROCEDURE — 73502 X-RAY EXAM HIP UNI 2-3 VIEWS: CPT

## 2023-03-06 RX ORDER — INSULIN GLARGINE 100 [IU]/ML
18 INJECTION, SOLUTION SUBCUTANEOUS EVERY MORNING
COMMUNITY

## 2023-03-06 RX ORDER — DOXYCYCLINE HYCLATE 50 MG/1
324 CAPSULE, GELATIN COATED ORAL 2 TIMES DAILY
COMMUNITY

## 2023-03-06 RX ORDER — BISACODYL 10 MG
10 SUPPOSITORY, RECTAL RECTAL DAILY PRN
COMMUNITY

## 2023-03-06 RX ORDER — M-VIT,TX,IRON,MINS/CALC/FOLIC 27MG-0.4MG
1 TABLET ORAL DAILY
COMMUNITY

## 2023-03-06 NOTE — PATIENT INSTRUCTIONS
Toe-touch weightbearing right lower extremity for transfers only. Continue PT at the facility. Eliquis for DVT prophylaxis. Follow-up in 4 weeks for reevaluation and x-ray. Call if any questions or concerns.     Future Appointments   Date Time Provider Brady Lopez   4/3/2023 12:00 PM SCHEDULE, [facility]

## 2023-03-06 NOTE — PROGRESS NOTES
Chief Complaint   Patient presents with    Follow-up     Patient presents from [facility] in wheelchair with facility escort. Patient remains NWB since surgery. OP:SURGEON: Dr. Roberto Oates MD  DATE OF PROCEDURE: 2-16-23  PROCEDURE: Open reduction internal fixation intertrochanteric region around the right total hip arthroplasty with plates and screws and cables     POD: 3 weeks    Subjective:  Caesar Long is following up from the above surgery. She is NWB on right lower extremity. Pain to extremity is none and is not taking prescribed pain medication. They denies numbness or tingling to the right lower extremity. Denies calf pain, chest pain, or shortness of breath. Patient continues to use DVT prophylaxis,  Eliquis . Patient is participating in therapy at the skilled nursing facility. Patient presents today with her caretaker from the facility. She does not answer questions on her own but does follow some commands for exam.     Review of Systems -  All pertinent positives/negative in HPI     Objective:    General: Alert and oriented X 3, normocephalic atraumatic, external ears and eye normal, sclera clear, no acute distress, respirations easy and unlabored with no audible wheezes, skin warm and dry, speech and dress appropriate for noted age, affect euthymic. Extremity:  Right Lower Extremity  Skin clean dry and intact, without signs of infection   Incision well-healed, staples removed and Steri-Strips applied  moderate edema noted to the hip area  Compartments supple throughout thigh and leg  Calf supple and not tender  negative Homans  Wiggles toes, minimal DF/PF  Patient presents in a recliner wheelchair  States sensation intact to touch in sural, deep peroneal, superficial peroneal, saphenous, posterior tibial  nerve distributions to foot/ankle. Palpable dorsalis pedis and posterior tibialis pulses, cap refill brisk in toes, foot warm/perfused.     Ht 5' 5\" (1.651 m)   BMI 22.13 kg/m²     XR:   Pelvis and right hip films demonstrate ORIF right periprosthetic hip fracture around the ANDRES with plate, screws and cables in stable position and alignment. No evidence of hardware loosening or failure. Assessment:   Diagnosis Orders   1. Closed fracture of right hip with routine healing, subsequent encounter        2. Chronic deep vein thrombosis (DVT) of proximal vein of left lower extremity ([address]          Plan:  Xrays reviewed with patient. Plan of care discussed in detail, all questions sought and answered to patients satisfaction at this time. Toe-touch weightbearing right lower extremity for transfers only. Continue PT at the facility. Eliquis for DVT prophylaxis. Follow-up in 4 weeks for reevaluation and x-ray. Call if any questions or concerns. Electronically signed by NICOLÁS Saez on 3/6/2023 at 10:38 AM  Note: This report was completed using Pivot voiced recognition software. Every effort has been made to ensure accuracy; however, inadvertent computerized transcription errors may be present.

## 2023-03-07 ENCOUNTER — TELEPHONE (OUTPATIENT)
Dept: ORTHOPEDIC SURGERY | Age: 88
End: 2023-03-07

## 2023-03-07 NOTE — TELEPHONE ENCOUNTER
Called and spoke with patient's daughter. Updated patient visit, reported that patient it now TTWB and should be able to start to be more active with PT. Advised that we will see patient back in 4 weeks for repeat xrays. Brisa verbalized understanding, no further questions at this time.        Future Appointments   Date Time Provider Brady Lopez   4/3/2023 12:00 PM SCHEDULE, [facility]

## 2023-03-07 NOTE — TELEPHONE ENCOUNTER
Patient's Daughter Lisandra Titus called to get an update from patient's appointment. She states that patient's facility was not able to provider her with information. Requests call back.      Future Appointments   Date Time Provider Brady Lopez   4/3/2023 12:00 PM SCHEDULE, [facility]

## 2023-03-31 DIAGNOSIS — S72.001D CLOSED FRACTURE OF RIGHT HIP WITH ROUTINE HEALING, SUBSEQUENT ENCOUNTER: Primary | ICD-10-CM

## 2023-04-03 ENCOUNTER — OFFICE VISIT (OUTPATIENT)
Dept: ORTHOPEDIC SURGERY | Age: 88
End: 2023-04-03
Payer: MEDICARE

## 2023-04-03 ENCOUNTER — HOSPITAL ENCOUNTER (OUTPATIENT)
Dept: GENERAL RADIOLOGY | Age: 88
Discharge: HOME | End: 2023-04-05
Payer: MEDICARE

## 2023-04-03 DIAGNOSIS — S72.001D CLOSED FRACTURE OF RIGHT HIP WITH ROUTINE HEALING, SUBSEQUENT ENCOUNTER: Primary | ICD-10-CM

## 2023-04-03 DIAGNOSIS — S72.001D CLOSED FRACTURE OF RIGHT HIP WITH ROUTINE HEALING, SUBSEQUENT ENCOUNTER: ICD-10-CM

## 2023-04-03 PROCEDURE — 99024 POSTOP FOLLOW-UP VISIT: CPT | Performed by: PHYSICIAN ASSISTANT

## 2023-04-03 PROCEDURE — 73502 X-RAY EXAM HIP UNI 2-3 VIEWS: CPT

## 2023-04-03 PROCEDURE — 99213 OFFICE O/P EST LOW 20 MIN: CPT

## 2023-04-03 RX ORDER — ACETAMINOPHEN 325 MG/1
650 TABLET ORAL EVERY 6 HOURS PRN
COMMUNITY

## 2023-04-03 RX ORDER — LEVOTHYROXINE SODIUM 88 UG/1
TABLET ORAL
COMMUNITY
Start: 2023-03-28

## 2023-04-03 RX ORDER — MORPHINE SULFATE 20 MG/ML
SOLUTION ORAL
COMMUNITY
Start: 2023-03-10

## 2023-04-03 RX ORDER — ACYCLOVIR 50 MG/G
CREAM TOPICAL
COMMUNITY
Start: 2023-03-20

## 2023-04-03 RX ORDER — CEPHALEXIN 250 MG/1
CAPSULE ORAL
COMMUNITY
Start: 2023-03-31

## 2023-04-03 RX ORDER — PROMETHAZINE HYDROCHLORIDE 25 MG/1
TABLET ORAL
COMMUNITY
Start: 2023-03-10

## 2023-04-03 RX ORDER — HYDROCODONE BITARTRATE AND ACETAMINOPHEN 5; 325 MG/1; MG/1
TABLET ORAL
COMMUNITY
Start: 2023-03-10

## 2023-04-03 NOTE — PROGRESS NOTES
Chief Complaint   Patient presents with    Post-Op Check     Right hip ORIF 2/16/2023. Patient residing at [address] [place] [address] Indra: Dr. Silviano Bone MD  DATE OF PROCEDURE: 2-16-23  PROCEDURE: Open reduction internal fixation intertrochanteric region around the right total hip arthroplasty with plates and screws and cables       Subjective:  Patricia Little is approximately 6 weeks follow-up from the above surgery. She is not accompanied by anybody today and has history of dementia and does not remember the surgery or that she even fractured her right femur. Presents with a Jose lift pad and does not remember recently transferring or bearing any weight. She was cleared to be toe-touch weightbearing at her last orthopedic appointment. Not sure if she is in physical therapy. According to SNF paperwork, she is taking Eliquis 2.5 mg twice daily. Denies any significant pain at the right hip or thigh or groin. Denies any paresthesias. Denies calf pain, CP, SOB, fever, chills, malaise. Review of Systems -  all pertinent positives and negatives in HPI. Objective:    General: Alert and oriented X 3, normocephalic atraumatic, external ears and eye normal, sclera clear, no acute distress, respirations easy and unlabored with no audible wheezes, skin warm and dry, speech and dress appropriate for noted age, affect euthymic. Extremity:  Right Lower Extremity  Skin clean dry and intact, without signs of infection  Nontender to palpation throughout the right hip  Minimal active range of motion of the right hip/knee due to deconditioning and weakness  Bilateral offloading foot boots intact  Compartments supple throughout thigh and leg  Calf supple and nontender  Demonstrates active ankle plantar/dorsiflexion/great toe extension. States sensation intact to touch in sural/deep peroneal/superficial peroneal/saphenous/posterior tibial nerve distributions to foot/ankle.    Palpable dorsalis

## 2023-04-03 NOTE — PATIENT INSTRUCTIONS
INSTRUCTIONS FOR FACILITY      Weight Bearing: okay to gradually advance WB to the R LE up to 50% over the next 1 month. Stop at 50% weight bearing until patient sees orthopedics again at next follow up. Use assistive devices when needed. Therapy: Continue PT/OT - recommend strengthening, ROM, advancing WB (See above). Pain control: per facility physician, RICE therapy prn    DVT Prophylaxis: Continue with Eliquis     Follow up: 6 weeks, see below. Future Appointments   Date Time Provider Brady Lopez   5/17/2023  9:45 AM Randy Navarro MD [facility]         Call with any questions or concerns.    [phone]

## 2023-05-17 ENCOUNTER — HOSPITAL ENCOUNTER (OUTPATIENT)
Dept: GENERAL RADIOLOGY | Age: 88
Discharge: HOME | End: 2023-05-19
Payer: MEDICARE

## 2023-05-17 ENCOUNTER — OFFICE VISIT (OUTPATIENT)
Dept: ORTHOPEDIC SURGERY | Age: 88
End: 2023-05-17
Payer: MEDICARE

## 2023-05-17 VITALS — WEIGHT: 130 LBS | BODY MASS INDEX: 21.66 KG/M2 | HEIGHT: 65 IN

## 2023-05-17 DIAGNOSIS — S72.001D CLOSED FRACTURE OF RIGHT HIP WITH ROUTINE HEALING, SUBSEQUENT ENCOUNTER: Primary | ICD-10-CM

## 2023-05-17 DIAGNOSIS — S72.001D CLOSED FRACTURE OF RIGHT HIP WITH ROUTINE HEALING, SUBSEQUENT ENCOUNTER: ICD-10-CM

## 2023-05-17 PROCEDURE — 99024 POSTOP FOLLOW-UP VISIT: CPT | Performed by: ORTHOPAEDIC SURGERY

## 2023-05-17 PROCEDURE — 73552 X-RAY EXAM OF FEMUR 2/>: CPT

## 2023-05-17 PROCEDURE — 99213 OFFICE O/P EST LOW 20 MIN: CPT

## 2023-05-17 NOTE — PATIENT INSTRUCTIONS
Patient seen today for right hip periprosthetic fracture    Incision well-healed with no signs of infection    Patient with no significant tenderness over the right hip    Patient no significant tenderness with movement of the right leg    Appears the patient does not walk much but may start weightbearing as tolerated for transfers if able    Patient can follow-up here on an as-needed basis, please do not hesitate to call with any questions, concerns, or need for reevaluation.

## 2023-05-17 NOTE — PROGRESS NOTES
above  Neurological ROS: no TIA or stroke symptoms       OBJECTIVE:   Alert and oriented X 3, no acute distress, respirations easy and unlabored with no audible wheezes, skin warm and dry, speech and dress appropriate for noted age, affect euthymic. Extremity:  Right lower extremity  Incision well-healed with no signs of infection  Compartment soft compressible  No tenderness palpation of the right hip  No pain with range of motion of the right hip  Calves are soft nontender  Wiggles toes  Palpable DP and PT pulse      XR: 5/17/23   X-ray of the right hip shows plate in appropriate position with no obvious breakage. There is significantly increased callus formation. The hip appears in a very similar position and if anything it subside very minimally since her x-rays in April. Otherwise it appears overall stable with good healing. Ht 5' 5\" (1.651 m)   Wt 130 lb (59 kg)   BMI 21.63 kg/m²     ASSESSMENT:     Diagnosis Orders   1. Closed fracture of right hip with routine healing, subsequent encounter            PLAN:  Incision well-healed with no signs of infection    Patient with no significant tenderness over the right hip    Patient no significant tenderness with movement of the right leg    Appears the patient does not walk much but may start weightbearing as tolerated for transfers if able    Patient can follow-up here on an as-needed basis, please do not hesitate to call with any questions, concerns, or need for reevaluation. ELECTRONICALLY signed by:    Sreekanth Cm MD  5/17/23    NOTE: This report was transcribed using voice recognition software.  Every effort was made to ensure accuracy; however, inadvertent computerized transcription errors may be present

## 2023-05-27 ENCOUNTER — APPOINTMENT (OUTPATIENT)
Dept: GENERAL RADIOLOGY | Age: 88
End: 2023-05-27
Payer: MEDICARE

## 2023-05-27 ENCOUNTER — APPOINTMENT (OUTPATIENT)
Dept: CT IMAGING | Age: 88
End: 2023-05-27
Payer: MEDICARE

## 2023-05-27 ENCOUNTER — HOSPITAL ENCOUNTER (EMERGENCY)
Age: 88
Discharge: HOME | End: 2023-05-28
Attending: EMERGENCY MEDICINE
Payer: MEDICARE

## 2023-05-27 VITALS
OXYGEN SATURATION: 94 % | SYSTOLIC BLOOD PRESSURE: 115 MMHG | HEART RATE: 68 BPM | RESPIRATION RATE: 16 BRPM | BODY MASS INDEX: 20.78 KG/M2 | DIASTOLIC BLOOD PRESSURE: 85 MMHG | TEMPERATURE: 98 F | WEIGHT: 124.9 LBS
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
## 2023-05-27 DIAGNOSIS — I60.9 SAH (SUBARACHNOID HEMORRHAGE) (HCC): ICD-10-CM

## 2023-05-27 PROCEDURE — 96374 THER/PROPH/DIAG INJ IV PUSH: CPT

## 2023-05-27 PROCEDURE — 71045 X-RAY EXAM CHEST 1 VIEW: CPT

## 2023-05-27 PROCEDURE — 99284 EMERGENCY DEPT VISIT MOD MDM: CPT

## 2023-05-27 PROCEDURE — 73521 X-RAY EXAM HIPS BI 2 VIEWS: CPT

## 2023-05-27 PROCEDURE — 72125 CT NECK SPINE W/O DYE: CPT

## 2023-05-27 PROCEDURE — 70450 CT HEAD/BRAIN W/O DYE: CPT

## 2023-05-27 ASSESSMENT — PAIN SCALES - GENERAL: PAINLEVEL_OUTOF10: 10

## 2023-05-27 ASSESSMENT — PAIN - FUNCTIONAL ASSESSMENT: PAIN_FUNCTIONAL_ASSESSMENT: 0-10

## 2023-05-28 LAB
ANION GAP SERPL CALCULATED.3IONS-SCNC: 11 MMOL/L (ref 7–16)
BASOPHILS # BLD: 0.02 E9/L (ref 0–0.2)
BASOPHILS NFR BLD: 0.2 % (ref 0–2)
BUN SERPL-MCNC: 13 MG/DL (ref 6–23)
CALCIUM SERPL-MCNC: 9.4 MG/DL (ref 8.6–10.2)
CHLORIDE SERPL-SCNC: 98 MMOL/L (ref 98–107)
CO2 SERPL-SCNC: 26 MMOL/L (ref 22–29)
CREAT SERPL-MCNC: 0.8 MG/DL (ref 0.5–1)
EOSINOPHIL # BLD: 0.06 E9/L (ref 0.05–0.5)
EOSINOPHIL NFR BLD: 0.6 % (ref 0–6)
ERYTHROCYTE [DISTWIDTH] IN BLOOD BY AUTOMATED COUNT: 12.9 FL (ref 11.5–15)
GLUCOSE SERPL-MCNC: 260 MG/DL (ref 74–99)
HCT VFR BLD AUTO: 36.7 % (ref 34–48)
HGB BLD-MCNC: 12.1 G/DL (ref 11.5–15.5)
IMM GRANULOCYTES # BLD: 0.05 E9/L
IMM GRANULOCYTES NFR BLD: 0.5 % (ref 0–5)
INR BLD: 1.8
LYMPHOCYTES # BLD: 1.08 E9/L (ref 1.5–4)
LYMPHOCYTES NFR BLD: 11.3 % (ref 20–42)
MCH RBC QN AUTO: 30.3 PG (ref 26–35)
MCHC RBC AUTO-ENTMCNC: 33 % (ref 32–34.5)
MCV RBC AUTO: 91.8 FL (ref 80–99.9)
MONOCYTES # BLD: 0.54 E9/L (ref 0.1–0.95)
MONOCYTES NFR BLD: 5.7 % (ref 2–12)
NEUTROPHILS # BLD: 7.77 E9/L (ref 1.8–7.3)
NEUTS SEG NFR BLD: 81.7 % (ref 43–80)
PLATELET # BLD AUTO: 428 E9/L (ref 130–450)
PMV BLD AUTO: 8.6 FL (ref 7–12)
POTASSIUM SERPL-SCNC: 3.8 MMOL/L (ref 3.5–5)
PROTHROMBIN TIME: 19.9 SEC (ref 9.3–12.4)
RBC # BLD AUTO: 4 E12/L (ref 3.5–5.5)
SODIUM SERPL-SCNC: 135 MMOL/L (ref 132–146)
WBC # BLD: 9.5 E9/L (ref 4.5–11.5)

## 2023-05-28 PROCEDURE — 80048 BASIC METABOLIC PNL TOTAL CA: CPT

## 2023-05-28 PROCEDURE — 36415 COLL VENOUS BLD VENIPUNCTURE: CPT

## 2023-05-28 PROCEDURE — 85025 COMPLETE CBC W/AUTO DIFF WBC: CPT

## 2023-05-28 PROCEDURE — 6360000002 HC RX W HCPCS: Performed by: EMERGENCY MEDICINE

## 2023-05-28 PROCEDURE — 85610 PROTHROMBIN TIME: CPT

## 2023-05-28 RX ORDER — LEVETIRACETAM 100 MG/ML
500 SOLUTION ORAL 2 TIMES DAILY
Qty: 70 ML | Refills: 0 | Status: SHIPPED | OUTPATIENT
Start: 2023-05-28 | End: 2023-06-04

## 2023-05-28 RX ORDER — LEVETIRACETAM 500 MG/1
500 TABLET ORAL 2 TIMES DAILY
Qty: 14 TABLET | Refills: 0 | Status: SHIPPED | OUTPATIENT
Start: 2023-05-28 | End: 2023-05-28

## 2023-05-28 RX ORDER — LEVETIRACETAM 500 MG/1
500 TABLET ORAL ONCE
Status: DISCONTINUED | OUTPATIENT
Start: 2023-05-28 | End: 2023-05-28

## 2023-05-28 RX ORDER — LEVETIRACETAM 500 MG/5ML
500 INJECTION, SOLUTION, CONCENTRATE INTRAVENOUS ONCE
Status: COMPLETED | OUTPATIENT
Start: 2023-05-28 | End: 2023-05-28

## 2023-05-28 RX ORDER — LEVETIRACETAM 500 MG/5ML
500 INJECTION, SOLUTION, CONCENTRATE INTRAVENOUS ONCE
Status: DISCONTINUED | OUTPATIENT
Start: 2023-05-28 | End: 2023-05-28

## 2023-05-28 RX ADMIN — LEVETIRACETAM 500 MG: 100 INJECTION, SOLUTION INTRAVENOUS at 02:44

## (undated) DEVICE — GUIDEWIRE ORTH DIA2.5MM LOK SMOOTH DRL TIP FLX THRD FOR

## (undated) DEVICE — 2.8MM DRILL BIT QC 200MM 110MM CALIBRATION

## (undated) DEVICE — GLOVE ORANGE PI 8   MSG9080

## (undated) DEVICE — BIT DRL L180MM DIA4.3MM QUIK CPL W/O STP REUSE

## (undated) DEVICE — 2.5MM DRILL BIT QC 240MM STER 150MM CALIBRATION

## (undated) DEVICE — FRACTURE TABLE: Brand: MEDLINE INDUSTRIES, INC.

## (undated) DEVICE — STRYKER PERFORMANCE SERIES SAGITTAL BLADE: Brand: STRYKER PERFORMANCE SERIES

## (undated) DEVICE — SOLUTION IRRIG 3000ML 0.9% SOD CHL USP UROMATIC PLAS CONT

## (undated) DEVICE — PILLOW POS W15XH6XL22IN RASPBERRY FOAM ABD W/ STRP DISP FOR

## (undated) DEVICE — PEEL-AWAY HOOD: Brand: FLYTE, SURGICOOL

## (undated) DEVICE — SOLUTION IV IRRIG POUR BRL 0.9% SODIUM CHL 2F7124

## (undated) DEVICE — Device

## (undated) DEVICE — SYRINGE 20ML LL S/C 50

## (undated) DEVICE — 3M™ IOBAN™ 2 ANTIMICROBIAL INCISE DRAPE 6651EZ: Brand: IOBAN™ 2

## (undated) DEVICE — GOWN,AURORA,BRTHSLV,2XL,18/CS: Brand: MEDLINE

## (undated) DEVICE — TUBING SUCT 12FR MAL ALUM SHFT FN CAP VENT UNIV CONN W/ OBT

## (undated) DEVICE — DRESSING HYDROFIBER AQUACEL AG ADVANTAGE 3.5X14 IN

## (undated) DEVICE — SOLUTION IV IRRIG WATER 1000ML POUR BRL 2F7114

## (undated) DEVICE — BIT DRL L5IN DIA2.8MM STD ST S STL TWST BUSA

## (undated) DEVICE — GLOVE ORTHO 8   MSG9480

## (undated) DEVICE — GOWN,BREATHABLE SLV,AURORA,XLG,STRL: Brand: MEDLINE

## (undated) DEVICE — COVER BOOT L REG BLU SMS POLYPR KNEE HI E FLD RESIST

## (undated) DEVICE — BIT DRL 230/200MM CALIB DIA3.2MM 3 FLUT QUIK CPL

## (undated) DEVICE — HEWSON SUTURE RETRIEVER: Brand: HEWSON SUTURE RETRIEVER

## (undated) DEVICE — GLOVE SURG SZ 8 L12IN FNGR THK79MIL GRN LTX FREE